# Patient Record
Sex: FEMALE | Race: WHITE | NOT HISPANIC OR LATINO | Employment: OTHER | URBAN - METROPOLITAN AREA
[De-identification: names, ages, dates, MRNs, and addresses within clinical notes are randomized per-mention and may not be internally consistent; named-entity substitution may affect disease eponyms.]

---

## 2020-03-13 RX ORDER — SIMVASTATIN 20 MG
TABLET ORAL
COMMUNITY
End: 2020-03-16

## 2020-03-16 ENCOUNTER — OFFICE VISIT (OUTPATIENT)
Dept: SURGERY | Facility: HOSPITAL | Age: 75
End: 2020-03-16
Payer: COMMERCIAL

## 2020-03-16 VITALS
SYSTOLIC BLOOD PRESSURE: 191 MMHG | WEIGHT: 147.8 LBS | DIASTOLIC BLOOD PRESSURE: 82 MMHG | TEMPERATURE: 98.9 F | HEIGHT: 60 IN | BODY MASS INDEX: 29.02 KG/M2 | RESPIRATION RATE: 16 BRPM | HEART RATE: 51 BPM

## 2020-03-16 DIAGNOSIS — D18.01 HEMANGIOMA OF SKIN: ICD-10-CM

## 2020-03-16 DIAGNOSIS — K40.90 RIGHT INGUINAL HERNIA: Primary | ICD-10-CM

## 2020-03-16 DIAGNOSIS — L98.9 SKIN LESION: ICD-10-CM

## 2020-03-16 DIAGNOSIS — L21.9 SEBORRHEA: ICD-10-CM

## 2020-03-16 DIAGNOSIS — D22.9 MULTIPLE PIGMENTED NEVI: ICD-10-CM

## 2020-03-16 PROCEDURE — 99204 OFFICE O/P NEW MOD 45 MIN: CPT | Performed by: SURGERY

## 2020-03-16 RX ORDER — ZOLPIDEM TARTRATE 10 MG/1
TABLET ORAL AS NEEDED
COMMUNITY
Start: 2020-03-02

## 2020-03-16 RX ORDER — SIMVASTATIN 20 MG
20 TABLET ORAL
COMMUNITY
Start: 2020-03-16

## 2020-03-16 RX ORDER — AMLODIPINE BESYLATE 5 MG/1
5 TABLET ORAL DAILY
COMMUNITY
Start: 2020-03-16

## 2020-03-16 RX ORDER — ATENOLOL AND CHLORTHALIDONE TABLET 100; 25 MG/1; MG/1
1 TABLET ORAL DAILY
COMMUNITY
Start: 2020-03-16

## 2020-03-16 NOTE — PROGRESS NOTES
Assessment/Plan:  Junior Joann Washington is a 69year old female who presents today per referral by ANASTASIA Brooks regarding possible right inguinal hernia  Physical exam revealed right inguinal hernia  Explained the etiology of these hernias  Discussed the risks,benefits and alternatives to laparoscopic repair of right inguinal hernia versus open repair of right inguinal hernia  Explained the surgery as well as pre- and post-procedural protocols and restrictions  Lifting restrictions of no greater than 15 pounds and no strenuous activity for 2 weeks after surgery  No heavy lifting of greater than 25 pounds for weeks 3 and 4  Patient understands the procedure  She would like to proceed with laparoscopic repair  She would not like to schedule the procedure at this time  She is going to call the office when she is ready to schedule the procedure  Patient knows to contact the office if she has any questions or concerns  Skin- There is a small slightly ulcerated erythemas lesion of the left fifth finger  Patient was instructed to cover the area with gauze and dressing  Multiple pigmented nevi, seborrheas, hemangiomas of the back and face  Explained to the patient that these skin lesions are benign at this time  Her PCP should monitor them annually  No problem-specific Assessment & Plan notes found for this encounter  There are no diagnoses linked to this encounter  Subjective:      Patient ID: Robert Ocampo is a 69 y o  female  Junior Joann Washington is a 69year old female who presents today per referral by ANASTASIA Brooks regarding possible right inguinal hernia  Patient states that she first noticed lump of her right groin when she was moving heavy objects back in February  She can feel the lump more when she hs standing up versus laying down  Patient reports that the area feels achy  Her bowel movements are normal  She says that she is up to date with her colonoscopy     Patient will talk to her daughter who lives in [place] and discuss with her if she should have the surgery now or wait for a few months  Patient has surgical history of cholecystectomy  Patient also stated that she has a wound on her left pinkie  The following portions of the patient's history were reviewed and updated as appropriate: allergies, current medications, past family history, past medical history, past social history, past surgical history and problem list     Review of Systems   Constitutional: Negative  HENT: Negative  Eyes: Negative  Respiratory: Negative  Cardiovascular: Negative  Gastrointestinal:        Lump of the right groin    Endocrine: Negative  Genitourinary: Negative  Musculoskeletal: Negative  Skin: Positive for wound (Left fifit finger )  Allergic/Immunologic: Negative  Neurological: Negative  Hematological: Negative  Psychiatric/Behavioral: Negative  Objective: There were no vitals taken for this visit  Physical Exam   Constitutional: She is oriented to person, place, and time  She appears well-developed and well-nourished  No distress  HENT:   Head: Normocephalic and atraumatic  Right Ear: External ear normal    Left Ear: External ear normal    Nose: Nose normal    Mouth/Throat: Oropharynx is clear and moist    Eyes: Pupils are equal, round, and reactive to light  Conjunctivae and EOM are normal    Neck: Normal range of motion  Neck supple  No JVD present  No tracheal deviation present  No thyromegaly present  Cardiovascular: Normal rate, regular rhythm, normal heart sounds and intact distal pulses  Exam reveals no gallop and no friction rub  No murmur heard  Pulmonary/Chest: Effort normal and breath sounds normal  No stridor  No respiratory distress  She has no wheezes  She has no rales  She exhibits no tenderness  Abdominal: Soft  Bowel sounds are normal  She exhibits no distension and no mass  There is no tenderness   There is no rebound and no guarding  A hernia (Right inguinal ) is present  Musculoskeletal: Normal range of motion  She exhibits no edema, tenderness or deformity  Lymphadenopathy:     She has no cervical adenopathy  Neurological: She is alert and oriented to person, place, and time  No cranial nerve deficit  Coordination normal    Skin: Skin is warm and dry  No rash noted  She is not diaphoretic  No erythema  No pallor  Small slightly ulcerated erythemas lesion of the left fifth finger  Psychiatric: She has a normal mood and affect  Her behavior is normal  Judgment and thought content normal    Nursing note and vitals reviewed  By signing my name below, Roberth Santoyo, attest that this documentation has been prepared under the direction and in the presence of Nathalie Menjivar MD  Electronically Signed: Kristen Hawkins  3/16/20  Margie Sharma, personally performed the services described in this documentation  All medical record entries made by the scribe were at my direction and in my presence  I have reviewed the chart and discharge instructions and agree that the record reflects my personal performance and is accurate and complete   Nathalie Menjivar MD  3/16/20

## 2020-03-18 ENCOUNTER — NURSE TRIAGE (OUTPATIENT)
Dept: OTHER | Facility: OTHER | Age: 75
End: 2020-03-18

## 2020-03-18 ENCOUNTER — HOSPITAL ENCOUNTER (EMERGENCY)
Facility: HOSPITAL | Age: 75
Discharge: HOME | End: 2020-03-18
Attending: EMERGENCY MEDICINE
Payer: COMMERCIAL

## 2020-03-18 VITALS
OXYGEN SATURATION: 97 % | RESPIRATION RATE: 16 BRPM | TEMPERATURE: 97.8 F | DIASTOLIC BLOOD PRESSURE: 61 MMHG | HEIGHT: 60 IN | BODY MASS INDEX: 29.06 KG/M2 | HEART RATE: 55 BPM | SYSTOLIC BLOOD PRESSURE: 149 MMHG | WEIGHT: 148 LBS

## 2020-03-18 DIAGNOSIS — N39.0 UTI (URINARY TRACT INFECTION): Primary | ICD-10-CM

## 2020-03-18 LAB
BACTERIA UR QL AUTO: ABNORMAL /HPF
BILIRUB UR QL STRIP: NEGATIVE
CLARITY UR: CLEAR
COLOR UR: YELLOW
GLUCOSE UR STRIP-MCNC: NEGATIVE MG/DL
HGB UR QL STRIP.AUTO: NEGATIVE
KETONES UR STRIP-MCNC: NEGATIVE MG/DL
LEUKOCYTE ESTERASE UR QL STRIP: ABNORMAL
NITRITE UR QL STRIP: NEGATIVE
NON-SQ EPI CELLS URNS QL MICRO: ABNORMAL /HPF
PH UR STRIP.AUTO: 6 [PH]
PROT UR STRIP-MCNC: NEGATIVE MG/DL
RBC #/AREA URNS AUTO: ABNORMAL /HPF
SP GR UR STRIP.AUTO: <=1.005 (ref 1–1.03)
UROBILINOGEN UR QL STRIP.AUTO: 0.2 E.U./DL
WBC #/AREA URNS AUTO: ABNORMAL /HPF

## 2020-03-18 PROCEDURE — 81001 URINALYSIS AUTO W/SCOPE: CPT | Performed by: EMERGENCY MEDICINE

## 2020-03-18 PROCEDURE — 99282 EMERGENCY DEPT VISIT SF MDM: CPT | Performed by: EMERGENCY MEDICINE

## 2020-03-18 PROCEDURE — 99283 EMERGENCY DEPT VISIT LOW MDM: CPT

## 2020-03-18 RX ORDER — NITROFURANTOIN 25; 75 MG/1; MG/1
100 CAPSULE ORAL 2 TIMES DAILY
COMMUNITY
End: 2020-06-15

## 2020-03-18 NOTE — ED PROVIDER NOTES
History  Chief Complaint   Patient presents with   Mario Augustin     pt states she felt shaky at home, started antibiotics for UTI      70 yo F with PMH of HLD, HTN presents with feeling "shaky " was started on abx by PCP for likely UTI (took a home test)  PCP initially ordered bactrim, when she took it she felt like her cheeks and ears were red, so PCP changed to macrobid  She hasn't taken it yet because she lives alone and she was afraid to have another "reaction" so came in for eval  No fevers/chills  No abd pain, just feels like a full bladder  Has urgency and frequency  Nausea, mild, fleeting  Still tolerating PO  No URI sx or travel  No trouble breathing or throat swelling  History provided by:  Patient and medical records   used: No    Female  Problem   Severity:  Mild  Onset quality:  Gradual  Duration:  1 day  Timing:  Constant  Progression:  Unchanged  Chronicity:  New  Associated symptoms: no abdominal pain, no chest pain, no congestion, no cough, no diarrhea, no ear pain, no fatigue, no fever, no headaches, no loss of consciousness, no myalgias, no nausea, no rash, no rhinorrhea, no shortness of breath, no sore throat, no vomiting and no wheezing        Prior to Admission Medications   Prescriptions Last Dose Informant Patient Reported? Taking?    amLODIPine (NORVASC) 5 mg tablet   Yes No   atenolol-chlorthalidone (TENORETIC) 100-25 mg per tablet   Yes No   nitrofurantoin (MACROBID) 100 mg capsule  Self Yes Yes   Sig: Take 100 mg by mouth 2 (two) times a day   simvastatin (ZOCOR) 20 mg tablet   Yes No   zolpidem (AMBIEN) 10 mg tablet   Yes No   Sig: Take 1/2 tablet BY MOUTH ONCE A DAY AT BEDTIME      Facility-Administered Medications: None       Past Medical History:   Diagnosis Date    Allergic rhinitis     DDD (degenerative disc disease), lumbar     Diverticulitis large intestine w/o perforation or abscess w/o bleeding     Hemorrhoids     Hernia of abdominal cavity 2020    History of gallstones     Hyperlipidemia     Hypertension     Lyme disease        Past Surgical History:   Procedure Laterality Date    CHOLECYSTECTOMY      FINGER SURGERY      RIGHT SECOND FINGER-"TRIGGER FINGER"    PILONIDAL CYST EXCISION      SHOULDER SURGERY Right        Family History   Problem Relation Age of Onset    Coronary artery disease Mother     Hypertension Mother     Hypotension Father     Emphysema Father     Prostate cancer Father      I have reviewed and agree with the history as documented  E-Cigarette/Vaping    E-Cigarette Use Never User      E-Cigarette/Vaping Substances     Social History     Tobacco Use    Smoking status: Former Smoker     Last attempt to quit: 1970     Years since quittin 2    Smokeless tobacco: Never Used   Substance Use Topics    Alcohol use: Not Currently    Drug use: Not Currently       Review of Systems   Constitutional: Negative for appetite change, chills, fatigue and fever  HENT: Negative for congestion, ear pain, rhinorrhea, sore throat, trouble swallowing and voice change  Eyes: Negative for pain and visual disturbance  Respiratory: Negative for cough, chest tightness, shortness of breath and wheezing  Cardiovascular: Negative for chest pain, palpitations and leg swelling  Gastrointestinal: Negative for abdominal pain, blood in stool, constipation, diarrhea, nausea and vomiting  Genitourinary: Positive for frequency and urgency  Negative for decreased urine volume, difficulty urinating, dysuria, flank pain, hematuria, vaginal bleeding, vaginal discharge and vaginal pain  Musculoskeletal: Negative for back pain, myalgias, neck pain and neck stiffness  Skin: Negative for rash  Neurological: Negative for dizziness, loss of consciousness, syncope, speech difficulty, light-headedness and headaches  Psychiatric/Behavioral: Negative for confusion and suicidal ideas         Physical Exam  Physical Exam Constitutional: She is oriented to person, place, and time  She appears well-developed and well-nourished  No distress  HENT:   Head: Normocephalic and atraumatic  Right Ear: External ear normal    Left Ear: External ear normal    Nose: Nose normal    Mouth/Throat: Oropharynx is clear and moist    Eyes: Pupils are equal, round, and reactive to light  Conjunctivae and EOM are normal  Right eye exhibits no discharge  Left eye exhibits no discharge  No scleral icterus  Neck: Normal range of motion  Neck supple  No tracheal deviation present  Cardiovascular: Normal rate, regular rhythm, normal heart sounds and intact distal pulses  Exam reveals no gallop and no friction rub  No murmur heard  Pulmonary/Chest: Effort normal and breath sounds normal  No stridor  No respiratory distress  She exhibits no tenderness  Abdominal: Soft  Bowel sounds are normal  There is no tenderness  There is no rebound and no guarding  Musculoskeletal: Normal range of motion  She exhibits no edema or deformity  Lymphadenopathy:     She has no cervical adenopathy  Neurological: She is alert and oriented to person, place, and time  No cranial nerve deficit or sensory deficit  Coordination normal    Skin: Skin is warm and dry  No rash noted  She is not diaphoretic  Psychiatric: She has a normal mood and affect  Her behavior is normal    Nursing note and vitals reviewed        Vital Signs  ED Triage Vitals [03/18/20 0040]   Temperature Pulse Respirations Blood Pressure SpO2   97 8 °F (36 6 °C) 60 18 (!) 176/77 98 %      Temp src Heart Rate Source Patient Position - Orthostatic VS BP Location FiO2 (%)   -- -- -- -- --      Pain Score       4           Vitals:    03/18/20 0040 03/18/20 0130   BP: (!) 176/77 149/61   Pulse: 60 55         Visual Acuity      ED Medications  Medications - No data to display    Diagnostic Studies  Results Reviewed     Procedure Component Value Units Date/Time    Urine Microscopic [[id number]] (Abnormal) Collected:  03/18/20 0142    Lab Status:  Final result Specimen:  Urine, Clean Catch Updated:  03/18/20 0156     RBC, UA None Seen /hpf      WBC, UA 0-1 /hpf      Epithelial Cells Occasional /hpf      Bacteria, UA None Seen /hpf     UA w Reflex to Microscopic w Reflex to Culture [[id number]]  (Abnormal) Collected:  03/18/20 0142    Lab Status:  Final result Specimen:  Urine, Clean Catch Updated:  03/18/20 0149     Color, UA Yellow     Clarity, UA Clear     Specific Gravity, UA <=1 005     pH, UA 6 0     Leukocytes, UA Trace     Nitrite, UA Negative     Protein, UA Negative mg/dl      Glucose, UA Negative mg/dl      Ketones, UA Negative mg/dl      Urobilinogen, UA 0 2 E U /dl      Bilirubin, UA Negative     Blood, UA Negative                 No orders to display              Procedures  Procedures         ED Course  ED Course as of Mar 18 0207   Wed Mar 18, 2020   0152 Pt very well appearing  No sign of allergic rxn  Nontoxic  No CVAT or abd tenderness  Will check urine here  She took 1st dose macrobid here  Will monitor and d/c home  RTED instructions given                                      MDM  Number of Diagnoses or Management Options  UTI (urinary tract infection): new and requires workup     Amount and/or Complexity of Data Reviewed  Clinical lab tests: ordered and reviewed  Review and summarize past medical records: yes    Risk of Complications, Morbidity, and/or Mortality  Presenting problems: low  Diagnostic procedures: minimal  Management options: low    Patient Progress  Patient progress: improved        Disposition  Final diagnoses:   UTI (urinary tract infection)     Time reflects when diagnosis was documented in both MDM as applicable and the Disposition within this note     Time User Action Codes Description Comment    3/18/2020  2:06 AM Marlene Newell Add [N39 0] UTI (urinary tract infection)       ED Disposition     ED Disposition Condition Date/Time Comment    Discharge Stable Wed Mar 18, 2020  1:56 AM Lauren Desai discharge to home/self care  Follow-up Information     Follow up With Specialties Details Why Contact Info Additional Information    JOCELYNE Morris Nurse Practitioner Schedule an appointment as soon as possible for a visit   [address]  ([address])  Bony JEAN [address] [facility] Emergency Medicine  If symptoms worsen [address] [place], [address]  [phone]  ED, [address], [address]          Patient's Medications   Discharge Prescriptions    No medications on file     No discharge procedures on file      PDMP Review     None          ED Provider  Electronically Signed by           Taylor Leung MD  03/18/20 8958

## 2020-03-18 NOTE — TELEPHONE ENCOUNTER
Regarding: UTI Medication Reaction  ----- Message from John Persaud sent at 3/17/2020 11:49 PM EDT -----  " I have a low grade fever of 97 7" I was told I have a UTI  I believe I was having a reaction to medicine because I had red cheeks and red ears (sulsamethoxazole)  I called my PCP and they ordered me a new medicine (nitrofurantoin 100 mg twice a day) but afraid to take it  I want my general surgeon called in reference to taking the new medicine prescribed by my PCP

## 2020-06-15 ENCOUNTER — OFFICE VISIT (OUTPATIENT)
Dept: SURGERY | Facility: HOSPITAL | Age: 75
End: 2020-06-15
Payer: COMMERCIAL

## 2020-06-15 VITALS
SYSTOLIC BLOOD PRESSURE: 171 MMHG | TEMPERATURE: 97.9 F | BODY MASS INDEX: 30.12 KG/M2 | DIASTOLIC BLOOD PRESSURE: 78 MMHG | WEIGHT: 153.4 LBS | HEART RATE: 49 BPM | HEIGHT: 60 IN

## 2020-06-15 DIAGNOSIS — K40.90 NON-RECURRENT UNILATERAL INGUINAL HERNIA WITHOUT OBSTRUCTION OR GANGRENE: Primary | ICD-10-CM

## 2020-06-15 PROCEDURE — 99213 OFFICE O/P EST LOW 20 MIN: CPT | Performed by: SURGERY

## 2020-06-15 RX ORDER — PREDNISONE 10 MG/1
TABLET ORAL EVERY 24 HOURS
COMMUNITY
Start: 2020-06-11 | End: 2020-07-09 | Stop reason: ALTCHOICE

## 2020-06-15 RX ORDER — ZOLPIDEM TARTRATE 10 MG/1
TABLET ORAL
COMMUNITY
End: 2020-07-09 | Stop reason: SDUPTHER

## 2020-06-15 RX ORDER — GABAPENTIN 300 MG/1
CAPSULE ORAL 2 TIMES DAILY
COMMUNITY

## 2020-06-15 RX ORDER — AMOXICILLIN AND CLAVULANATE POTASSIUM 875; 125 MG/1; MG/1
TABLET, FILM COATED ORAL EVERY 12 HOURS
COMMUNITY
End: 2020-07-09 | Stop reason: ALTCHOICE

## 2020-06-16 ENCOUNTER — APPOINTMENT (OUTPATIENT)
Dept: LAB | Facility: HOSPITAL | Age: 75
End: 2020-06-16
Attending: SURGERY
Payer: COMMERCIAL

## 2020-06-16 ENCOUNTER — HOSPITAL ENCOUNTER (OUTPATIENT)
Dept: RADIOLOGY | Facility: HOSPITAL | Age: 75
Discharge: HOME | End: 2020-06-16
Attending: SURGERY
Payer: COMMERCIAL

## 2020-06-16 ENCOUNTER — OFFICE VISIT (OUTPATIENT)
Dept: LAB | Facility: HOSPITAL | Age: 75
End: 2020-06-16
Attending: SURGERY
Payer: COMMERCIAL

## 2020-06-16 DIAGNOSIS — K40.90 NON-RECURRENT UNILATERAL INGUINAL HERNIA WITHOUT OBSTRUCTION OR GANGRENE: ICD-10-CM

## 2020-06-16 LAB
ANION GAP SERPL CALCULATED.3IONS-SCNC: 5 MMOL/L (ref 4–13)
ATRIAL RATE: 44 BPM
BASOPHILS # BLD AUTO: 0.03 THOUSANDS/ΜL (ref 0–0.1)
BASOPHILS NFR BLD AUTO: 0 % (ref 0–1)
BILIRUB UR QL STRIP: NEGATIVE
BUN SERPL-MCNC: 20 MG/DL (ref 5–25)
CALCIUM SERPL-MCNC: 9.6 MG/DL (ref 8.3–10.1)
CHLORIDE SERPL-SCNC: 97 MMOL/L (ref 100–108)
CLARITY UR: CLEAR
CO2 SERPL-SCNC: 32 MMOL/L (ref 21–32)
COLOR UR: YELLOW
CREAT SERPL-MCNC: 0.85 MG/DL (ref 0.6–1.3)
EOSINOPHIL # BLD AUTO: 0.13 THOUSAND/ΜL (ref 0–0.61)
EOSINOPHIL NFR BLD AUTO: 1 % (ref 0–6)
ERYTHROCYTE [DISTWIDTH] IN BLOOD BY AUTOMATED COUNT: 12.8 % (ref 11.6–15.1)
EST. AVERAGE GLUCOSE BLD GHB EST-MCNC: 120 MG/DL
GFR SERPL CREATININE-BSD FRML MDRD: 69 ML/MIN/1.73SQ M
GLUCOSE P FAST SERPL-MCNC: 97 MG/DL (ref 65–99)
GLUCOSE UR STRIP-MCNC: NEGATIVE MG/DL
HBA1C MFR BLD: 5.8 %
HCT VFR BLD AUTO: 42.2 % (ref 34.8–46.1)
HGB BLD-MCNC: 14 G/DL (ref 11.5–15.4)
HGB UR QL STRIP.AUTO: NEGATIVE
IMM GRANULOCYTES # BLD AUTO: 0.05 THOUSAND/UL (ref 0–0.2)
IMM GRANULOCYTES NFR BLD AUTO: 0 % (ref 0–2)
KETONES UR STRIP-MCNC: NEGATIVE MG/DL
LEUKOCYTE ESTERASE UR QL STRIP: NEGATIVE
LYMPHOCYTES # BLD AUTO: 3.17 THOUSANDS/ΜL (ref 0.6–4.47)
LYMPHOCYTES NFR BLD AUTO: 27 % (ref 14–44)
MCH RBC QN AUTO: 29.9 PG (ref 26.8–34.3)
MCHC RBC AUTO-ENTMCNC: 33.2 G/DL (ref 31.4–37.4)
MCV RBC AUTO: 90 FL (ref 82–98)
MONOCYTES # BLD AUTO: 0.81 THOUSAND/ΜL (ref 0.17–1.22)
MONOCYTES NFR BLD AUTO: 7 % (ref 4–12)
NEUTROPHILS # BLD AUTO: 7.41 THOUSANDS/ΜL (ref 1.85–7.62)
NEUTS SEG NFR BLD AUTO: 65 % (ref 43–75)
NITRITE UR QL STRIP: NEGATIVE
NRBC BLD AUTO-RTO: 0 /100 WBCS
P AXIS: 40 DEGREES
PH UR STRIP.AUTO: 8 [PH]
PLATELET # BLD AUTO: 278 THOUSANDS/UL (ref 149–390)
PMV BLD AUTO: 10.5 FL (ref 8.9–12.7)
POTASSIUM SERPL-SCNC: 3.6 MMOL/L (ref 3.5–5.3)
PR INTERVAL: 152 MS
PROT UR STRIP-MCNC: NEGATIVE MG/DL
QRS AXIS: 38 DEGREES
QRSD INTERVAL: 78 MS
QT INTERVAL: 508 MS
QTC INTERVAL: 434 MS
RBC # BLD AUTO: 4.69 MILLION/UL (ref 3.81–5.12)
SODIUM SERPL-SCNC: 134 MMOL/L (ref 136–145)
SP GR UR STRIP.AUTO: 1.01 (ref 1–1.03)
T WAVE AXIS: 45 DEGREES
UROBILINOGEN UR QL STRIP.AUTO: 0.2 E.U./DL
VENTRICULAR RATE: 44 BPM
WBC # BLD AUTO: 11.6 THOUSAND/UL (ref 4.31–10.16)

## 2020-06-16 PROCEDURE — 83036 HEMOGLOBIN GLYCOSYLATED A1C: CPT

## 2020-06-16 PROCEDURE — 36415 COLL VENOUS BLD VENIPUNCTURE: CPT

## 2020-06-16 PROCEDURE — 71046 X-RAY EXAM CHEST 2 VIEWS: CPT

## 2020-06-16 PROCEDURE — 85025 COMPLETE CBC W/AUTO DIFF WBC: CPT

## 2020-06-16 PROCEDURE — 93005 ELECTROCARDIOGRAM TRACING: CPT

## 2020-06-16 PROCEDURE — 80048 BASIC METABOLIC PNL TOTAL CA: CPT

## 2020-06-16 PROCEDURE — 81003 URINALYSIS AUTO W/O SCOPE: CPT | Performed by: SURGERY

## 2020-06-16 PROCEDURE — 93010 ELECTROCARDIOGRAM REPORT: CPT | Performed by: INTERNAL MEDICINE

## 2020-06-18 DIAGNOSIS — K40.90 NON-RECURRENT UNILATERAL INGUINAL HERNIA WITHOUT OBSTRUCTION OR GANGRENE: Primary | ICD-10-CM

## 2020-07-07 DIAGNOSIS — K40.90 NON-RECURRENT UNILATERAL INGUINAL HERNIA WITHOUT OBSTRUCTION OR GANGRENE: ICD-10-CM

## 2020-07-07 PROCEDURE — U0003 INFECTIOUS AGENT DETECTION BY NUCLEIC ACID (DNA OR RNA); SEVERE ACUTE RESPIRATORY SYNDROME CORONAVIRUS 2 (SARS-COV-2) (CORONAVIRUS DISEASE [COVID-19]), AMPLIFIED PROBE TECHNIQUE, MAKING USE OF HIGH THROUGHPUT TECHNOLOGIES AS DESCRIBED BY CMS-2020-01-R: HCPCS

## 2020-07-09 LAB
INPATIENT: NORMAL
SARS-COV-2 RNA SPEC QL NAA+PROBE: NOT DETECTED

## 2020-07-09 RX ORDER — OMEPRAZOLE 20 MG/1
20 TABLET, DELAYED RELEASE ORAL DAILY PRN
COMMUNITY

## 2020-07-09 RX ORDER — ACETAMINOPHEN 500 MG
500 TABLET ORAL EVERY 6 HOURS PRN
COMMUNITY

## 2020-07-09 NOTE — PRE-PROCEDURE INSTRUCTIONS
Pre-Surgery Instructions:   Medication Instructions    acetaminophen (TYLENOL) 500 mg tablet Instructed patient per Anesthesia Guidelines   amLODIPine (NORVASC) 5 mg tablet Instructed patient per Anesthesia Guidelines   atenolol-chlorthalidone (TENORETIC) 100-25 mg per tablet Instructed patient per Anesthesia Guidelines   gabapentin (NEURONTIN) 300 mg capsule Instructed patient per Anesthesia Guidelines   omeprazole (PriLOSEC OTC) 20 MG tablet Instructed patient per Anesthesia Guidelines   simvastatin (ZOCOR) 20 mg tablet Instructed patient per Anesthesia Guidelines   zolpidem (AMBIEN) 10 mg tablet Instructed patient per Anesthesia Guidelines  Before your operation, you play an important role in decreasing your risk for infection by washing with special antiseptic soap  This is an effective way to reduce bacteria on the skin which may help to prevent infections at the surgical site  Please read the following directions in advance  1  In the week before your operation purchase a 4 ounce bottle of antiseptic soap containing chlorhexidine gluconate 4%  Some brand names include: Aplicare, Endure, and Hibiclens  The cost is usually less than $5 00  · For your convenience, the [employer] carries the soap  · It may also be available at your doctor's office or pre-admission testing center, and at most retail pharmacies  · If you are allergic or sensitive to soaps containing chlorhexidine gluconate (CHG), please let your doctor know so another antiseptic soap can be suggested  · CHG antiseptic soap is for external use only  2      The day before your operation follow these directions carefully to get ready  · Place clean lines (sheets) on your bed; you should sleep on clean sheets after your evening shower  · Get clean towels and washcloths ready - you need enough for 2 showers  · Set aside clean underwear, pajamas, and clothing to wear after the shower      Reminders:  · DO NOT use any other soap or body rinse on your skin during or after the antiseptic showers  · DO NOT use lotion , powder, deodorant, or perfume/aftershave of any kind on your skin after your antiseptic shower  · DO NOT shave any body parts in the 24 hours/the day before your operation  · DO NOT get the antiseptic soap in your eyes, ears, nose, mouth, or vaginal area  3      You will need to shower the night before AND the morning of your Surgery  Shower 1:  · The evening before your operation, take the fist shower  · First, shampoo your hair with regular shampoo and rinse it completely before you use the anitseptic soap  After washing and rinsing your hair, rinse your body  · Next, use a clean wash cloth to apply the antiseptic soap and wash your body from the neck down to your toes using 1/2 bottle of the antiseptic soap  You will use the other 1/2 bottle for the second shower  · Clean the area where your incision will be; later this area well for about 2 minutes  · If you ar having head or neck surgery, wash areas with the antiseptic soap  · Rinse yourself completely with running water  · Use a clean towel to dry off  · Wear clean underwear and clothing/pajamas  Shower 2:  · The Morning of your operation, take the second shower following the same steps as Shower 1 using the second 1/2 of the bottle of antiseptic soap  · Use clean cloths and towels to was and dry yourself off  · Wear clean underwear and clothing  You will receive a phone call from hospital for arrival time  Please call surgeons office if any changes in your condition  Wear easy on/off clothing; consider type of surgery;  valuables and jewelry please keep at home  **COVID-19  education done  Please: No contacts or eye make up or artificial eyelashes    Please secure transportation     Follow pre surgery showering or cleaning instructions as  Reviewed by nurse or surgeons office      Questions answered and concerns addressed

## 2020-07-14 ENCOUNTER — HOSPITAL ENCOUNTER (OUTPATIENT)
Facility: HOSPITAL | Age: 75
Setting detail: OUTPATIENT SURGERY
Discharge: HOME | End: 2020-07-14
Attending: SURGERY | Admitting: SURGERY
Payer: COMMERCIAL

## 2020-07-14 ENCOUNTER — ANESTHESIA (OUTPATIENT)
Dept: PERIOP | Facility: HOSPITAL | Age: 75
End: 2020-07-14
Payer: COMMERCIAL

## 2020-07-14 ENCOUNTER — ANESTHESIA EVENT (OUTPATIENT)
Dept: PERIOP | Facility: HOSPITAL | Age: 75
End: 2020-07-14
Payer: COMMERCIAL

## 2020-07-14 VITALS
OXYGEN SATURATION: 98 % | HEIGHT: 60 IN | RESPIRATION RATE: 16 BRPM | SYSTOLIC BLOOD PRESSURE: 145 MMHG | WEIGHT: 148 LBS | TEMPERATURE: 97.5 F | BODY MASS INDEX: 29.06 KG/M2 | HEART RATE: 60 BPM | DIASTOLIC BLOOD PRESSURE: 62 MMHG

## 2020-07-14 DIAGNOSIS — Z87.19 S/P HERNIA REPAIR: Primary | ICD-10-CM

## 2020-07-14 DIAGNOSIS — K40.90 NON-RECURRENT UNILATERAL INGUINAL HERNIA WITHOUT OBSTRUCTION OR GANGRENE: ICD-10-CM

## 2020-07-14 DIAGNOSIS — Z98.890 S/P HERNIA REPAIR: Primary | ICD-10-CM

## 2020-07-14 PROCEDURE — 49659 UNLSTD LAPS PX HRNAP HRNRPHY: CPT | Performed by: SURGERY

## 2020-07-14 PROCEDURE — 51798 US URINE CAPACITY MEASURE: CPT | Performed by: SURGERY

## 2020-07-14 PROCEDURE — C1781 MESH (IMPLANTABLE): HCPCS | Performed by: SURGERY

## 2020-07-14 PROCEDURE — 49650 LAP ING HERNIA REPAIR INIT: CPT | Performed by: SURGERY

## 2020-07-14 DEVICE — LAPAROSCOPIC SELF-FIXATING MESH, RIGHT ANATOMICAL
Type: IMPLANTABLE DEVICE | Site: INGUINAL | Status: FUNCTIONAL
Brand: PROGRIP

## 2020-07-14 RX ORDER — ROCURONIUM BROMIDE 10 MG/ML
INJECTION, SOLUTION INTRAVENOUS AS NEEDED
Status: DISCONTINUED | OUTPATIENT
Start: 2020-07-14 | End: 2020-07-14 | Stop reason: SURG

## 2020-07-14 RX ORDER — ONDANSETRON 2 MG/ML
INJECTION INTRAMUSCULAR; INTRAVENOUS AS NEEDED
Status: DISCONTINUED | OUTPATIENT
Start: 2020-07-14 | End: 2020-07-14 | Stop reason: SURG

## 2020-07-14 RX ORDER — HYDROCODONE BITARTRATE AND ACETAMINOPHEN 5; 325 MG/1; MG/1
1-2 TABLET ORAL EVERY 6 HOURS PRN
Qty: 24 TABLET | Refills: 0 | Status: SHIPPED | OUTPATIENT
Start: 2020-07-14 | End: 2020-07-17

## 2020-07-14 RX ORDER — HYDROCODONE BITARTRATE AND ACETAMINOPHEN 5; 325 MG/1; MG/1
2 TABLET ORAL EVERY 4 HOURS PRN
Status: DISCONTINUED | OUTPATIENT
Start: 2020-07-14 | End: 2020-07-14 | Stop reason: HOSPADM

## 2020-07-14 RX ORDER — ONDANSETRON 2 MG/ML
4 INJECTION INTRAMUSCULAR; INTRAVENOUS EVERY 6 HOURS PRN
Status: DISCONTINUED | OUTPATIENT
Start: 2020-07-14 | End: 2020-07-14 | Stop reason: HOSPADM

## 2020-07-14 RX ORDER — HYDRALAZINE HYDROCHLORIDE 20 MG/ML
5 INJECTION INTRAMUSCULAR; INTRAVENOUS ONCE AS NEEDED
Status: DISCONTINUED | OUTPATIENT
Start: 2020-07-14 | End: 2020-07-14 | Stop reason: HOSPADM

## 2020-07-14 RX ORDER — KETOROLAC TROMETHAMINE 30 MG/ML
INJECTION, SOLUTION INTRAMUSCULAR; INTRAVENOUS AS NEEDED
Status: DISCONTINUED | OUTPATIENT
Start: 2020-07-14 | End: 2020-07-14 | Stop reason: SURG

## 2020-07-14 RX ORDER — CEFAZOLIN SODIUM 2 G/50ML
2000 SOLUTION INTRAVENOUS ONCE
Status: COMPLETED | OUTPATIENT
Start: 2020-07-14 | End: 2020-07-14

## 2020-07-14 RX ORDER — SODIUM CHLORIDE, SODIUM LACTATE, POTASSIUM CHLORIDE, CALCIUM CHLORIDE 600; 310; 30; 20 MG/100ML; MG/100ML; MG/100ML; MG/100ML
125 INJECTION, SOLUTION INTRAVENOUS CONTINUOUS
Status: CANCELLED | OUTPATIENT
Start: 2020-07-14

## 2020-07-14 RX ORDER — PROPOFOL 10 MG/ML
INJECTION, EMULSION INTRAVENOUS AS NEEDED
Status: DISCONTINUED | OUTPATIENT
Start: 2020-07-14 | End: 2020-07-14 | Stop reason: SURG

## 2020-07-14 RX ORDER — ATROPINE SULFATE 0.4 MG/ML
AMPUL (ML) INJECTION AS NEEDED
Status: DISCONTINUED | OUTPATIENT
Start: 2020-07-14 | End: 2020-07-14 | Stop reason: SURG

## 2020-07-14 RX ORDER — HYDROMORPHONE HCL/PF 1 MG/ML
0.25 SYRINGE (ML) INJECTION
Status: DISCONTINUED | OUTPATIENT
Start: 2020-07-14 | End: 2020-07-14 | Stop reason: HOSPADM

## 2020-07-14 RX ORDER — FENTANYL CITRATE 50 UG/ML
INJECTION, SOLUTION INTRAMUSCULAR; INTRAVENOUS AS NEEDED
Status: DISCONTINUED | OUTPATIENT
Start: 2020-07-14 | End: 2020-07-14 | Stop reason: SURG

## 2020-07-14 RX ORDER — MIDAZOLAM HYDROCHLORIDE 2 MG/2ML
INJECTION, SOLUTION INTRAMUSCULAR; INTRAVENOUS AS NEEDED
Status: DISCONTINUED | OUTPATIENT
Start: 2020-07-14 | End: 2020-07-14 | Stop reason: SURG

## 2020-07-14 RX ORDER — FENTANYL CITRATE/PF 50 MCG/ML
25 SYRINGE (ML) INJECTION
Status: DISCONTINUED | OUTPATIENT
Start: 2020-07-14 | End: 2020-07-14 | Stop reason: HOSPADM

## 2020-07-14 RX ORDER — IBUPROFEN 600 MG/1
600 TABLET ORAL EVERY 6 HOURS PRN
Qty: 15 TABLET | Refills: 0 | Status: CANCELLED | OUTPATIENT
Start: 2020-07-14

## 2020-07-14 RX ORDER — ACETAMINOPHEN 325 MG/1
650 TABLET ORAL EVERY 6 HOURS PRN
Status: DISCONTINUED | OUTPATIENT
Start: 2020-07-14 | End: 2020-07-14 | Stop reason: HOSPADM

## 2020-07-14 RX ORDER — MAGNESIUM HYDROXIDE 1200 MG/15ML
LIQUID ORAL AS NEEDED
Status: DISCONTINUED | OUTPATIENT
Start: 2020-07-14 | End: 2020-07-14 | Stop reason: HOSPADM

## 2020-07-14 RX ORDER — SODIUM CHLORIDE, SODIUM LACTATE, POTASSIUM CHLORIDE, CALCIUM CHLORIDE 600; 310; 30; 20 MG/100ML; MG/100ML; MG/100ML; MG/100ML
125 INJECTION, SOLUTION INTRAVENOUS CONTINUOUS
Status: DISCONTINUED | OUTPATIENT
Start: 2020-07-14 | End: 2020-07-14 | Stop reason: HOSPADM

## 2020-07-14 RX ORDER — GLYCOPYRROLATE 0.2 MG/ML
INJECTION INTRAMUSCULAR; INTRAVENOUS AS NEEDED
Status: DISCONTINUED | OUTPATIENT
Start: 2020-07-14 | End: 2020-07-14 | Stop reason: SURG

## 2020-07-14 RX ORDER — NEOSTIGMINE METHYLSULFATE 1 MG/ML
INJECTION INTRAVENOUS AS NEEDED
Status: DISCONTINUED | OUTPATIENT
Start: 2020-07-14 | End: 2020-07-14 | Stop reason: SURG

## 2020-07-14 RX ORDER — CEFAZOLIN SODIUM 2 G/50ML
2000 SOLUTION INTRAVENOUS ONCE
Status: CANCELLED | OUTPATIENT
Start: 2020-07-14 | End: 2020-07-14

## 2020-07-14 RX ADMIN — SODIUM CHLORIDE, SODIUM LACTATE, POTASSIUM CHLORIDE, AND CALCIUM CHLORIDE 125 ML/HR: .6; .31; .03; .02 INJECTION, SOLUTION INTRAVENOUS at 15:30

## 2020-07-14 RX ADMIN — ROCURONIUM BROMIDE 40 MG: 10 INJECTION, SOLUTION INTRAVENOUS at 09:57

## 2020-07-14 RX ADMIN — KETOROLAC TROMETHAMINE 15 MG: 30 INJECTION, SOLUTION INTRAMUSCULAR; INTRAVENOUS at 10:48

## 2020-07-14 RX ADMIN — HYDROCODONE BITARTRATE AND ACETAMINOPHEN 2 TABLET: 5; 325 TABLET ORAL at 11:54

## 2020-07-14 RX ADMIN — GLYCOPYRROLATE 0.6 MG: 0.2 INJECTION, SOLUTION INTRAMUSCULAR; INTRAVENOUS at 10:57

## 2020-07-14 RX ADMIN — SODIUM CHLORIDE, SODIUM LACTATE, POTASSIUM CHLORIDE, AND CALCIUM CHLORIDE: .6; .31; .03; .02 INJECTION, SOLUTION INTRAVENOUS at 08:40

## 2020-07-14 RX ADMIN — ONDANSETRON 4 MG: 2 INJECTION INTRAMUSCULAR; INTRAVENOUS at 10:45

## 2020-07-14 RX ADMIN — HYDRALAZINE HYDROCHLORIDE 5 MG: 20 INJECTION INTRAMUSCULAR; INTRAVENOUS at 11:38

## 2020-07-14 RX ADMIN — NEOSTIGMINE METHYLSULFATE 3 MG: 1 INJECTION, SOLUTION INTRAVENOUS at 10:57

## 2020-07-14 RX ADMIN — MIDAZOLAM 2 MG: 1 INJECTION INTRAMUSCULAR; INTRAVENOUS at 09:53

## 2020-07-14 RX ADMIN — SODIUM CHLORIDE, SODIUM LACTATE, POTASSIUM CHLORIDE, AND CALCIUM CHLORIDE 1000 ML: .6; .31; .03; .02 INJECTION, SOLUTION INTRAVENOUS at 17:24

## 2020-07-14 RX ADMIN — FENTANYL CITRATE 100 MCG: 50 INJECTION, SOLUTION INTRAMUSCULAR; INTRAVENOUS at 09:57

## 2020-07-14 RX ADMIN — Medication 0.4 MG: at 10:32

## 2020-07-14 RX ADMIN — HYDROCODONE BITARTRATE AND ACETAMINOPHEN 1 TABLET: 5; 325 TABLET ORAL at 18:25

## 2020-07-14 RX ADMIN — CEFAZOLIN SODIUM 2000 MG: 2 SOLUTION INTRAVENOUS at 10:04

## 2020-07-14 RX ADMIN — PROPOFOL 150 MG: 10 INJECTION, EMULSION INTRAVENOUS at 09:56

## 2020-07-14 NOTE — ANESTHESIA POSTPROCEDURE EVALUATION
Post-Op Assessment Note    CV Status:  Stable  Pain Score: 0    Pain management: adequate     Mental Status:  Alert and awake   Hydration Status:  Euvolemic   PONV Controlled:  Controlled   Airway Patency:  Patent   Post Op Vitals Reviewed: Yes      Staff: CRNA           BP     Temp      Pulse    Resp      SpO2

## 2020-07-14 NOTE — H&P (VIEW-ONLY)
Assessment/Plan:   Rogelio Yates is a 69 y  o female who is here for Inguinal Hernia ([facility]  Established patient who returns for re-evaluation and discuss surgery)    Plan: [facility] - discussed operative approaches, risks, and benefits and patient has decided to proceed with laparoscopic right inguinal hernia and if I find hernia on the left she would like that repair  I will schedule her at her earliest convinence     Preoperative Clearance: None    HPI:  Rogelio Yates is a 69 y  o female who was referred for evaluation of Inguinal Hernia ([facility]  Established patient who returns for re-evaluation and discuss surgery)    Currently having more symptoms of discomfort in right groin with gurgling bowel sound      ROS:  General ROS: negative  negative for - chills, fatigue, fever or night sweats, weight loss  Respiratory ROS: no cough, shortness of breath, or wheezing  Cardiovascular ROS: no chest pain or dyspnea on exertion  Genito-Urinary ROS: no dysuria, trouble voiding, or hematuria  Musculoskeletal ROS: negative for - gait disturbance, joint pain or muscle pain  Neurological ROS: no TIA or stroke symptoms  GI: right groin pain    [unfilled]  Sulfa antibiotics  No current outpatient medications on file    Past Medical History:   Diagnosis Date    Allergic rhinitis     DDD (degenerative disc disease), lumbar     Diverticulitis large intestine w/o perforation or abscess w/o bleeding     Hemorrhoids     Hernia of abdominal cavity 03/18/2020    History of gallstones     Hyperlipidemia     Hypertension     Lyme disease 1988     Past Surgical History:   Procedure Laterality Date    BAND HEMORRHOIDECTOMY  07/08/2020    CHOLECYSTECTOMY      COLONOSCOPY      FINGER SURGERY      RIGHT SECOND FINGER-"TRIGGER FINGER"    PILONIDAL CYST EXCISION      SHOULDER SURGERY Right      Family History   Problem Relation Age of Onset    Coronary artery disease Mother     Hypertension Mother     Hypotension Father    Todd Schneider Emphysema Father     Prostate cancer Father       reports that she quit smoking about 50 years ago  She has a 2 50 pack-year smoking history  She has never used smokeless tobacco  She reports that she drank alcohol  She reports that she has current or past drug history  Labs:   Lab Results   Component Value Date    WBC 11 60 (H) 06/16/2020    HGB 14 0 06/16/2020     06/16/2020     No results found for: ALT, AST  This SmartLink has not been configured with any valid records  PHYSICAL EXAM  General Appearance:    Alert, cooperative, no distress   Head:    Normocephalic without obvious abnormality   Eyes:    PERRL, conjunctiva/corneas clear, EOM's intact        Neck:   Supple, no adenopathy, no JVD   Back:     Symmetric, no spinal or CVA tenderness   Lungs:     Clear to auscultation bilaterally, no wheezing or rhonchi   Heart:    Regular rate and rhythm, S1 and S2 normal, no murmur   Abdomen:     Soft +BS ND TTP in right groin reducible right inguinal hernia   Extremities:   Extremities normal  No clubbing, cyanosis or edema   Psych:   Normal Affect, AOx3  Neurologic:  Skin:   CNII-XII intact  Strength symmetric, speech intact    Warm, dry, intact, no visible rashes or lesions         Physical Exam              Some portions of this record may have been generated with voice recognition software  There may be translation, syntax,  or grammatical errors  Occasional wrong word or "sound-a-like" substitutions may have occurred due to the inherent limitations of the voice recognition software  Read the chart carefully and recognize, using context, where substitutions may have occurred  If you have any questions, please contact the dictating provider for clarification or correction, as needed  This encounter has been coded by a non-certified coder

## 2020-07-14 NOTE — ANESTHESIA PREPROCEDURE EVALUATION
Review of Systems/Medical History  Patient summary reviewed  Chart reviewed  No history of anesthetic complications     Cardiovascular  EKG reviewed, Exercise tolerance (METS): >4,  Hyperlipidemia, Hypertension controlled,    Pulmonary  Negative pulmonary ROS        GI/Hepatic    GERD well controlled,        Negative  ROS        Endo/Other  Negative endo/other ROS      GYN  Negative gynecology ROS          Hematology  Negative hematology ROS      Musculoskeletal  Back pain , lumbar pain, Osteoarthritis,   Arthritis     Neurology  Negative neurology ROS      Psychology     Chronic pain,            Physical Exam    Airway    Mallampati score: II  TM Distance: >3 FB  Neck ROM: full     Dental   Comment: Multiple implants/caps/crowns, implants,     Cardiovascular  Rhythm: regular, Rate: normal, Cardiovascular exam normal    Pulmonary  Pulmonary exam normal Breath sounds clear to auscultation,     Other Findings        Anesthesia Plan  ASA Score- 2     Anesthesia Type- general with ASA Monitors  Additional Monitors:   Airway Plan: ETT  Plan Factors-    Induction- intravenous  Postoperative Plan- Plan for postoperative opioid use  Informed Consent- Anesthetic plan and risks discussed with patient  I personally reviewed this patient with the CRNA  Discussed and agreed on the Anesthesia Plan with the CRNA  Sierra Shoemaker

## 2020-07-14 NOTE — PERIOPERATIVE NURSING NOTE
Sierra Mario PA-C notified regarding bladder scan for 185  To follow urinary retention protocol  Encouraged ambulation

## 2020-07-14 NOTE — PROGRESS NOTES
Assessment/Plan:   Brian Velez is a 69 y  o female who is here for Inguinal Hernia ([facility]  Established patient who returns for re-evaluation and discuss surgery)    Plan: [facility] - discussed operative approaches, risks, and benefits and patient has decided to proceed with laparoscopic right inguinal hernia and if I find hernia on the left she would like that repair  I will schedule her at her earliest convinence     Preoperative Clearance: None    HPI:  Brian Velez is a 69 y  o female who was referred for evaluation of Inguinal Hernia ([facility]  Established patient who returns for re-evaluation and discuss surgery)    Currently having more symptoms of discomfort in right groin with gurgling bowel sound      ROS:  General ROS: negative  negative for - chills, fatigue, fever or night sweats, weight loss  Respiratory ROS: no cough, shortness of breath, or wheezing  Cardiovascular ROS: no chest pain or dyspnea on exertion  Genito-Urinary ROS: no dysuria, trouble voiding, or hematuria  Musculoskeletal ROS: negative for - gait disturbance, joint pain or muscle pain  Neurological ROS: no TIA or stroke symptoms  GI: right groin pain    [unfilled]  Sulfa antibiotics  No current outpatient medications on file    Past Medical History:   Diagnosis Date    Allergic rhinitis     DDD (degenerative disc disease), lumbar     Diverticulitis large intestine w/o perforation or abscess w/o bleeding     Hemorrhoids     Hernia of abdominal cavity 03/18/2020    History of gallstones     Hyperlipidemia     Hypertension     Lyme disease 1988     Past Surgical History:   Procedure Laterality Date    BAND HEMORRHOIDECTOMY  07/08/2020    CHOLECYSTECTOMY      COLONOSCOPY      FINGER SURGERY      RIGHT SECOND FINGER-"TRIGGER FINGER"    PILONIDAL CYST EXCISION      SHOULDER SURGERY Right      Family History   Problem Relation Age of Onset    Coronary artery disease Mother     Hypertension Mother     Hypotension Father    Aretha Shelby Emphysema Father     Prostate cancer Father       reports that she quit smoking about 50 years ago  She has a 2 50 pack-year smoking history  She has never used smokeless tobacco  She reports that she drank alcohol  She reports that she has current or past drug history  Labs:   Lab Results   Component Value Date    WBC 11 60 (H) 06/16/2020    HGB 14 0 06/16/2020     06/16/2020     No results found for: ALT, AST  This SmartLink has not been configured with any valid records  PHYSICAL EXAM  General Appearance:    Alert, cooperative, no distress   Head:    Normocephalic without obvious abnormality   Eyes:    PERRL, conjunctiva/corneas clear, EOM's intact        Neck:   Supple, no adenopathy, no JVD   Back:     Symmetric, no spinal or CVA tenderness   Lungs:     Clear to auscultation bilaterally, no wheezing or rhonchi   Heart:    Regular rate and rhythm, S1 and S2 normal, no murmur   Abdomen:     Soft +BS ND TTP in right groin reducible right inguinal hernia   Extremities:   Extremities normal  No clubbing, cyanosis or edema   Psych:   Normal Affect, AOx3  Neurologic:  Skin:   CNII-XII intact  Strength symmetric, speech intact    Warm, dry, intact, no visible rashes or lesions         Physical Exam              Some portions of this record may have been generated with voice recognition software  There may be translation, syntax,  or grammatical errors  Occasional wrong word or "sound-a-like" substitutions may have occurred due to the inherent limitations of the voice recognition software  Read the chart carefully and recognize, using context, where substitutions may have occurred  If you have any questions, please contact the dictating provider for clarification or correction, as needed  This encounter has been coded by a non-certified coder

## 2020-07-14 NOTE — PERIOPERATIVE NURSING NOTE
Pt continues to be unable to void  Sierra rice notified via tiger text that bladder scan result was 260ml  Pt unsymptomatic  Sierra stated that as long at the patient understood if she went home and did not void in the next 4hrs, she would have to return to the ER  This was explained to the patient and patients daughter  Both stated understanding with teach back method  Patient discharged

## 2020-07-14 NOTE — PERIOPERATIVE NURSING NOTE
Patient attempted to void x3 without success  Bladder scanned for 106 ml  Maintain hydration by drinking small amounts of clear fluids frequently, then soft diet, and then advance diet as tolerated  May use OTC Imodium if desired for any diarrhea  Call if symptoms worsen, high fever, severe weakness or fainting, increased abdominal pain, blood in stool or vomit, or failure to improve in 2-3 days   Infusing and patient is drinking water

## 2020-07-14 NOTE — DISCHARGE INSTRUCTIONS
[facility] Instructions                  Dr Helen ANDERSON     1  General: Cleopatra Quezada will feel pulling sensations around the wound or funny aches and pains around the incisions  This is normal  Even minor surgery is a change in your body and this is your bodys way of reaction to it  If you have had abdominal surgery, it may help to support the incision with a small pillow or blanket for comfort when moving or coughing  2  Wound care:       Glue - Leave glue alone, it will fall off on its own, no need for an additional dressings    3  Water: You may shower over the wounds  Do not bathe or use a pool or hot tub until cleared by the physician  You may shower right over the incisions and rinse wound with soapy water but do not scrub incisions  Pat dry when you are done  4  Activity: You may go up and down stairs, walk as much as you are comfortable, but walk at least 3 times each day  If you have had abdominal surgery, do not lift anything heavier than 15 pounds for at least 2 weeks, unless cleared by the doctor  5  Diet: You may resume a regular diet  If you had a same-day surgery or overnight stay surgery, you may wish to eat lightly for a few days: soups, crackers, and sandwiches  You may resume a regular diet when ready  6  Medications: Resume all of your previous medications, unless told otherwise by the doctor  Avoid aspirin products for 2-3 days after the date of surgery  You may, at that time, began to take them again  Tylenol and Ibuprofen are always fine, unless you are taking any narcotic pain medication containing Tylenol (such as Percocet, Darvocet, Vicodin, or anything containing acetaminophen)  Do not take Tylenol if you're taking these medications  You do not need to take the narcotic pain medications unless you are having significant pain and discomfort  7  Driving: You will need someone to drive you home on the day of surgery   Do not drive or make any important decisions while on narcotic pain medication or 24 hours and after anesthesia or sedation for surgery  Generally, you may drive when your off all narcotic pain medications  8  Upset Stomach: You may take Maalox, Tums, or similar items for an upset stomach  If your narcotic pain medication causes an upset stomach, do not take it on an empty stomach  Try taking it with at least some crackers or toast      9  Constipation: Patients often experienced constipation after surgery  You may take over-the-counter medication for this, such as Metamucil, Senokot, Dulcolax, milk of magnesia, etc  You may take a suppository unless you have had anorectal surgery such as a procedure on your hemorrhoids  If you experience significant nausea or vomiting after abdominal surgery, call the office before trying any of these medications  10  Call the office: If you are experiencing any of the following, fevers above 101 5°, significant nausea or vomiting, if the wound develops drainage and/or is excessive redness around the wound, or if you have significant diarrhea or other worsening symptoms  11  Pain: You may be given a prescription for pain  This will be given to the hospital, the day of surgery  12  Sexual Activity: You may resume sexual activity when you feel ready and comfortable and your incision is sealed and healed without apparent infection risk  13  Urination: If you haven't urinated in 6 hours, go directly to the ER for evaluation for urinary retention  14  Follow-up in 2 weeks          [facility]  Phone: [phone]

## 2020-07-14 NOTE — INTERVAL H&P NOTE
H&P reviewed  After examining the patient I find no changes in the patients condition since the H&P had been written      Vitals:    07/14/20 0844   BP: (!) 178/81   Pulse:    Resp:    Temp:    SpO2:

## 2020-07-14 NOTE — INTERIM OP NOTE
REPAIR HERNIA INGUINAL and femoral hernia, LAPAROSCOPIC  Postoperative Note  PATIENT NAME: Song Schuster  : 1945  MRN: [id number]  [facility] OR ROOM 02    Surgery Date: 2020    Preop Diagnosis:  Non-recurrent unilateral inguinal hernia without obstruction or gangrene [K40 90]    Post-Op Diagnosis Codes:     * Non-recurrent unilateral inguinal hernia without obstruction or gangrene [K40 90]  Right Femoral hernia    Procedure(s) (LRB):  REPAIR HERNIA INGUINAL and femoral hernia, LAPAROSCOPIC (Right)    Surgeon(s) and Role:     * Tawanda Stewart MD - Primary     * Palatine Joel Mario PA-C - Assisting    Specimens:  * No specimens in log *    Estimated Blood Loss:   50 mL    Anesthesia Type:   General ETA    Findings:   Right indirect inguinal and right femoral hernias  Complications:   None    Hernia Surgery Operative Note    Name: Song Schuster    Gender: female    Age: 69 y o  Race:     BMI: Body mass index is 28 9 kg/m²      DIAGNOSIS:   1  Non-recurrent unilateral inguinal hernia without obstruction or gangrene  ceFAZolin (ANCEF) IVPB (premix) 2,000 mg 50 mL    lactated ringers infusion    Reason for no Mechanical VTE Prophylaxis    Reason for no Mechanical VTE Prophylaxis       Diabetes Mellitis: No    Coronary Heart Disease: No    Cancer: No    Steroid Use: No    Tobacco use: Yes   Last used:    Type: Cigarettes   Frequency (per day): 0 5 packs/day   Duration (years): 5    Alcohol use: No    Location of Hernia: Right indirect inguinal hernia  Length: 2 0 cm  Width: 1 5 cm  Primary: Yes  Recurrent: No   Number of recurrences: 0    Access: Laparoscope    Component Separation: No    Mesh:   Yes -  Type: Synthetic   Right  Anatomical ProGrip    Location of Hernia: Right femoral hernia  Length: 1 0 cm   Width: 1 0 cm  Primary: Yes  Recurrent: No   Number of recurrences: 0    Access: Laparoscope    Component Separation: No    Mesh:   Yes -  Type: Synthetic  Right Anatomical ProGrip    Operative Time: 30 minutes           SIGNATURE: Tobey Lefort Astl-Reimer, PA-C   DATE: July 14, 2020   TIME: 10:56 AM

## 2020-07-15 ENCOUNTER — TELEPHONE (OUTPATIENT)
Dept: SURGERY | Facility: CLINIC | Age: 75
End: 2020-07-15

## 2020-07-15 NOTE — TELEPHONE ENCOUNTER
Patient called with c/o nausea  Zofran ODT 4mg , to take q6 hour   [facility] [place] per Sidney Lynn MD

## 2020-07-15 NOTE — TELEPHONE ENCOUNTER
Phone call to patient p/o- she was able to urinate around 8pm last night  Stated she went "A LOT"  Feeling much better  Had a question about a lump at umbilicus  "is it normal"  Explained this should subside  Apply Ice and keep an eye on it  If it becomes larger or painful w/fever she should call the office  Patient and daughter understood  Patient also informed she may take milk of magnesia for constipation

## 2020-07-17 NOTE — OP NOTE
Inguinal Hernia, Laparocopic, Procedure Note    Name: Harry Roque   : 1945  MRN: [id number]  Date: 2020    Indications: The patient presented with a history of a right, reducible inguinal hernia  Pre-operative Diagnosis: right reducible inguinal and femoral hernia    Post-operative Diagnosis: right reducible indirect inguinal and femoral hernia    Procedure: Laparoscopic repair of right inguinal and femoral hernia with mesh, Laparoscopic assisted right TAP block    Surgeon: Mandeep Ag MD  Assistants: Lyric Mario PA-C, no qualified resident available  PA was medically necessary for the surgical safety of the case, including suturing, retraction, hemostasis  Anesthesia: General endotracheal anesthesia    Procedure Details   The patient was seen again in the Holding Room  The risks, benefits, complications, treatment options, and expected outcomes were discussed with the patient  The possibilities of reaction to medication, pulmonary aspiration, perforation of viscus, bleeding, postoperative short or long term nerve entrapment causing pain,recurrent infection, the need for additional procedures, and development of a complication requiring transfusion or further operation were discussed with the patient and/or family  There was concurrence with the proposed plan, and informed consent was obtained  The site of surgery was properly noted/marked  The patient was taken to the Operating Room, identified as Harry Roque and the procedure verified as hernia repair  A Time Out was held and the above information confirmed  The patient was prepped and draped in a sterile fashion  A timeout was again performed  Local anesthesia was used in the incision  An umbilical incision was made  Dissection carried out to the fascia which was grasped with Kocher's and elevated  The fascia was incised and 0-Vicryl stay sutures were placed   A jacinto trocar was inserted into the abdomen and the abdomen was insufflated to appropriate pressure  Two additional 5mm trocars were placed lateral to rectus muscle approximately at the level of the umbilicus  At this point the patient was placed into Trendelenburg position and noted to have right inguinal hernia   The peritoneum was incised from the medial umbilical fold out laterally past the internal ring on the right  Using peanut the superior flap was dissected  Next the direct space was mobilized by exposing Anthony's ligament all the way along its length to the pubic tubercle  There was indirect hernia sac this was carefully mobilized and the round ligament was divided with cautery  The remainder of the inferior flap was created  A femoral hernia was noted and reduced  At this point Progrip mesh was selected and placed into the preperitoneal space and deployed in an appropriate fashion  The peritoneum was closed using a running V-lock suture  The umbilical trocor site was closed with an additional 0-vicryl and tying down the stay sutures   The wound was closed in multiple layers using 3-0 Vicryl sutures and the skin closed using a 4-0 Monocryl subcuticular stitch  The wound was dressed with histacryl  The patient was anatomically correct at the end of the procedure  The patient tolerated the procedure in good condition  Instrument, sponge, and needle counts were correct prior to closure and at the conclusion of the case  This text is generated with voice recognition software  There may be translation, syntax,  or grammatical errors  If you have any questions, please contact the dictating provider  Findings: right reducible indirect inguinal and femoral  hernia    Estimated Blood Loss: Minimal       Specimens: All specimens were sent for pathology  * No orders in the log *         Complications: None; patient tolerated the procedure well             Disposition: PACU            Condition: stable    Signature:   Carleen Benjamin MD  Date: 7/17/2020 Time: 1:42 PM

## 2020-07-27 ENCOUNTER — OFFICE VISIT (OUTPATIENT)
Dept: SURGERY | Facility: HOSPITAL | Age: 75
End: 2020-07-27

## 2020-07-27 VITALS
DIASTOLIC BLOOD PRESSURE: 66 MMHG | WEIGHT: 150 LBS | BODY MASS INDEX: 29.29 KG/M2 | SYSTOLIC BLOOD PRESSURE: 172 MMHG | TEMPERATURE: 98.6 F

## 2020-07-27 DIAGNOSIS — Z98.890 POST-OPERATIVE STATE: Primary | ICD-10-CM

## 2020-07-27 PROCEDURE — 99024 POSTOP FOLLOW-UP VISIT: CPT | Performed by: PHYSICIAN ASSISTANT

## 2020-07-27 NOTE — PATIENT INSTRUCTIONS
You are recovering as expected at this point  Continue to with light activity and refrain from lifting anything greater than 25 pounds  Walking is always good  A of 08/14/2020 you may return to activity as tolerated  He did not require further surgical follow-up  Please call with any questions, changes, concerns

## 2020-07-27 NOTE — PROGRESS NOTES
Assessment/Plan:   Ronel Oneill is a 69 y  o female who comes in today for postoperative check after laparoscopic repair of right inguinal and right femoral hernias with mesh  Patient has done well  She had some trouble urinating immediately after the procedure but this resolved by the postoperative evening  She used narcotic pain medication for only 2 days postoperatively  She is now using Tylenol as needed  She has returned to normal activities and is walking  She is eating well and moving her bowels  She has not had any fevers or chills  On exam her abdomen is benign  Her hernia repair is intact  There is no swelling or ecchymosis in her right groin area  Her incision sites are intact and healing well  At this point patient should continue to refrain from strenuous exercise and avoid lifting anything greater than 25 pounds until 4 weeks postoperatively  As of 08/14/2020 the patient may return to activity as tolerated  She does not require further surgical follow-up  She should continue to call with any changes, questions, or concerns  HPI:  Ronel Oneill is a 69 y  o female who comes in today for postoperative check after recent   right inguinal and femoral hernia repairs  Currently doing well without problems, no fever or chills,no nausea and no vomiting  Only feels a mild fullness in right groin area  Denies pain  She is eating well  She is moving her bowels  She is walking frequently  She has returned to normal activities  She is following lifting and activity restrictions          ROS:  General ROS: negative for - chills, fatigue, fever or night sweats, weight loss  Respiratory ROS: no cough, shortness of breath, or wheezing  Cardiovascular ROS: no chest pain or dyspnea on exertion  Abdomen: as per HPI  Genito-Urinary ROS: no dysuria, trouble voiding, or hematuria  Musculoskeletal ROS: negative for - gait disturbance, joint pain or muscle pain  Neurological ROS: no TIA or stroke symptoms  Skin ROS: surgical incisions    ALLERGIES  Sulfa antibiotics    Current Outpatient Medications:     acetaminophen (TYLENOL) 500 mg tablet, Take 500 mg by mouth every 6 (six) hours as needed for mild pain, Disp: , Rfl:     amLODIPine (NORVASC) 5 mg tablet, Take 5 mg by mouth daily , Disp: , Rfl:     atenolol-chlorthalidone (TENORETIC) 100-25 mg per tablet, Take 1 tablet by mouth daily , Disp: , Rfl:     gabapentin (NEURONTIN) 300 mg capsule, 2 (two) times a day, Disp: , Rfl:     omeprazole (PriLOSEC OTC) 20 MG tablet, Take 20 mg by mouth daily as needed, Disp: , Rfl:     simvastatin (ZOCOR) 20 mg tablet, Take 20 mg by mouth daily at bedtime , Disp: , Rfl:     zolpidem (AMBIEN) 10 mg tablet, Take 1/2 tablet BY MOUTH ONCE A DAY AT BEDTIME, Disp: , Rfl:   Past Medical History:   Diagnosis Date    Allergic rhinitis     DDD (degenerative disc disease), lumbar     Diverticulitis large intestine w/o perforation or abscess w/o bleeding     Hemorrhoids     Hernia of abdominal cavity 03/18/2020    History of gallstones     Hyperlipidemia     Hypertension     Lyme disease 1988     Past Surgical History:   Procedure Laterality Date    BAND HEMORRHOIDECTOMY  07/08/2020    CHOLECYSTECTOMY      COLONOSCOPY      FINGER SURGERY      RIGHT SECOND FINGER-"TRIGGER FINGER"    PILONIDAL CYST EXCISION      [place] LAP,INGUINAL HERNIA REPR,INITIAL Right 7/14/2020    Procedure: REPAIR HERNIA INGUINAL and femoral hernia, LAPAROSCOPIC;  Surgeon: Margarito Worrell MD;  Location:  MAIN OR;  Service: General    SHOULDER SURGERY Right      Family History   Problem Relation Age of Onset    Coronary artery disease Mother     Hypertension Mother     Hypotension Father     Emphysema Father     Prostate cancer Father       reports that she quit smoking about 50 years ago  She has a 2 50 pack-year smoking history  She has never used smokeless tobacco  She reports that she drank alcohol   She reports that she has current or past drug history      PHYSICAL EXAM    Vitals:    07/27/20 1311   BP: (!) 172/66   Temp: 98 6 °F (37 °C)       General: normal, cooperative, no distress  Abdominal: soft, nondistended, nontender or normal bowel sounds  Right groin without swelling or ecchymosis  Incision: clean, dry, and intact and healing well    Physical Exam

## 2020-07-31 ENCOUNTER — OFFICE VISIT (OUTPATIENT)
Dept: SURGERY | Facility: HOSPITAL | Age: 75
End: 2020-07-31

## 2020-07-31 VITALS — TEMPERATURE: 98.8 F | WEIGHT: 150 LBS | HEIGHT: 60 IN | BODY MASS INDEX: 29.45 KG/M2

## 2020-07-31 DIAGNOSIS — Z98.890 POST-OPERATIVE STATE: Primary | ICD-10-CM

## 2020-07-31 PROCEDURE — 99024 POSTOP FOLLOW-UP VISIT: CPT | Performed by: PHYSICIAN ASSISTANT

## 2020-07-31 NOTE — PROGRESS NOTES
Assessment/Plan:   Rogelio Yates is a 69 y  o female who comes in today for postoperative check  Patient underwent laparoscopic repair of right inguinal and femoral hernias with mesh  She was seen in the office earlier this week and was doing well  Her hernia repairs are intact  She is eating well and getting around well  Her left incision site has become slightly red over the past few days  She returned for re-evaluation to make sure there is no underlying infection prior to leaving for vacation  On exam patient's incision sites are well healed  Left incision site with mild erythema  Skin edges remain intact  Appears to be irritated by clothing rubbing over the area  There is no associated swelling, induration, fluctuance, increased skin warmth, drainage, or other signs of underlying infection  Assured patient that incision site is not currently infected  Patient should continue to monitor the wound  She should try to wear lower riding pants that do not sit on top of the incision site  She may cover with dry gauze for comfort and to avoid irritation related to clothing  She should monitor for increasing erythema, swelling, tenderness, or drainage from this site  She should call the office with any questions, changes, or concerns  All questions answered      HPI:  Rogelio Yates is a 69 y  o female who comes in today for postoperative check as above  She was seen 4 days ago and was doing well  Since that time she developed some erythema at her left incision site  She is leaving for vacation and is concerned that she may have an underlying infection  Otherwise she continues to do well  She is getting around well  She is eating well moving her bowels  She is following lifting activity instructions     No fevers or chills      ROS:  General ROS: negative for - chills, fatigue, fever or night sweats, weight loss  Respiratory ROS: no cough, shortness of breath, or wheezing  Cardiovascular ROS: no chest pain or dyspnea on exertion  Abdomen ROS: no N/V, change in bowel habits  Genito-Urinary ROS: no dysuria, trouble voiding, or hematuria  Musculoskeletal ROS: negative for - gait disturbance, joint pain or muscle pain  Neurological ROS: no TIA or stroke symptoms  Skin:   Erythema at incision site    ALLERGIES  Sulfa antibiotics    Current Outpatient Medications:     acetaminophen (TYLENOL) 500 mg tablet, Take 500 mg by mouth every 6 (six) hours as needed for mild pain, Disp: , Rfl:     amLODIPine (NORVASC) 5 mg tablet, Take 5 mg by mouth daily , Disp: , Rfl:     atenolol-chlorthalidone (TENORETIC) 100-25 mg per tablet, Take 1 tablet by mouth daily , Disp: , Rfl:     gabapentin (NEURONTIN) 300 mg capsule, 2 (two) times a day, Disp: , Rfl:     omeprazole (PriLOSEC OTC) 20 MG tablet, Take 20 mg by mouth daily as needed, Disp: , Rfl:     simvastatin (ZOCOR) 20 mg tablet, Take 20 mg by mouth daily at bedtime , Disp: , Rfl:     zolpidem (AMBIEN) 10 mg tablet, Take 1/2 tablet BY MOUTH ONCE A DAY AT BEDTIME, Disp: , Rfl:   Past Medical History:   Diagnosis Date    Allergic rhinitis     DDD (degenerative disc disease), lumbar     Diverticulitis large intestine w/o perforation or abscess w/o bleeding     Hemorrhoids     Hernia of abdominal cavity 03/18/2020    History of gallstones     Hyperlipidemia     Hypertension     Lyme disease 1988     Past Surgical History:   Procedure Laterality Date    BAND HEMORRHOIDECTOMY  07/08/2020    CHOLECYSTECTOMY      COLONOSCOPY      FINGER SURGERY      RIGHT SECOND FINGER-"TRIGGER FINGER"    PILONIDAL CYST EXCISION      HI LAP,INGUINAL HERNIA REPR,INITIAL Right 7/14/2020    Procedure: REPAIR HERNIA INGUINAL and femoral hernia, LAPAROSCOPIC;  Surgeon: Sidney Lynn MD;  Location:  MAIN OR;  Service: General    SHOULDER SURGERY Right      Family History   Problem Relation Age of Onset    Coronary artery disease Mother    Joon Goodson Hypertension Mother     Hypotension Father     Emphysema Father     Prostate cancer Father       reports that she quit smoking about 50 years ago  She has a 2 50 pack-year smoking history  She has never used smokeless tobacco  She reports that she drank alcohol  She reports that she has current or past drug history  PHYSICAL EXAM  Vitals:    07/31/20 1110   Temp: 98 8 °F (37 1 °C)       General: normal, cooperative, no distress, talkative  Incision: clean, dry, and intact and healing well  Left incision site with mild surrounding erythema  Skin edges remain intact  No swelling, tenderness, induration, underlying fluctuance, increased skin warmth, drainage, or other signs of infection        Ema Ernst PA-C

## 2020-07-31 NOTE — PATIENT INSTRUCTIONS
Incision site is not currently infected  Appears to be irritated by clothing rubbing over the area  May cover with dry gauze to prevent irritation  Try to wear lower sitting pants to avoid further irritation at this site  Monitor for increasing redness, swelling, pain, or drainage  Call with further questions or concerns

## 2020-08-24 ENCOUNTER — OFFICE VISIT (OUTPATIENT)
Dept: SURGERY | Facility: HOSPITAL | Age: 75
End: 2020-08-24

## 2020-08-24 VITALS
DIASTOLIC BLOOD PRESSURE: 72 MMHG | HEIGHT: 60 IN | BODY MASS INDEX: 29.45 KG/M2 | SYSTOLIC BLOOD PRESSURE: 130 MMHG | RESPIRATION RATE: 16 BRPM | HEART RATE: 76 BPM | WEIGHT: 150 LBS | TEMPERATURE: 99.2 F

## 2020-08-24 DIAGNOSIS — Z98.890 POST-OPERATIVE STATE: Primary | ICD-10-CM

## 2020-08-24 PROCEDURE — 99024 POSTOP FOLLOW-UP VISIT: CPT | Performed by: PHYSICIAN ASSISTANT

## 2020-08-24 NOTE — PROGRESS NOTES
Assessment/Plan:   Lajoyce Blizzard is a 69 y  o female who comes in today for postoperative check 6 weeks after laparoscopic right inguinal and femoral hernia repair with mesh  Patient has done well postoperatively  She did have some in irritation at her umbilical incision site which has improved  Today she complains of a small lump in her right groin that she did not notice initially after surgery but thinks it may have been there as she was not palpating the area because it was tender from surgery  States she noticed it approximately 1 week ago  The area is nontender  It has not gotten smaller or larger  She denies any associated pain, change in bowel habits, nausea or vomiting, fevers or chills  On exam the patient's incisions are well healed  Her right groin hernia repair appears to be intact  She does have a small approximate 2 cm lump in her right groin area most likely associated with underlying hematoma  The area is nontender  It is not reducible  There are no overlying skin changes  Have instructed the patient to monitor this area  Is not likely consistent with a recurrent hernia as it is nontender and not reducible  Patient should use heat to this area  She should call us with any changes, enlargement of this area, associated fevers or chills  If this area does not resorb or decrease in size in the next 4-8 weeks patient should call for re-evaluation and will likely require imaging to rule out any possibility of recurrent hernia  HPI:  Lajoyce Blizzard is a 69 y  o female who comes in today for postoperative check after right inguinal hernia and right femoral hernia repair as above  Patient presents for re-evaluation today as she has noticed a small lump in her right groin  She is unsure if this area was present postoperatively  She says she noticed it about 1 week ago  The area is not tender  It is not reducible    She denies any associated fevers, chills, nausea, vomiting, change in bowel habits  She still complains of mild right-sided tenderness but otherwise is feeling well  She has returned to her normal daily activities      ROS:  General ROS: negative for - chills, fatigue, fever or night sweats, weight loss  Respiratory ROS: no cough, shortness of breath, or wheezing  Cardiovascular ROS: no chest pain or dyspnea on exertion  Abdomen: as per hPI  Genito-Urinary ROS: no dysuria, trouble voiding, or hematuria  Musculoskeletal ROS: negative for - gait disturbance, joint pain or muscle pain  Neurological ROS: no TIA or stroke symptoms  Skin:   No rashes, lesions, open wounds    ALLERGIES  Sulfa antibiotics    Current Outpatient Medications:     amLODIPine (NORVASC) 5 mg tablet, Take 5 mg by mouth daily , Disp: , Rfl:     atenolol-chlorthalidone (TENORETIC) 100-25 mg per tablet, Take 1 tablet by mouth daily , Disp: , Rfl:     gabapentin (NEURONTIN) 300 mg capsule, 2 (two) times a day, Disp: , Rfl:     omeprazole (PriLOSEC OTC) 20 MG tablet, Take 20 mg by mouth daily as needed, Disp: , Rfl:     simvastatin (ZOCOR) 20 mg tablet, Take 20 mg by mouth daily at bedtime , Disp: , Rfl:     zolpidem (AMBIEN) 10 mg tablet, Take 1/2 tablet BY MOUTH ONCE A DAY AT BEDTIME, Disp: , Rfl:     acetaminophen (TYLENOL) 500 mg tablet, Take 500 mg by mouth every 6 (six) hours as needed for mild pain, Disp: , Rfl:   Past Medical History:   Diagnosis Date    Allergic rhinitis     DDD (degenerative disc disease), lumbar     Diverticulitis large intestine w/o perforation or abscess w/o bleeding     Hemorrhoids     Hernia of abdominal cavity 03/18/2020    History of gallstones     Hyperlipidemia     Hypertension     Lyme disease 1988     Past Surgical History:   Procedure Laterality Date    BAND HEMORRHOIDECTOMY  07/08/2020    CHOLECYSTECTOMY      COLONOSCOPY      FINGER SURGERY      RIGHT SECOND FINGER-"TRIGGER FINGER"    PILONIDAL CYST EXCISION      MA LAP,INGUINAL HERNIA REPR,INITIAL Right 7/14/2020    Procedure: REPAIR HERNIA INGUINAL and femoral hernia, LAPAROSCOPIC;  Surgeon: Queenie Govea MD;  Location: [facility];  Service: General    SHOULDER SURGERY Right      Family History   Problem Relation Age of Onset    Coronary artery disease Mother     Hypertension Mother     Hypotension Father     Emphysema Father     Prostate cancer Father       reports that she quit smoking about 50 years ago  She has a 2 50 pack-year smoking history  She has never used smokeless tobacco  She reports previous alcohol use  She reports previous drug use      PHYSICAL EXAM    Vitals:    08/24/20 1338   BP: 130/72   Pulse: 76   Resp: 16   Temp: 99 2 °F (37 3 °C)       General: normal, cooperative, no distress  Abdominal: soft, nondistended, nontender, normal bowel sounds  Right groin without swelling, ecchymosis, overlying skin changes  There is a small palpable approximate 2 cm lump in the right groin that is nontender, non reducible, non mobile most likely consistent with underlying hematoma  Incision: clean, dry, and intact and healing well    Fleet Anu Mario PA-C

## 2020-08-24 NOTE — PATIENT INSTRUCTIONS
Small palpable lump in your right groin is likely an underlying hematoma  You should use heat to this area  Use Tylenol for any discomfort  Call the office for any increase in size, associated pain, fevers or chills  If this area does not get smaller or resorb in the next 4-8 weeks please call for re-evaluation

## 2021-02-18 ENCOUNTER — APPOINTMENT (EMERGENCY)
Dept: CT IMAGING | Facility: HOSPITAL | Age: 76
DRG: 329 | End: 2021-02-18
Payer: COMMERCIAL

## 2021-02-18 ENCOUNTER — ANESTHESIA EVENT (INPATIENT)
Dept: PERIOP | Facility: HOSPITAL | Age: 76
DRG: 329 | End: 2021-02-18
Payer: COMMERCIAL

## 2021-02-18 ENCOUNTER — HOSPITAL ENCOUNTER (INPATIENT)
Facility: HOSPITAL | Age: 76
LOS: 5 days | Discharge: HOME HEALTH | DRG: 329 | End: 2021-02-23
Attending: EMERGENCY MEDICINE | Admitting: SURGERY
Payer: COMMERCIAL

## 2021-02-18 ENCOUNTER — ANESTHESIA (INPATIENT)
Dept: PERIOP | Facility: HOSPITAL | Age: 76
DRG: 329 | End: 2021-02-18
Payer: COMMERCIAL

## 2021-02-18 VITALS — HEART RATE: 78 BPM

## 2021-02-18 DIAGNOSIS — Z93.3 COLOSTOMY STATUS (HCC): ICD-10-CM

## 2021-02-18 DIAGNOSIS — A41.9 SEPSIS (HCC): ICD-10-CM

## 2021-02-18 DIAGNOSIS — K63.1 PERFORATED BOWEL (HCC): Primary | ICD-10-CM

## 2021-02-18 LAB
ALBUMIN SERPL BCP-MCNC: 3.8 G/DL (ref 3.5–5)
ALP SERPL-CCNC: 77 U/L (ref 46–116)
ALT SERPL W P-5'-P-CCNC: 25 U/L (ref 12–78)
ANION GAP SERPL CALCULATED.3IONS-SCNC: 8 MMOL/L (ref 4–13)
APTT PPP: 29 SECONDS (ref 23–37)
AST SERPL W P-5'-P-CCNC: 21 U/L (ref 5–45)
ATRIAL RATE: 75 BPM
BACTERIA UR QL AUTO: NORMAL /HPF
BASOPHILS # BLD MANUAL: 0 THOUSAND/UL (ref 0–0.1)
BASOPHILS NFR MAR MANUAL: 0 % (ref 0–1)
BILIRUB SERPL-MCNC: 1.3 MG/DL (ref 0.2–1)
BILIRUB UR QL STRIP: NEGATIVE
BUN SERPL-MCNC: 20 MG/DL (ref 5–25)
CALCIUM SERPL-MCNC: 9.1 MG/DL (ref 8.3–10.1)
CHLORIDE SERPL-SCNC: 99 MMOL/L (ref 100–108)
CLARITY UR: CLEAR
CO2 SERPL-SCNC: 29 MMOL/L (ref 21–32)
COLOR UR: ABNORMAL
CREAT SERPL-MCNC: 0.9 MG/DL (ref 0.6–1.3)
EOSINOPHIL # BLD MANUAL: 0 THOUSAND/UL (ref 0–0.4)
EOSINOPHIL NFR BLD MANUAL: 0 % (ref 0–6)
ERYTHROCYTE [DISTWIDTH] IN BLOOD BY AUTOMATED COUNT: 12.5 % (ref 11.6–15.1)
GFR SERPL CREATININE-BSD FRML MDRD: 64 ML/MIN/1.73SQ M
GLUCOSE SERPL-MCNC: 103 MG/DL (ref 65–140)
GLUCOSE UR STRIP-MCNC: NEGATIVE MG/DL
HCT VFR BLD AUTO: 39.8 % (ref 34.8–46.1)
HGB BLD-MCNC: 13.5 G/DL (ref 11.5–15.4)
HGB UR QL STRIP.AUTO: NEGATIVE
INR PPP: 1.12 (ref 0.84–1.19)
KETONES UR STRIP-MCNC: NEGATIVE MG/DL
LACTATE SERPL-SCNC: 1.8 MMOL/L (ref 0.5–2)
LEUKOCYTE ESTERASE UR QL STRIP: ABNORMAL
LIPASE SERPL-CCNC: 104 U/L (ref 73–393)
LYMPHOCYTES # BLD AUTO: 0.34 THOUSAND/UL (ref 0.6–4.47)
LYMPHOCYTES # BLD AUTO: 2 % (ref 14–44)
MAGNESIUM SERPL-MCNC: 1.8 MG/DL (ref 1.6–2.6)
MCH RBC QN AUTO: 30.4 PG (ref 26.8–34.3)
MCHC RBC AUTO-ENTMCNC: 33.9 G/DL (ref 31.4–37.4)
MCV RBC AUTO: 90 FL (ref 82–98)
MONOCYTES # BLD AUTO: 0.34 THOUSAND/UL (ref 0–1.22)
MONOCYTES NFR BLD: 2 % (ref 4–12)
NEUTROPHILS # BLD MANUAL: 15.94 THOUSAND/UL (ref 1.85–7.62)
NEUTS BAND NFR BLD MANUAL: 14 % (ref 0–8)
NEUTS SEG NFR BLD AUTO: 81 % (ref 43–75)
NITRITE UR QL STRIP: NEGATIVE
NON-SQ EPI CELLS URNS QL MICRO: NORMAL /HPF
NRBC BLD AUTO-RTO: 0 /100 WBCS
P AXIS: 65 DEGREES
PH UR STRIP.AUTO: 6 [PH]
PLATELET # BLD AUTO: 229 THOUSANDS/UL (ref 149–390)
PLATELET BLD QL SMEAR: ADEQUATE
PMV BLD AUTO: 9.9 FL (ref 8.9–12.7)
POTASSIUM SERPL-SCNC: 3 MMOL/L (ref 3.5–5.3)
PR INTERVAL: 142 MS
PROT SERPL-MCNC: 7.3 G/DL (ref 6.4–8.2)
PROT UR STRIP-MCNC: NEGATIVE MG/DL
PROTHROMBIN TIME: 14.4 SECONDS (ref 11.6–14.5)
QRS AXIS: 48 DEGREES
QRSD INTERVAL: 82 MS
QT INTERVAL: 414 MS
QTC INTERVAL: 462 MS
RBC # BLD AUTO: 4.44 MILLION/UL (ref 3.81–5.12)
RBC #/AREA URNS AUTO: NORMAL /HPF
RBC MORPH BLD: NORMAL
SODIUM SERPL-SCNC: 136 MMOL/L (ref 136–145)
SP GR UR STRIP.AUTO: 1.01 (ref 1–1.03)
T WAVE AXIS: 61 DEGREES
TOTAL CELLS COUNTED SPEC: 100
TROPONIN I SERPL-MCNC: <0.02 NG/ML
UROBILINOGEN UR QL STRIP.AUTO: 0.2 E.U./DL
VARIANT LYMPHS # BLD AUTO: 1 %
VENTRICULAR RATE: 75 BPM
WBC # BLD AUTO: 16.78 THOUSAND/UL (ref 4.31–10.16)
WBC #/AREA URNS AUTO: NORMAL /HPF

## 2021-02-18 PROCEDURE — 93010 ELECTROCARDIOGRAM REPORT: CPT | Performed by: INTERNAL MEDICINE

## 2021-02-18 PROCEDURE — 36415 COLL VENOUS BLD VENIPUNCTURE: CPT | Performed by: EMERGENCY MEDICINE

## 2021-02-18 PROCEDURE — 85007 BL SMEAR W/DIFF WBC COUNT: CPT | Performed by: EMERGENCY MEDICINE

## 2021-02-18 PROCEDURE — 99222 1ST HOSP IP/OBS MODERATE 55: CPT | Performed by: SURGERY

## 2021-02-18 PROCEDURE — 85730 THROMBOPLASTIN TIME PARTIAL: CPT | Performed by: EMERGENCY MEDICINE

## 2021-02-18 PROCEDURE — G1004 CDSM NDSC: HCPCS

## 2021-02-18 PROCEDURE — 74177 CT ABD & PELVIS W/CONTRAST: CPT

## 2021-02-18 PROCEDURE — 44143 PARTIAL REMOVAL OF COLON: CPT | Performed by: PHYSICIAN ASSISTANT

## 2021-02-18 PROCEDURE — 88307 TISSUE EXAM BY PATHOLOGIST: CPT | Performed by: PATHOLOGY

## 2021-02-18 PROCEDURE — 96368 THER/DIAG CONCURRENT INF: CPT

## 2021-02-18 PROCEDURE — NC001 PR NO CHARGE: Performed by: PHYSICIAN ASSISTANT

## 2021-02-18 PROCEDURE — 84484 ASSAY OF TROPONIN QUANT: CPT | Performed by: EMERGENCY MEDICINE

## 2021-02-18 PROCEDURE — 85027 COMPLETE CBC AUTOMATED: CPT | Performed by: EMERGENCY MEDICINE

## 2021-02-18 PROCEDURE — 96365 THER/PROPH/DIAG IV INF INIT: CPT

## 2021-02-18 PROCEDURE — 96361 HYDRATE IV INFUSION ADD-ON: CPT

## 2021-02-18 PROCEDURE — 80053 COMPREHEN METABOLIC PANEL: CPT | Performed by: EMERGENCY MEDICINE

## 2021-02-18 PROCEDURE — 87040 BLOOD CULTURE FOR BACTERIA: CPT | Performed by: EMERGENCY MEDICINE

## 2021-02-18 PROCEDURE — 96375 TX/PRO/DX INJ NEW DRUG ADDON: CPT

## 2021-02-18 PROCEDURE — 99285 EMERGENCY DEPT VISIT HI MDM: CPT | Performed by: EMERGENCY MEDICINE

## 2021-02-18 PROCEDURE — 83605 ASSAY OF LACTIC ACID: CPT | Performed by: EMERGENCY MEDICINE

## 2021-02-18 PROCEDURE — 96366 THER/PROPH/DIAG IV INF ADDON: CPT

## 2021-02-18 PROCEDURE — 99285 EMERGENCY DEPT VISIT HI MDM: CPT

## 2021-02-18 PROCEDURE — 81001 URINALYSIS AUTO W/SCOPE: CPT | Performed by: EMERGENCY MEDICINE

## 2021-02-18 PROCEDURE — 83735 ASSAY OF MAGNESIUM: CPT | Performed by: EMERGENCY MEDICINE

## 2021-02-18 PROCEDURE — 93005 ELECTROCARDIOGRAM TRACING: CPT

## 2021-02-18 PROCEDURE — 85610 PROTHROMBIN TIME: CPT | Performed by: EMERGENCY MEDICINE

## 2021-02-18 PROCEDURE — 83690 ASSAY OF LIPASE: CPT | Performed by: EMERGENCY MEDICINE

## 2021-02-18 RX ORDER — HYDROMORPHONE HCL/PF 1 MG/ML
0.2 SYRINGE (ML) INJECTION
Status: DISCONTINUED | OUTPATIENT
Start: 2021-02-18 | End: 2021-02-19

## 2021-02-18 RX ORDER — POTASSIUM CHLORIDE 14.9 MG/ML
20 INJECTION INTRAVENOUS ONCE
Status: COMPLETED | OUTPATIENT
Start: 2021-02-18 | End: 2021-02-18

## 2021-02-18 RX ORDER — ATENOLOL 50 MG/1
100 TABLET ORAL DAILY
Status: DISCONTINUED | OUTPATIENT
Start: 2021-02-19 | End: 2021-02-23 | Stop reason: HOSPADM

## 2021-02-18 RX ORDER — CEFTRIAXONE 1 G/50ML
1000 INJECTION, SOLUTION INTRAVENOUS EVERY 24 HOURS
Status: DISCONTINUED | OUTPATIENT
Start: 2021-02-19 | End: 2021-02-23 | Stop reason: HOSPADM

## 2021-02-18 RX ORDER — SODIUM CHLORIDE, SODIUM LACTATE, POTASSIUM CHLORIDE, CALCIUM CHLORIDE 600; 310; 30; 20 MG/100ML; MG/100ML; MG/100ML; MG/100ML
INJECTION, SOLUTION INTRAVENOUS CONTINUOUS PRN
Status: DISCONTINUED | OUTPATIENT
Start: 2021-02-18 | End: 2021-02-18

## 2021-02-18 RX ORDER — HEPARIN SODIUM 5000 [USP'U]/ML
5000 INJECTION, SOLUTION INTRAVENOUS; SUBCUTANEOUS EVERY 8 HOURS SCHEDULED
Status: DISCONTINUED | OUTPATIENT
Start: 2021-02-18 | End: 2021-02-23 | Stop reason: HOSPADM

## 2021-02-18 RX ORDER — HYDROMORPHONE HCL/PF 1 MG/ML
0.5 SYRINGE (ML) INJECTION
Status: DISCONTINUED | OUTPATIENT
Start: 2021-02-18 | End: 2021-02-19

## 2021-02-18 RX ORDER — MAGNESIUM HYDROXIDE 1200 MG/15ML
LIQUID ORAL AS NEEDED
Status: DISCONTINUED | OUTPATIENT
Start: 2021-02-18 | End: 2021-02-18 | Stop reason: HOSPADM

## 2021-02-18 RX ORDER — CEFAZOLIN SODIUM 1 G/50ML
1000 SOLUTION INTRAVENOUS ONCE
Status: COMPLETED | OUTPATIENT
Start: 2021-02-18 | End: 2021-02-18

## 2021-02-18 RX ORDER — HYDROMORPHONE HCL/PF 1 MG/ML
0.6 SYRINGE (ML) INJECTION ONCE
Status: DISCONTINUED | OUTPATIENT
Start: 2021-02-18 | End: 2021-02-23 | Stop reason: HOSPADM

## 2021-02-18 RX ORDER — MIDAZOLAM HYDROCHLORIDE 2 MG/2ML
INJECTION, SOLUTION INTRAMUSCULAR; INTRAVENOUS AS NEEDED
Status: DISCONTINUED | OUTPATIENT
Start: 2021-02-18 | End: 2021-02-18

## 2021-02-18 RX ORDER — CHLORTHALIDONE 25 MG/1
25 TABLET ORAL DAILY
Status: DISCONTINUED | OUTPATIENT
Start: 2021-02-19 | End: 2021-02-23 | Stop reason: HOSPADM

## 2021-02-18 RX ORDER — ONDANSETRON 2 MG/ML
4 INJECTION INTRAMUSCULAR; INTRAVENOUS EVERY 4 HOURS PRN
Status: DISCONTINUED | OUTPATIENT
Start: 2021-02-18 | End: 2021-02-22

## 2021-02-18 RX ORDER — BUPIVACAINE HYDROCHLORIDE 5 MG/ML
INJECTION, SOLUTION EPIDURAL; INTRACAUDAL AS NEEDED
Status: DISCONTINUED | OUTPATIENT
Start: 2021-02-18 | End: 2021-02-18 | Stop reason: HOSPADM

## 2021-02-18 RX ORDER — GLYCOPYRROLATE 0.2 MG/ML
INJECTION INTRAMUSCULAR; INTRAVENOUS AS NEEDED
Status: DISCONTINUED | OUTPATIENT
Start: 2021-02-18 | End: 2021-02-18

## 2021-02-18 RX ORDER — LIDOCAINE HYDROCHLORIDE 10 MG/ML
INJECTION, SOLUTION EPIDURAL; INFILTRATION; INTRACAUDAL; PERINEURAL AS NEEDED
Status: DISCONTINUED | OUTPATIENT
Start: 2021-02-18 | End: 2021-02-18

## 2021-02-18 RX ORDER — EPHEDRINE SULFATE 50 MG/ML
INJECTION INTRAVENOUS AS NEEDED
Status: DISCONTINUED | OUTPATIENT
Start: 2021-02-18 | End: 2021-02-18

## 2021-02-18 RX ORDER — SUCCINYLCHOLINE/SOD CL,ISO/PF 100 MG/5ML
SYRINGE (ML) INTRAVENOUS AS NEEDED
Status: DISCONTINUED | OUTPATIENT
Start: 2021-02-18 | End: 2021-02-18

## 2021-02-18 RX ORDER — ROCURONIUM BROMIDE 10 MG/ML
INJECTION, SOLUTION INTRAVENOUS AS NEEDED
Status: DISCONTINUED | OUTPATIENT
Start: 2021-02-18 | End: 2021-02-18

## 2021-02-18 RX ORDER — FENTANYL CITRATE 50 UG/ML
INJECTION, SOLUTION INTRAMUSCULAR; INTRAVENOUS AS NEEDED
Status: DISCONTINUED | OUTPATIENT
Start: 2021-02-18 | End: 2021-02-18

## 2021-02-18 RX ORDER — NEOSTIGMINE METHYLSULFATE 1 MG/ML
INJECTION INTRAVENOUS AS NEEDED
Status: DISCONTINUED | OUTPATIENT
Start: 2021-02-18 | End: 2021-02-18

## 2021-02-18 RX ORDER — HYDROMORPHONE HCL/PF 1 MG/ML
0.5 SYRINGE (ML) INJECTION
Status: DISCONTINUED | OUTPATIENT
Start: 2021-02-18 | End: 2021-02-18 | Stop reason: HOSPADM

## 2021-02-18 RX ORDER — MORPHINE SULFATE 4 MG/ML
4 INJECTION, SOLUTION INTRAMUSCULAR; INTRAVENOUS ONCE
Status: COMPLETED | OUTPATIENT
Start: 2021-02-18 | End: 2021-02-18

## 2021-02-18 RX ORDER — FENTANYL CITRATE/PF 50 MCG/ML
25 SYRINGE (ML) INJECTION
Status: DISCONTINUED | OUTPATIENT
Start: 2021-02-18 | End: 2021-02-18 | Stop reason: HOSPADM

## 2021-02-18 RX ORDER — ONDANSETRON 2 MG/ML
4 INJECTION INTRAMUSCULAR; INTRAVENOUS ONCE
Status: COMPLETED | OUTPATIENT
Start: 2021-02-18 | End: 2021-02-18

## 2021-02-18 RX ORDER — PROPOFOL 10 MG/ML
INJECTION, EMULSION INTRAVENOUS AS NEEDED
Status: DISCONTINUED | OUTPATIENT
Start: 2021-02-18 | End: 2021-02-18

## 2021-02-18 RX ORDER — ONDANSETRON 2 MG/ML
INJECTION INTRAMUSCULAR; INTRAVENOUS AS NEEDED
Status: DISCONTINUED | OUTPATIENT
Start: 2021-02-18 | End: 2021-02-18

## 2021-02-18 RX ORDER — SODIUM CHLORIDE, SODIUM LACTATE, POTASSIUM CHLORIDE, CALCIUM CHLORIDE 600; 310; 30; 20 MG/100ML; MG/100ML; MG/100ML; MG/100ML
125 INJECTION, SOLUTION INTRAVENOUS CONTINUOUS
Status: DISCONTINUED | OUTPATIENT
Start: 2021-02-19 | End: 2021-02-20

## 2021-02-18 RX ORDER — ACETAMINOPHEN 325 MG/1
650 TABLET ORAL EVERY 6 HOURS PRN
Status: DISCONTINUED | OUTPATIENT
Start: 2021-02-18 | End: 2021-02-19

## 2021-02-18 RX ORDER — SODIUM CHLORIDE, SODIUM LACTATE, POTASSIUM CHLORIDE, CALCIUM CHLORIDE 600; 310; 30; 20 MG/100ML; MG/100ML; MG/100ML; MG/100ML
100 INJECTION, SOLUTION INTRAVENOUS CONTINUOUS
Status: DISCONTINUED | OUTPATIENT
Start: 2021-02-18 | End: 2021-02-18

## 2021-02-18 RX ADMIN — PIPERACILLIN AND TAZOBACTAM 3.38 G: 3; .375 INJECTION, POWDER, LYOPHILIZED, FOR SOLUTION INTRAVENOUS at 16:58

## 2021-02-18 RX ADMIN — MIDAZOLAM 1 MG: 1 INJECTION INTRAMUSCULAR; INTRAVENOUS at 18:48

## 2021-02-18 RX ADMIN — EPHEDRINE SULFATE 5 MG: 50 INJECTION, SOLUTION INTRAVENOUS at 19:10

## 2021-02-18 RX ADMIN — METRONIDAZOLE 500 MG: 500 INJECTION, SOLUTION INTRAVENOUS at 19:00

## 2021-02-18 RX ADMIN — FENTANYL CITRATE 25 MCG: 50 INJECTION, SOLUTION INTRAMUSCULAR; INTRAVENOUS at 20:23

## 2021-02-18 RX ADMIN — LIDOCAINE HYDROCHLORIDE 50 MG: 10 INJECTION, SOLUTION EPIDURAL; INFILTRATION; INTRACAUDAL; PERINEURAL at 18:55

## 2021-02-18 RX ADMIN — MIDAZOLAM 1 MG: 1 INJECTION INTRAMUSCULAR; INTRAVENOUS at 18:42

## 2021-02-18 RX ADMIN — FENTANYL CITRATE 25 MCG: 50 INJECTION, SOLUTION INTRAMUSCULAR; INTRAVENOUS at 18:42

## 2021-02-18 RX ADMIN — SODIUM CHLORIDE 500 ML: 0.9 INJECTION, SOLUTION INTRAVENOUS at 16:31

## 2021-02-18 RX ADMIN — PHENYLEPHRINE HYDROCHLORIDE 100 MCG: 10 INJECTION INTRAVENOUS at 18:59

## 2021-02-18 RX ADMIN — ROCURONIUM BROMIDE 10 MG: 10 INJECTION, SOLUTION INTRAVENOUS at 20:16

## 2021-02-18 RX ADMIN — HYDROMORPHONE HYDROCHLORIDE 0.5 MG: 1 INJECTION, SOLUTION INTRAMUSCULAR; INTRAVENOUS; SUBCUTANEOUS at 23:26

## 2021-02-18 RX ADMIN — SODIUM CHLORIDE, SODIUM LACTATE, POTASSIUM CHLORIDE, AND CALCIUM CHLORIDE: .6; .31; .03; .02 INJECTION, SOLUTION INTRAVENOUS at 23:08

## 2021-02-18 RX ADMIN — MORPHINE SULFATE 4 MG: 4 INJECTION INTRAVENOUS at 15:52

## 2021-02-18 RX ADMIN — FENTANYL CITRATE 25 MCG: 50 INJECTION, SOLUTION INTRAMUSCULAR; INTRAVENOUS at 22:30

## 2021-02-18 RX ADMIN — ONDANSETRON 4 MG: 2 INJECTION INTRAMUSCULAR; INTRAVENOUS at 22:35

## 2021-02-18 RX ADMIN — POTASSIUM CHLORIDE 20 MEQ: 14.9 INJECTION, SOLUTION INTRAVENOUS at 16:31

## 2021-02-18 RX ADMIN — ROCURONIUM BROMIDE 25 MG: 10 INJECTION, SOLUTION INTRAVENOUS at 19:06

## 2021-02-18 RX ADMIN — CEFAZOLIN SODIUM 1000 MG: 1 SOLUTION INTRAVENOUS at 18:51

## 2021-02-18 RX ADMIN — ROCURONIUM BROMIDE 5 MG: 10 INJECTION, SOLUTION INTRAVENOUS at 18:53

## 2021-02-18 RX ADMIN — SODIUM CHLORIDE, SODIUM LACTATE, POTASSIUM CHLORIDE, AND CALCIUM CHLORIDE 125 ML/HR: .6; .31; .03; .02 INJECTION, SOLUTION INTRAVENOUS at 23:58

## 2021-02-18 RX ADMIN — NEOSTIGMINE METHYLSULFATE 2 MG: 1 INJECTION INTRAVENOUS at 22:36

## 2021-02-18 RX ADMIN — IOHEXOL 100 ML: 350 INJECTION, SOLUTION INTRAVENOUS at 16:24

## 2021-02-18 RX ADMIN — FENTANYL CITRATE 25 MCG: 50 INJECTION, SOLUTION INTRAMUSCULAR; INTRAVENOUS at 23:10

## 2021-02-18 RX ADMIN — PROPOFOL 150 MG: 10 INJECTION, EMULSION INTRAVENOUS at 18:55

## 2021-02-18 RX ADMIN — SODIUM CHLORIDE 500 ML: 0.9 INJECTION, SOLUTION INTRAVENOUS at 14:50

## 2021-02-18 RX ADMIN — FENTANYL CITRATE 25 MCG: 50 INJECTION, SOLUTION INTRAMUSCULAR; INTRAVENOUS at 18:52

## 2021-02-18 RX ADMIN — ROCURONIUM BROMIDE 10 MG: 10 INJECTION, SOLUTION INTRAVENOUS at 21:32

## 2021-02-18 RX ADMIN — FENTANYL CITRATE 25 MCG: 50 INJECTION, SOLUTION INTRAMUSCULAR; INTRAVENOUS at 22:54

## 2021-02-18 RX ADMIN — SODIUM CHLORIDE, SODIUM LACTATE, POTASSIUM CHLORIDE, AND CALCIUM CHLORIDE: .6; .31; .03; .02 INJECTION, SOLUTION INTRAVENOUS at 18:42

## 2021-02-18 RX ADMIN — ONDANSETRON 4 MG: 2 INJECTION INTRAMUSCULAR; INTRAVENOUS at 15:54

## 2021-02-18 RX ADMIN — Medication 80 MG: at 18:55

## 2021-02-18 RX ADMIN — GLYCOPYRROLATE 0.2 MG: 0.2 INJECTION, SOLUTION INTRAMUSCULAR; INTRAVENOUS at 22:36

## 2021-02-18 RX ADMIN — FENTANYL CITRATE 25 MCG: 50 INJECTION, SOLUTION INTRAMUSCULAR; INTRAVENOUS at 23:19

## 2021-02-18 RX ADMIN — FENTANYL CITRATE 25 MCG: 50 INJECTION, SOLUTION INTRAMUSCULAR; INTRAVENOUS at 21:24

## 2021-02-18 NOTE — ED CARE HANDOFF
Emergency Department Sign Out Note        Sign out and transfer of care from Dr Marc Colunga  See Separate Emergency Department note  The patient, Theodore eL, was evaluated by the previous provider for abdominal pain  Workup Completed:  Examined, labs and imaging reviewed  CT shows pneumoperitoneum with some stranding around the spleen  Surgeon contacted  Surgeon will come here to see patient and decide on disposition  I discussed this with the patient and her visitor  ED Course / Workup Pending (followup):  Dr Mat Adhikari here to see patient  ED Course as of Feb 18 2311   Thu Feb 18, 2021 1817 Spoke with Dr Mat Adhikari after her examination of the patient  Dilaudid ordered as per her request   She will take patient to the OR  Dr Mat Adhikari will write admission orders          Procedures  MDM  Number of Diagnoses or Management Options  Perforated bowel [facility]): new and requires workup  Sepsis [facility]): new and requires workup     Amount and/or Complexity of Data Reviewed  Clinical lab tests: reviewed  Tests in the radiology section of CPT®: reviewed  Discuss the patient with other providers: yes        Disposition  Final diagnoses:   Perforated bowel ([facility] [address] )   Sepsis ([facility] [address] )     Time reflects when diagnosis was documented in both MDM as applicable and the Disposition within this note     Time User Action Codes Description Comment    2/18/2021  4:58 PM Valencia Mc Add [K63 1] Perforated bowel ([facility] [address] )     2/18/2021  5:25 PM Valencia Mc Add [A41 9] Sepsis ([facility] [address] )     2/18/2021  9:05 PM Gail Lucas Modify [K63 1] Perforated bowel [facility])       ED Disposition     ED Disposition Condition Date/Time Comment    Send to OR  Thu Feb 18, 2021  6:17 PM Rose is taking patient to OR for diagnostic lap possible exploratory lap      Follow-up Information    None       Current Discharge Medication List      CONTINUE these medications which have NOT CHANGED    Details   acetaminophen (TYLENOL) 500 mg tablet Take 500 mg by mouth every 6 (six) hours as needed for mild pain      amLODIPine (NORVASC) 5 mg tablet Take 5 mg by mouth daily       atenolol-chlorthalidone (TENORETIC) 100-25 mg per tablet Take 1 tablet by mouth daily       gabapentin (NEURONTIN) 300 mg capsule 2 (two) times a day      omeprazole (PriLOSEC OTC) 20 MG tablet Take 20 mg by mouth daily as needed      simvastatin (ZOCOR) 20 mg tablet Take 20 mg by mouth daily at bedtime       zolpidem (AMBIEN) 10 mg tablet Take 1/2 tablet BY MOUTH ONCE A DAY AT BEDTIME           No discharge procedures on file         ED Provider  Electronically Signed by     Jorge Tate DO  02/18/21 5191

## 2021-02-18 NOTE — SEPSIS NOTE
Sepsis Note   Kyler Parham 70 y o  female MRN: [id number]  Unit/Bed#: ED 04 Encounter: [id number]      qSOFA     [address] Name 02/18/21 1436 02/18/21 1417             Altered mental status GCS < 15  0  --       Respiratory Rate > / =22  --  0       Systolic BP < / =016  --  0       Q Sofa Score  0  0           Initial Sepsis Screening     Row Name 02/18/21 1724 02/18/21 1625             Is the patient's history suggestive of a new or worsening infection?  --  (!) Yes (Proceed)  -VERNA       Suspected source of infection  --  acute abdominal infection  -VERNA       Are two or more of the following signs & symptoms of infection both present and new to the patient?  --  --       Indicate SIRS criteria  --  Leukocytosis (WBC > 436[address] IJL);WBC > 10% bands  -VERNA       If the answer is yes to both questions, suspicion of sepsis is present  --  --       If severe sepsis is present AND tissue hypoperfusion perists in the hour after fluid resuscitation or lactate > 4, the patient meets criteria for SEPTIC SHOCK  --  --       Are any of the following organ dysfunction criteria present within 6 hours of suspected infection and SIRS criteria that are NOT considered to be chronic conditions?   No  -VERNA  --       Organ dysfunction  --  --       Date of presentation of severe sepsis  --  --       Time of presentation of severe sepsis  --  --       Tissue hypoperfusion persists in the hour after crystalloid fluid administration, evidenced, by either:  --  --       Was hypotension present within one hour of the conclusion of crystalloid fluid administration?  --  --       Date of presentation of septic shock  --  --       Time of presentation of septic shock  --  --         User Key  (r) = Recorded By, (t) = Taken By, (c) = Cosigned By    Initials Name Provider Type    [address], DO Physician

## 2021-02-18 NOTE — ANESTHESIA PREPROCEDURE EVALUATION
Procedure:  LAPAROSCOPY DIAGNOSTIC (N/A Abdomen)  LAPAROTOMY EXPLORATORY (N/A Abdomen)    Relevant Problems   ANESTHESIA (within normal limits)      CARDIO   (+) Hyperlipidemia   (+) Hypertension      MUSCULOSKELETAL   (+) DDD (degenerative disc disease), lumbar      Other   (+) Diverticulitis large intestine w/o perforation or abscess w/o bleeding        Physical Exam    Airway    Mallampati score: I  TM Distance: >3 FB  Neck ROM: full     Dental   No notable dental hx     Cardiovascular  Rhythm: regular, Rate: normal,     Pulmonary  Breath sounds clear to auscultation, Decreased breath sounds,     Other Findings        Anesthesia Plan  ASA Score- 2 Emergent    Anesthesia Type- general with ASA Monitors  Additional Monitors:   Airway Plan: ETT  Comment: Pt with free air on CT  Last ate <10 am, liquids at 1300  S/p lumbar surgery 1/21  Plan RSI/ETT  R/B/A d/w pt including post op mech vent and additional monitoring as needed          Plan Factors-Exercise tolerance (METS): >4 METS  Chart reviewed  EKG reviewed  Imaging results reviewed  Existing labs reviewed  Patient is not a current smoker  Induction- intravenous and rapid sequence induction  Postoperative Plan- Plan for postoperative opioid use  Planned trial extubation    Informed Consent- Anesthetic plan and risks discussed with patient

## 2021-02-18 NOTE — ED PROVIDER NOTES
History  Chief Complaint   Patient presents with    Abdominal Pain     lower abd pain started this morning  Patient states that she has had no urinary complaints , has had BM's  Noted chills no fever  Patient with PMH HTN, right inguinal hernia repair , diverticulosis on colonoscopy less than 10 years ago, cholecystectomy , lumbar discectomy , presents with lower abd pain pressure sensation since last night  She had a nice dinner with crab cakes  She had a large loose BM after dinner then started with the pressure  She woke again this morning with continued pressure in lower abd  Pain radiates to upper abd and worse with deep breaths  She took tylenol without relief  She felt chills  Prior to Admission Medications   Prescriptions Last Dose Informant Patient Reported?  Taking?   acetaminophen (TYLENOL) 500 mg tablet   Yes No   Sig: Take 500 mg by mouth every 6 (six) hours as needed for mild pain   amLODIPine (NORVASC) 5 mg tablet   Yes No   Sig: Take 5 mg by mouth daily    atenolol-chlorthalidone (TENORETIC) 100-25 mg per tablet   Yes No   Sig: Take 1 tablet by mouth daily    gabapentin (NEURONTIN) 300 mg capsule   Yes No   Si (two) times a day   omeprazole (PriLOSEC OTC) 20 MG tablet   Yes No   Sig: Take 20 mg by mouth daily as needed   simvastatin (ZOCOR) 20 mg tablet   Yes No   Sig: Take 20 mg by mouth daily at bedtime    zolpidem (AMBIEN) 10 mg tablet   Yes No   Sig: Take 1/2 tablet BY MOUTH ONCE A DAY AT BEDTIME      Facility-Administered Medications: None       Past Medical History:   Diagnosis Date    Allergic rhinitis     DDD (degenerative disc disease), lumbar     Diverticulitis large intestine w/o perforation or abscess w/o bleeding     Hemorrhoids     Hernia of abdominal cavity 2020    History of gallstones     Hyperlipidemia     Hypertension     Lyme disease        Past Surgical History:   Procedure Laterality Date    BAND HEMORRHOIDECTOMY 2020    CHOLECYSTECTOMY      COLONOSCOPY      COLOSTOMY Left 2021    Procedure: COLOSTOMY END creation;  Surgeon: Jaelyn Mullen MD;  Location: [facility];  Service: General    FINGER SURGERY      RIGHT SECOND FINGER-"TRIGGER FINGER"    LAPAROTOMY N/A 2021    Procedure: LAPAROTOMY EXPLORATORY;  Surgeon: Jaelyn Mullen MD;  Location: [facility];  Service: General    PILONIDAL CYST EXCISION      [place] LAP,DIAGNOSTIC ABDOMEN N/A 2021    Procedure: LAPAROSCOPY DIAGNOSTIC;  Surgeon: Jaelyn Mullen MD;  Location: [facility];  Service: General    [place] Alisa Frey 19 Right 2020    Procedure: REPAIR HERNIA INGUINAL and femoral hernia, LAPAROSCOPIC;  Surgeon: Yeimi Garcia MD;  Location: [facility];  Service: General    [place] PART REMOVAL COLON W ANASTOMOSIS N/A 2021    Procedure: RESECTION COLON SIGMOID;  Surgeon: Jaelyn Mullen MD;  Location: [facility];  Service: General    SHOULDER SURGERY Right        Family History   Problem Relation Age of Onset    Coronary artery disease Mother     Hypertension Mother     Hypotension Father     Emphysema Father     Prostate cancer Father      I have reviewed and agree with the history as documented  E-Cigarette/Vaping    E-Cigarette Use Never User      E-Cigarette/Vaping Substances    Nicotine No     THC No     CBD No     Flavoring No     Other No     Unknown No      Social History     Tobacco Use    Smoking status: Former Smoker     Packs/day: 0 50     Years: 5 00     Pack years: 2 50     Quit date:      Years since quittin 1    Smokeless tobacco: Never Used   Substance Use Topics    Alcohol use: Not Currently    Drug use: Not Currently       Review of Systems   Constitutional: Positive for chills and fever  HENT: Negative for ear pain and sore throat  Eyes: Negative for pain and visual disturbance  Respiratory: Negative for cough and shortness of breath      Cardiovascular: Negative for chest pain and palpitations  Gastrointestinal: Positive for abdominal pain and diarrhea  Negative for vomiting  Genitourinary: Negative for dysuria and hematuria  Musculoskeletal: Negative for arthralgias and back pain  Skin: Negative for color change and rash  Neurological: Negative for seizures and syncope  All other systems reviewed and are negative  Physical Exam  Physical Exam  Vitals signs and nursing note reviewed  Constitutional:       General: She is not in acute distress  Appearance: She is well-developed  HENT:      Head: Normocephalic and atraumatic  Eyes:      Conjunctiva/sclera: Conjunctivae normal    Neck:      Musculoskeletal: Neck supple  Cardiovascular:      Rate and Rhythm: Normal rate and regular rhythm  Heart sounds: No murmur  Pulmonary:      Effort: Pulmonary effort is normal  No respiratory distress  Breath sounds: Normal breath sounds  Abdominal:      Palpations: Abdomen is soft  Tenderness: There is abdominal tenderness in the right lower quadrant, suprapubic area and left lower quadrant  There is rebound  There is no right CVA tenderness, left CVA tenderness or guarding  Genitourinary:     Comments: Small bleeding external hemorrhoid, brown stool  Skin:     General: Skin is warm and dry  Neurological:      Mental Status: She is alert and oriented to person, place, and time     Psychiatric:         Mood and Affect: Mood normal          Vital Signs  ED Triage Vitals [02/18/21 1417]   Temperature Pulse Respirations Blood Pressure SpO2   98 6 °F (37 °C) 83 18 (!) 180/78 98 %      Temp Source Heart Rate Source Patient Position - Orthostatic VS BP Location FiO2 (%)   Tympanic Monitor Sitting Right arm --      Pain Score       7           Vitals:    02/19/21 0100 02/19/21 0500 02/19/21 0713 02/19/21 1203   BP: 123/58 118/58 129/61 145/65   Pulse: 75 71 78 80   Patient Position - Orthostatic VS: Lying Lying Lying Sitting Visual Acuity      ED Medications  Medications   HYDROmorphone (DILAUDID) injection 0 6 mg ( Intravenous MAR Unhold 2/18/21 2240)   heparin (porcine) subcutaneous injection 5,000 Units (5,000 Units Subcutaneous Given 2/19/21 1436)   ondansetron (ZOFRAN) injection 4 mg (4 mg Intravenous Given 2/19/21 0750)   atenolol (TENORMIN) tablet 100 mg (100 mg Oral Given 2/19/21 1208)     And   chlorthalidone tablet 25 mg (25 mg Oral Given 2/19/21 1208)   cefTRIAXone (ROCEPHIN) IVPB (premix in dextrose) 1,000 mg 50 mL (has no administration in time range)   metroNIDAZOLE (FLAGYL) IVPB (premix) 500 mg 100 mL (500 mg Intravenous New Bag 2/19/21 1150)   lactated ringers infusion (125 mL/hr Intravenous New Bag 2/18/21 2358)   lactated ringers bolus 1,000 mL (1,000 mL Intravenous New Bag 2/19/21 1351)     And   lactated ringers bolus 1,000 mL (has no administration in time range)   sodium chloride 0 9 % bolus 1,000 mL (has no administration in time range)     And   sodium chloride 0 9 % bolus 1,000 mL (has no administration in time range)   acetaminophen (TYLENOL) tablet 975 mg (975 mg Oral Given 2/19/21 1427)   ketorolac (TORADOL) injection 15 mg (15 mg Intravenous Given 2/19/21 1426)   oxyCODONE (ROXICODONE) IR tablet 2 5 mg (has no administration in time range)   oxyCODONE (ROXICODONE) IR tablet 5 mg (has no administration in time range)   HYDROmorphone (DILAUDID) injection 0 5 mg (has no administration in time range)   calcium carbonate (TUMS) chewable tablet 500 mg (has no administration in time range)   sodium chloride 0 9 % bolus 500 mL (0 mL Intravenous Stopped 2/18/21 1555)   potassium chloride 20 mEq IVPB (premix) (0 mEq Intravenous Stopped 2/18/21 1826)   morphine (PF) 4 mg/mL injection 4 mg (4 mg Intravenous Given 2/18/21 1552)   ondansetron (ZOFRAN) injection 4 mg (4 mg Intravenous Given 2/18/21 1554)   iohexol (OMNIPAQUE) 350 MG/ML injection (MULTI-DOSE) 100 mL (100 mL Intravenous Given 2/18/21 1624)   sodium chloride 0 9 % bolus 500 mL (0 mL Intravenous Stopped 2/18/21 1826)   piperacillin-tazobactam (ZOSYN) IVPB 3 375 g (0 g Intravenous Stopped 2/18/21 1801)   ceFAZolin (ANCEF) IVPB (premix in dextrose) 1,000 mg 50 mL (1,000 mg Intravenous Given 2/18/21 1851)   metroNIDAZOLE (FLAGYL) IVPB (premix) 500 mg 100 mL (500 mg Intravenous Given 2/18/21 1900)   potassium chloride 30 mEq in sodium chloride 0 9 % 100 mL IVPB (30 mEq Intravenous New Bag 2/19/21 1216)       Diagnostic Studies  Results Reviewed     Procedure Component Value Units Date/Time    Blood culture [[id number]] Collected: 02/18/21 1642    Lab Status: Preliminary result Specimen: Blood from Arm, Left Updated: 02/18/21 2202     Blood Culture Received in Microbiology Lab  Culture in Progress  Blood culture [[id number]] Collected: 02/18/21 1642    Lab Status: Preliminary result Specimen: Blood from Hand, Left Updated: 02/18/21 2202     Blood Culture Received in Microbiology Lab  Culture in Progress  Platelet count [[id number]]     Lab Status: No result Specimen: Blood     Urine Microscopic [[id number]]  (Normal) Collected: 02/18/21 1548    Lab Status: Final result Specimen: Urine, Clean Catch Updated: 02/18/21 1722     RBC, UA None Seen /hpf      WBC, UA 1-2 /hpf      Epithelial Cells Occasional /hpf      Bacteria, UA None Seen /hpf     Lactic acid, plasma [[id number]]  (Normal) Collected: 02/18/21 1642    Lab Status: Final result Specimen: Blood from Arm, Left Updated: 02/18/21 1712     LACTIC ACID 1 8 mmol/L     Narrative:      Result may be elevated if tourniquet was used during collection      Protime-INR [[id number]]  (Normal) Collected: 02/18/21 1642    Lab Status: Final result Specimen: Blood from Arm, Left Updated: 02/18/21 1707     Protime 14 4 seconds      INR 1 12    APTT [[id number]]  (Normal) Collected: 02/18/21 1642    Lab Status: Final result Specimen: Blood from Arm, Left Updated: 02/18/21 1707     PTT 29 seconds     UA w Reflex to Microscopic w Reflex to Culture [[id number]]  (Abnormal) Collected: 02/18/21 1548    Lab Status: Final result Specimen: Urine, Clean Catch Updated: 02/18/21 1636     Color, UA Straw     Clarity, UA Clear     Specific Gravity, UA 1 010     pH, UA 6 0     Leukocytes, UA Small     Nitrite, UA Negative     Protein, UA Negative mg/dl      Glucose, UA Negative mg/dl      Ketones, UA Negative mg/dl      Urobilinogen, UA 0 2 E U /dl      Bilirubin, UA Negative     Blood, UA Negative    CBC and differential [[id number]]  (Abnormal) Collected: 02/18/21 1444    Lab Status: Final result Specimen: Blood from Arm, Right Updated: 02/18/21 1609     WBC 16 78 Thousand/uL      RBC 4 44 Million/uL      Hemoglobin 13 5 g/dL      Hematocrit 39 8 %      MCV 90 fL      MCH 30 4 pg      MCHC 33 9 g/dL      RDW 12 5 %      MPV 9 9 fL      Platelets 940 Thousands/uL      nRBC 0 /100 WBCs     Narrative: This is an appended report  These results have been appended to a previously verified report      Manual Differential(PHLEBS Do Not Order) [[id number]]  (Abnormal) Collected: 02/18/21 1444    Lab Status: Final result Specimen: Blood from Arm, Right Updated: 02/18/21 1609     Segmented % 81 %      Bands % 14 %      Lymphocytes % 2 %      Monocytes % 2 %      Eosinophils, % 0 %      Basophils % 0 %      Atypical Lymphocytes % 1 %      Absolute Neutrophils 15 94 Thousand/uL      Lymphocytes Absolute 0 34 Thousand/uL      Monocytes Absolute 0 34 Thousand/uL      Eosinophils Absolute 0 00 Thousand/uL      Basophils Absolute 0 00 Thousand/uL      Total Counted 100     RBC Morphology Normal     Platelet Estimate Adequate    Troponin I [[id number]]  (Normal) Collected: 02/18/21 1444    Lab Status: Final result Specimen: Blood from Arm, Right Updated: 02/18/21 1507     Troponin I <0 02 ng/mL     Lipase [[id number]]  (Normal) Collected: 02/18/21 1444    Lab Status: Final result Specimen: Blood from Arm, Right Updated: 02/18/21 1504     Lipase 104 u/L     CMP [[id number]]  (Abnormal) Collected: 02/18/21 1444    Lab Status: Final result Specimen: Blood from Arm, Right Updated: 02/18/21 1504     Sodium 136 mmol/L      Potassium 3 0 mmol/L      Chloride 99 mmol/L      CO2 29 mmol/L      ANION GAP 8 mmol/L      BUN 20 mg/dL      Creatinine 0 90 mg/dL      Glucose 103 mg/dL      Calcium 9 1 mg/dL      AST 21 U/L      ALT 25 U/L      Alkaline Phosphatase 77 U/L      Total Protein 7 3 g/dL      Albumin 3 8 g/dL      Total Bilirubin 1 30 mg/dL      eGFR 64 ml/min/1 73sq m     Narrative:      [place] guidelines for Chronic Kidney Disease (CKD):     Stage 1 with normal or high GFR (GFR > 90 mL/min/1 73 square meters)    Stage 2 Mild CKD (GFR = 60-89 mL/min/1 73 square meters)    Stage 3A Moderate CKD (GFR = 45-59 mL/min/1 73 square meters)    Stage 3B Moderate CKD (GFR = 30-44 mL/min/1 73 square meters)    Stage 4 Severe CKD (GFR = 15-29 mL/min/1 73 square meters)    Stage 5 End Stage CKD (GFR <15 mL/min/1 73 square meters)  Note: GFR calculation is accurate only with a steady state creatinine    Magnesium [[id number]]  (Normal) Collected: 02/18/21 1444    Lab Status: Final result Specimen: Blood from Arm, Right Updated: 02/18/21 1504     Magnesium 1 8 mg/dL                  CT Abdomen pelvis with contrast   Final Result by Alonso Rinne, MD (02/18 1734)      1  Free air consistent with perforated viscus though a definitive site of perforation is not identified  Most likely source is the lower GI tract, likely secondary to diverticulitis  While there is slight stranding at the splenic flexure, there is no    evidence of perforation at this site and perforation may be at the sigmoid colon with more severe diverticulosis and adjacent locules of gas  2   Probable reactive small bowel ileus related to peritonitis  3   Foci of high density in the distal ileum of uncertain etiology   This likely represents ingested material though foci of active bleeding cannot be entirely excluded  Correlation with signs and symptoms of GI bleed recommended  I personally discussed this study with Mark Fam on 2/18/2021 at 4:54 PM                Workstation performed: [id number]                    Procedures  ECG 12 Lead Documentation Only    Date/Time: 2/18/2021 3:16 PM  Performed by: Aron Ernst DO  Authorized by: Aron Ernst DO     Indications / Diagnosis:  Abd pain  ECG reviewed by me, the ED Provider: yes    Patient location:  ED  Previous ECG:     Previous ECG:  Compared to current    Comparison ECG info:  2020    Similarity:  Changes noted  Interpretation:     Interpretation: non-specific    Rate:     ECG rate:  75    ECG rate assessment: normal    Rhythm:     Rhythm: sinus rhythm    Ectopy:     Ectopy: none    QRS:     QRS axis:  Normal    QRS intervals:  Normal  Conduction:     Conduction: normal    ST segments:     ST segments:  Non-specific    Depression:  II  T waves:     T waves: normal               ED Course  ED Course as of Feb 19 1746   Thu Feb 18, 2021   1658 Texted surgery to review      [address] [facility] Dr Stephenson texted me with concern for densities in distal ileum - pills or bleeding  Patient denies any blood in stool but she has small bleeding external hemorrhoid  She did admit to eating a lot of pistachios and cashews  18 Dr Miguelina Burger coming to see patient  1725 Sign out to [facility] - perforated bowel surgery coming to see, received IV abx                              Initial Sepsis Screening     Row Name 02/18/21 1724 02/18/21 1625             Is the patient's history suggestive of a new or worsening infection?  --  (!) Yes (Proceed)  -VERNA       Suspected source of infection  --  acute abdominal infection  -VERNA       Are two or more of the following signs & symptoms of infection both present and new to the patient?  --  --       Indicate SIRS criteria  --  Leukocytosis (WBC > 16009 IJL);WBC > 10% bands  -VERNA       If the answer is yes to both questions, suspicion of sepsis is present  --  --       If severe sepsis is present AND tissue hypoperfusion perists in the hour after fluid resuscitation or lactate > 4, the patient meets criteria for SEPTIC SHOCK  --  --       Are any of the following organ dysfunction criteria present within 6 hours of suspected infection and SIRS criteria that are NOT considered to be chronic conditions? No  -VERNA  --       Organ dysfunction  --  --       Date of presentation of severe sepsis  --  --       Time of presentation of severe sepsis  --  --       Tissue hypoperfusion persists in the hour after crystalloid fluid administration, evidenced, by either:  --  --       Was hypotension present within one hour of the conclusion of crystalloid fluid administration?  --  --       Date of presentation of septic shock  --  --       Time of presentation of septic shock  --  --         User Key  (r) = Recorded By, (t) = Taken By, (c) = Cosigned By    [address] Name Provider Type    [address], DO Physician          SBIRT 20yo+      Most Recent Value   SBIRT (23 yo +)   In order to provide better care to our patients, we are screening all of our patients for alcohol and drug use  Would it be okay to ask you these screening questions? Yes Filed at: 02/18/2021 1436   Initial Alcohol Screen: US AUDIT-C    1  How often do you have a drink containing alcohol?  0 Filed at: 02/18/2021 1436   2  How many drinks containing alcohol do you have on a typical day you are drinking? 0 Filed at: 02/18/2021 1436   3a  Male UNDER 65: How often do you have five or more drinks on one occasion? 0 Filed at: 02/18/2021 1436   3b  FEMALE Any Age, or MALE 65+: How often do you have 4 or more drinks on one occassion? 0 Filed at: 02/18/2021 1436   Audit-C Score  0 Filed at: 02/18/2021 1436   BROWN: How many times in the past year have you    Used an illegal drug or used a prescription medication for non-medical reasons?   Never Filed at: 02/18/2021 1436                    MDM  Number of Diagnoses or Management Options  [facility] new and requires workup  Sepsis [facility]): new and requires workup     Amount and/or Complexity of Data Reviewed  Clinical lab tests: ordered and reviewed  Tests in the radiology section of CPT®: ordered and reviewed  Discuss the patient with other providers: yes    Patient Progress  Patient progress: improved      Disposition  Final diagnoses:   Perforated bowel ([facility] )   Sepsis ([facility] )     Time reflects when diagnosis was documented in both MDM as applicable and the Disposition within this note     Time User Action Codes Description Comment    2/18/2021  4:58 PM Rolly Valladares Add [K63 1] Perforated bowel ([facility] )     2/18/2021  5:25 PM Tamsen Valladares Add [A41 9] Sepsis ([facility] )     2/18/2021  9:05 PM Leonor Lucas Modify [K63 1] Perforated bowel ([facility] )     2/19/2021 12:16 PM Jocelyn Mario Modify [K63 1] Perforated bowel [facility])       ED Disposition     ED Disposition Condition Date/Time Comment    Send to OR  Thu Feb 18, 2021  6:17 PM Rose is taking patient to OR for diagnostic lap possible exploratory lap      Follow-up Information     Follow up With Specialties Details Why Contact Info    Flash Tilley MD General Surgery Follow up in 2 week(s)  [place]  [phone]            Current Discharge Medication List      CONTINUE these medications which have NOT CHANGED    Details   acetaminophen (TYLENOL) 500 mg tablet Take 500 mg by mouth every 6 (six) hours as needed for mild pain      amLODIPine (NORVASC) 5 mg tablet Take 5 mg by mouth daily       atenolol-chlorthalidone (TENORETIC) 100-25 mg per tablet Take 1 tablet by mouth daily       gabapentin (NEURONTIN) 300 mg capsule 2 (two) times a day      omeprazole (PriLOSEC OTC) 20 MG tablet Take 20 mg by mouth daily as needed      simvastatin (ZOCOR) 20 mg tablet Take 20 mg by mouth daily at bedtime       zolpidem (AMBIEN) 10 mg tablet Take 1/2 tablet BY MOUTH ONCE A DAY AT BEDTIME           No discharge procedures on file      PDMP Review       Value Time User    PDMP Reviewed  Yes 7/14/2020 11:00 AM David Mario PA-C          ED Provider  Electronically Signed by           Merary Thomas DO  02/19/21 4057

## 2021-02-18 NOTE — ED NOTES
Pt aware that a urine sample is needed, instructed to ring call bell when she is able to provide sample        Dinora Nation RN  02/18/21 7057

## 2021-02-19 LAB
ANION GAP SERPL CALCULATED.3IONS-SCNC: 6 MMOL/L (ref 4–13)
BUN SERPL-MCNC: 15 MG/DL (ref 5–25)
CALCIUM SERPL-MCNC: 8.1 MG/DL (ref 8.3–10.1)
CHLORIDE SERPL-SCNC: 102 MMOL/L (ref 100–108)
CO2 SERPL-SCNC: 29 MMOL/L (ref 21–32)
CREAT SERPL-MCNC: 0.74 MG/DL (ref 0.6–1.3)
ERYTHROCYTE [DISTWIDTH] IN BLOOD BY AUTOMATED COUNT: 12.9 % (ref 11.6–15.1)
GFR SERPL CREATININE-BSD FRML MDRD: 81 ML/MIN/1.73SQ M
GLUCOSE SERPL-MCNC: 129 MG/DL (ref 65–140)
HCT VFR BLD AUTO: 35.8 % (ref 34.8–46.1)
HGB BLD-MCNC: 12 G/DL (ref 11.5–15.4)
MCH RBC QN AUTO: 30.2 PG (ref 26.8–34.3)
MCHC RBC AUTO-ENTMCNC: 33.5 G/DL (ref 31.4–37.4)
MCV RBC AUTO: 90 FL (ref 82–98)
PLATELET # BLD AUTO: 210 THOUSANDS/UL (ref 149–390)
PMV BLD AUTO: 10.2 FL (ref 8.9–12.7)
POTASSIUM SERPL-SCNC: 3.3 MMOL/L (ref 3.5–5.3)
RBC # BLD AUTO: 3.98 MILLION/UL (ref 3.81–5.12)
SODIUM SERPL-SCNC: 137 MMOL/L (ref 136–145)
WBC # BLD AUTO: 14.85 THOUSAND/UL (ref 4.31–10.16)

## 2021-02-19 PROCEDURE — 0DJD4ZZ INSPECTION OF LOWER INTESTINAL TRACT, PERCUTANEOUS ENDOSCOPIC APPROACH: ICD-10-PCS | Performed by: SURGERY

## 2021-02-19 PROCEDURE — 85027 COMPLETE CBC AUTOMATED: CPT | Performed by: PHYSICIAN ASSISTANT

## 2021-02-19 PROCEDURE — 0DTN0ZZ RESECTION OF SIGMOID COLON, OPEN APPROACH: ICD-10-PCS | Performed by: SURGERY

## 2021-02-19 PROCEDURE — 80048 BASIC METABOLIC PNL TOTAL CA: CPT | Performed by: PHYSICIAN ASSISTANT

## 2021-02-19 PROCEDURE — 44143 PARTIAL REMOVAL OF COLON: CPT | Performed by: SURGERY

## 2021-02-19 PROCEDURE — 0D1M0Z4 BYPASS DESCENDING COLON TO CUTANEOUS, OPEN APPROACH: ICD-10-PCS | Performed by: SURGERY

## 2021-02-19 PROCEDURE — 99024 POSTOP FOLLOW-UP VISIT: CPT | Performed by: PHYSICIAN ASSISTANT

## 2021-02-19 RX ORDER — LABETALOL 20 MG/4 ML (5 MG/ML) INTRAVENOUS SYRINGE
5
Status: DISCONTINUED | OUTPATIENT
Start: 2021-02-19 | End: 2021-02-19

## 2021-02-19 RX ORDER — CALCIUM CARBONATE 200(500)MG
500 TABLET,CHEWABLE ORAL 3 TIMES DAILY PRN
Status: DISCONTINUED | OUTPATIENT
Start: 2021-02-19 | End: 2021-02-23 | Stop reason: HOSPADM

## 2021-02-19 RX ORDER — OXYCODONE HYDROCHLORIDE 5 MG/1
2.5 TABLET ORAL EVERY 4 HOURS PRN
Status: DISCONTINUED | OUTPATIENT
Start: 2021-02-19 | End: 2021-02-23 | Stop reason: HOSPADM

## 2021-02-19 RX ORDER — ACETAMINOPHEN 325 MG/1
975 TABLET ORAL EVERY 6 HOURS SCHEDULED
Status: DISCONTINUED | OUTPATIENT
Start: 2021-02-19 | End: 2021-02-23 | Stop reason: HOSPADM

## 2021-02-19 RX ORDER — HYDROMORPHONE HCL/PF 1 MG/ML
0.5 SYRINGE (ML) INJECTION EVERY 2 HOUR PRN
Status: DISCONTINUED | OUTPATIENT
Start: 2021-02-19 | End: 2021-02-23 | Stop reason: HOSPADM

## 2021-02-19 RX ORDER — OXYCODONE HYDROCHLORIDE 5 MG/1
5 TABLET ORAL EVERY 4 HOURS PRN
Status: DISCONTINUED | OUTPATIENT
Start: 2021-02-19 | End: 2021-02-23 | Stop reason: HOSPADM

## 2021-02-19 RX ORDER — KETOROLAC TROMETHAMINE 30 MG/ML
15 INJECTION, SOLUTION INTRAMUSCULAR; INTRAVENOUS EVERY 6 HOURS SCHEDULED
Status: DISCONTINUED | OUTPATIENT
Start: 2021-02-19 | End: 2021-02-20

## 2021-02-19 RX ADMIN — ACETAMINOPHEN 975 MG: 325 TABLET, FILM COATED ORAL at 23:28

## 2021-02-19 RX ADMIN — HYDROMORPHONE HYDROCHLORIDE 0.5 MG: 1 INJECTION, SOLUTION INTRAMUSCULAR; INTRAVENOUS; SUBCUTANEOUS at 07:46

## 2021-02-19 RX ADMIN — HEPARIN SODIUM 5000 UNITS: 5000 INJECTION INTRAVENOUS; SUBCUTANEOUS at 14:36

## 2021-02-19 RX ADMIN — METRONIDAZOLE 500 MG: 500 INJECTION, SOLUTION INTRAVENOUS at 23:05

## 2021-02-19 RX ADMIN — CALCIUM CARBONATE (ANTACID) CHEW TAB 500 MG 500 MG: 500 CHEW TAB at 23:04

## 2021-02-19 RX ADMIN — ACETAMINOPHEN 975 MG: 325 TABLET, FILM COATED ORAL at 19:40

## 2021-02-19 RX ADMIN — SODIUM CHLORIDE, SODIUM LACTATE, POTASSIUM CHLORIDE, AND CALCIUM CHLORIDE 1000 ML: .6; .31; .03; .02 INJECTION, SOLUTION INTRAVENOUS at 13:51

## 2021-02-19 RX ADMIN — HEPARIN SODIUM 5000 UNITS: 5000 INJECTION INTRAVENOUS; SUBCUTANEOUS at 22:05

## 2021-02-19 RX ADMIN — METRONIDAZOLE 500 MG: 500 INJECTION, SOLUTION INTRAVENOUS at 02:07

## 2021-02-19 RX ADMIN — ONDANSETRON 4 MG: 2 INJECTION INTRAMUSCULAR; INTRAVENOUS at 07:50

## 2021-02-19 RX ADMIN — HYDROMORPHONE HYDROCHLORIDE 0.5 MG: 1 INJECTION, SOLUTION INTRAMUSCULAR; INTRAVENOUS; SUBCUTANEOUS at 11:49

## 2021-02-19 RX ADMIN — ATENOLOL 100 MG: 50 TABLET ORAL at 12:08

## 2021-02-19 RX ADMIN — CHLORTHALIDONE 25 MG: 25 TABLET ORAL at 12:08

## 2021-02-19 RX ADMIN — POTASSIUM CHLORIDE 30 MEQ: 2 INJECTION, SOLUTION, CONCENTRATE INTRAVENOUS at 12:16

## 2021-02-19 RX ADMIN — KETOROLAC TROMETHAMINE 15 MG: 30 INJECTION, SOLUTION INTRAMUSCULAR at 23:10

## 2021-02-19 RX ADMIN — HEPARIN SODIUM 5000 UNITS: 5000 INJECTION INTRAVENOUS; SUBCUTANEOUS at 05:18

## 2021-02-19 RX ADMIN — OXYCODONE HYDROCHLORIDE 5 MG: 5 TABLET ORAL at 22:05

## 2021-02-19 RX ADMIN — ACETAMINOPHEN 975 MG: 325 TABLET, FILM COATED ORAL at 14:27

## 2021-02-19 RX ADMIN — KETOROLAC TROMETHAMINE 15 MG: 30 INJECTION, SOLUTION INTRAMUSCULAR at 14:26

## 2021-02-19 RX ADMIN — CALCIUM CARBONATE (ANTACID) CHEW TAB 500 MG 500 MG: 500 CHEW TAB at 17:55

## 2021-02-19 RX ADMIN — HYDROMORPHONE HYDROCHLORIDE 0.5 MG: 1 INJECTION, SOLUTION INTRAMUSCULAR; INTRAVENOUS; SUBCUTANEOUS at 01:43

## 2021-02-19 RX ADMIN — METRONIDAZOLE 500 MG: 500 INJECTION, SOLUTION INTRAVENOUS at 11:50

## 2021-02-19 NOTE — PLAN OF CARE
Problem: Potential for Falls  Goal: Patient will remain free of falls  Description: INTERVENTIONS:  - Assess patient frequently for physical needs  -  Identify cognitive and physical deficits and behaviors that affect risk of falls    -  [place] fall precautions as indicated by assessment   - Educate patient/family on patient safety including physical limitations  - Instruct patient to call for assistance with activity based on assessment  - Modify environment to reduce risk of injury  - Consider OT/PT consult to assist with strengthening/mobility  Outcome: Progressing     Problem: GASTROINTESTINAL - ADULT  Goal: Minimal or absence of nausea and/or vomiting  Description: INTERVENTIONS:  - Administer IV fluids if ordered to ensure adequate hydration  - Maintain NPO status until nausea and vomiting are resolved  - Nasogastric tube if ordered  - Administer ordered antiemetic medications as needed  - Provide nonpharmacologic comfort measures as appropriate  - Advance diet as tolerated, if ordered  - Consider nutrition services referral to assist patient with adequate nutrition and appropriate food choices  Outcome: Progressing  Goal: Maintains or returns to baseline bowel function  Description: INTERVENTIONS:  - Assess bowel function  - Encourage oral fluids to ensure adequate hydration  - Administer IV fluids if ordered to ensure adequate hydration  - Administer ordered medications as needed  - Encourage mobilization and activity  - Consider nutritional services referral to assist patient with adequate nutrition and appropriate food choices  Outcome: Progressing  Goal: Maintains adequate nutritional intake  Description: INTERVENTIONS:  - Monitor percentage of each meal consumed  - Identify factors contributing to decreased intake, treat as appropriate  - Assist with meals as needed  - Monitor I&O, weight, and lab values if indicated  - Obtain nutrition services referral as needed  Outcome: Progressing  Goal: Establish and maintain optimal ostomy function  Description: INTERVENTIONS:  - Assess bowel function  - Encourage oral fluids to ensure adequate hydration  - Administer IV fluids if ordered to ensure adequate hydration   - Administer ordered medications as needed  - Encourage mobilization and activity  - Nutrition services referral to assist patient with appropriate food choices  - Assess stoma site  - Consider wound care consult   Outcome: Progressing     Problem: PAIN - ADULT  Goal: Verbalizes/displays adequate comfort level or baseline comfort level  Description: Interventions:  - Encourage patient to monitor pain and request assistance  - Assess pain using appropriate pain scale  - Administer analgesics based on type and severity of pain and evaluate response  - Implement non-pharmacological measures as appropriate and evaluate response  - Consider cultural and social influences on pain and pain management  - Notify physician/advanced practitioner if interventions unsuccessful or patient reports new pain  Outcome: Progressing     Problem: INFECTION - ADULT  Goal: Absence or prevention of progression during hospitalization  Description: INTERVENTIONS:  - Assess and monitor for signs and symptoms of infection  - Monitor lab/diagnostic results  - Monitor all insertion sites, i e  indwelling lines, tubes, and drains  - Monitor endotracheal if appropriate and nasal secretions for changes in amount and color  - [place] appropriate cooling/warming therapies per order  - Administer medications as ordered  - Instruct and encourage patient and family to use good hand hygiene technique  - Identify and instruct in appropriate isolation precautions for identified infection/condition  Outcome: Progressing  Goal: Absence of fever/infection during neutropenic period  Description: INTERVENTIONS:  - Monitor WBC    Outcome: Progressing     Problem: SAFETY ADULT  Goal: Patient will remain free of falls  Description: INTERVENTIONS:  - Assess patient frequently for physical needs  -  Identify cognitive and physical deficits and behaviors that affect risk of falls    -  [place] fall precautions as indicated by assessment   - Educate patient/family on patient safety including physical limitations  - Instruct patient to call for assistance with activity based on assessment  - Modify environment to reduce risk of injury  - Consider OT/PT consult to assist with strengthening/mobility  Outcome: Progressing  Goal: Maintain or return to baseline ADL function  Description: INTERVENTIONS:  -  Assess patient's ability to carry out ADLs; assess patient's baseline for ADL function and identify physical deficits which impact ability to perform ADLs (bathing, care of mouth/teeth, toileting, grooming, dressing, etc )  - Assess/evaluate cause of self-care deficits   - Assess range of motion  - Assess patient's mobility; develop plan if impaired  - Assess patient's need for assistive devices and provide as appropriate  - Encourage maximum independence but intervene and supervise when necessary  - Involve family in performance of ADLs  - Assess for home care needs following discharge   - Consider OT consult to assist with ADL evaluation and planning for discharge  - Provide patient education as appropriate  Outcome: Progressing  Goal: Maintain or return mobility status to optimal level  Description: INTERVENTIONS:  - Assess patient's baseline mobility status (ambulation, transfers, stairs, etc )    - Identify cognitive and physical deficits and behaviors that affect mobility  - Identify mobility aids required to assist with transfers and/or ambulation (gait belt, sit-to-stand, lift, walker, cane, etc )  - [place] fall precautions as indicated by assessment  - Record patient progress and toleration of activity level on Mobility SBAR; progress patient to next Phase/Stage  - Instruct patient to call for assistance with activity based on assessment  - Consider rehabilitation consult to assist with strengthening/weightbearing, etc   Outcome: Progressing

## 2021-02-19 NOTE — PROGRESS NOTES
Progress Note - General Surgery   Jeimy Paget 70 y o  female MRN: [id number]  Unit/Bed#: -01 Encounter: [id number]    Assessment/Plan:  Perforated sigmoid colon diverticulitis  -POD#1 s/p  Mery's procedure with end colostomy  -patient with incisional pain and mild nausea overnight, leukocytosis slightly improved  -continue IVFs, IV Abx  -okay for clear liquids as tolerated as nausea has resolved  -d/c champion, good UO  - continue pain control, will adjust medications  - encourage out of bed, ambulation, incentive spirometer  - DVT and GI prophylaxis  -will consult wound care for ostomy teaching  -monitor bowel function, ostomy output  -discussed follow-up plan for colonoscopy and  Colostomy reversal in approximately 12 weeks with Dr Chaim Barbosa    Hypokalemia  -replace and monitor    HTN, HLD  -restart BP meds  -monitor BP        Subjective/Objective   Chief Complaint: abdominal pain    Subjective:  Complains of incisional pain  Mild nausea overnight  Improved this morning  Not OOB  Champion in place with good UO  No fevers or chills    Objective:     Blood pressure 129/61, pulse 78, temperature 98 3 °F (36 8 °C), temperature source Oral, resp  rate 16, height 5' (1 524 m), weight 68 kg (150 lb), SpO2 99 %, not currently breastfeeding  ,Body mass index is 29 29 kg/m²        Intake/Output Summary (Last 24 hours) at 2/19/2021 1143  Last data filed at 2/19/2021 0801  Gross per 24 hour   Intake 3045 83 ml   Output 1040 ml   Net 2005 83 ml       Invasive Devices     Peripheral Intravenous Line            Peripheral IV 02/18/21 Antecubital less than 1 day    Peripheral IV 02/18/21 Right Antecubital less than 1 day          Drain            Closed/Suction Drain Right RLQ Bulb 15 Fr  less than 1 day    Colostomy LLQ less than 1 day    NG/OG/Enteral Tube Orogastric 18 Fr Center mouth less than 1 day    Urethral Catheter Latex 16 Fr  less than 1 day                Physical Exam:   General appearance: alert and oriented, in no acute distress  Head: Normocephalic, without obvious abnormality, atraumatic, sclerae anicteric, mucous membranes moist  Neck: no JVD and supple, symmetrical, trachea midline  Lungs: clear to auscultation, no wheezes or rales  Heart:   Regular rate and rhythm, S1-S2 normal, no murmur  Abdomen:   Soft,  Non distended, tender over incision sites, hypoactive bowel sounds, ostomy viable without output  Extremities:   No edema, redness or tenderness in the calves or thighs  Skin: Warm, dry; incision sites clean, dry intact  Nursing notes and vital signs reviewed      Lab, Imaging and other studies:  I have personally reviewed pertinent lab results    , CBC:   Lab Results   Component Value Date    WBC 14 85 (H) 02/19/2021    HGB 12 0 02/19/2021    HCT 35 8 02/19/2021    MCV 90 02/19/2021     02/19/2021    MCH 30 2 02/19/2021    MCHC 33 5 02/19/2021    RDW 12 9 02/19/2021    MPV 10 2 02/19/2021    NRBC 0 02/18/2021   , CMP:   Lab Results   Component Value Date    SODIUM 137 02/19/2021    K 3 3 (L) 02/19/2021     02/19/2021    CO2 29 02/19/2021    BUN 15 02/19/2021    CREATININE 0 74 02/19/2021    CALCIUM 8 1 (L) 02/19/2021    AST 21 02/18/2021    ALT 25 02/18/2021    ALKPHOS 77 02/18/2021    EGFR 81 02/19/2021     VTE Pharmacologic Prophylaxis: Heparin  VTE Mechanical Prophylaxis: sequential compression device     Sven Mario PA-C

## 2021-02-19 NOTE — OP NOTE
OPERATIVE REPORT  PATIENT NAME: Delia Silva    :  1945  MRN: [id number]  Pt Location: [facility] OR ROOM 03    SURGERY DATE: 2021    Surgeon(s) and Role:     * Emil Butcher MD - Primary     * Jacki Rene PA-C - Assisting    Preop Diagnosis:  Perforated bowel ([address] ) [K63 1]    Post-Op Diagnosis Codes:     * Perforated bowel ([address] ) [K63 1]    Procedure(s) (LRB):  LAPAROSCOPY DIAGNOSTIC (N/A)  LAPAROTOMY EXPLORATORY (N/A)  COLOSTOMY END creation (Left)  RESECTION COLON SIGMOID (N/A)    Specimen(s):  ID Type Source Tests Collected by Time Destination   1 :  Tissue Large Intestine, Sigmoid Colon TISSUE EXAM Emil Butcher MD 2021 2105        Estimated Blood Loss:   100 mL    Drains:  Closed/Suction Drain Right RLQ Bulb 15 Fr  (Active)   Site Description Unable to view 21 0501   Dressing Status Clean;Dry; Intact 21 0501   Drainage Appearance Bloody 21 0501   Status To bulb suction 21 0501   Output (mL) 30 mL 21 0501   Number of days: 1       NG/OG/Enteral Tube Orogastric 18 Fr Center mouth (Active)   Number of days: 1       Colostomy LLQ (Active)   Stomal Appliance 2 piece;Clean;Dry; Intact 21 0501   Stoma Assessment Red; Other (Comment) 21 0501   Stoma size (in) 1 5 in 21 2258   Stoma Shape Oval 21 0501   Peristomal Assessment Intact; Clean 21 0501   Number of days: 1       Urethral Catheter Latex 16 Fr   (Active)   Site Assessment Clean;Skin intact 21 0801   Collection Container Standard drainage bag 21 0801   Securement Method Securing device (Describe) 21 0801   Output (mL) 400 mL 21 0801   Number of days: 1       Anesthesia Type:   General    Operative Indications:  Perforated bowel ([address] ) [K63 1]      Operative Findings:  Purulence pelvic fluid, inflamed sigmoid colon with sigmoid diverticulitis    Complications:   None    Procedure and Technique:  Patient was identified in the preoperative holding area and taken back to the operating room she was positioned supine on the operating table  General anesthesia was induced  [place] catheter was sterilely placed  She was then prepped and draped in standard sterile surgical fashion  A preoperative time-out was conducted confirming the correct patient, procedure and that all necessary equipment and personnel were functioning present within the operating room  An incision was made at the umbilicus with a scalpel and a Veress needle was inserted into the abdomen  The abdomen was insufflated to 15 mmHg  Veress needle was then removed and a 5 mm port placed the umbilicus  Laparoscopic was inserted into the port intra-abdominal contents were inspected  The son has some purulent fluid in the pelvis and some reactive hyperemic bowel in the right and left lower quadrant  Two 5 mm ports then placed in the right hemiabdomen under direct visualization  Atraumatic graspers were used to bluntly manipulate the bowel in the left lower quadrant and an inflamed sigmoid colon was identified  This was significantly thickened and there were areas of fibrinous exudate over the bowel  There was no stool or feculent fluid in the pelvis  The sigmoid colon was adhesed to the pelvic sidewall and had very limited mobility  Harmonic scalpel was used to take down adhesions to the pelvic sidewall on along the lateral peritoneal reflection to mobilize the sigmoid colon  We attempted to elevate the colon out of the pelvis with mesentery was very thickened and shortened secondary to inflammation  At This point the laparoscoped was removed and ports were removed  A lower vertical midline incision was created with a scalpel  Electrocautery was used dissect down to the subcutaneous tissue to the fascia was identified  Fascia was divided in the midline with electrocautery  Peritoneum was grasped and entered bluntly    The peritoneal incision was then extended to the length of the skin incision was electrocautery  Care was taken to protect the underlying viscera  Small bowel was then packed away using a damp towel in the abdomen to allow for better visualization  Sigmoid colon was significantly thickened and immobile within the pelvis  The lateral peritoneal reflection was opened inferiorly and superiorly and sigmoid colon mobilized medially  An area of the descending colon was identified that was soft without significant disease  A window was made mesentery and a contour stapler with a blue load was used to tighten dissect the proximal aspect of the specimen  Harmonic scalpel was then used to dissect to the mesentery of the sigmoid colon  Mesenteric vessels were clamped and tied as appropriate for hemostasis  No area and the proximal rectum was identified which was soft below the area of the perforation  We continued our mesenteric dissection close to the bowel just this area was freed and a contour stapler with a blue load we used to transect the specimens distal margin  Specimen was then passed off the field for pathology  Staple lines were both intact without bleeding  The rectal stump margin was marked with a 2-0 Prolene suture for future reanastomosis  We then turned our attention to the descending colon and began to mobilize the bowel for creation of a descending end colostomy bilateral peritoneal attachments were taken down and mesentery was mobilized until the bowel would reach the anterior abdominal wall without significant tension  Warm irrigation was used for abdominal washout and the staple line and mesenteric resection area was re-examined was good hemostasis  A OLIVA drain was then inserted through 1 of the 5 mm port sites and placed within the pelvis  Then selected an area on the left hemiabdomen through the rectus and excised an ellipse of skin  We dissected down through subcutaneous tissue to the fascia and incised the fascia with electrocautery    Then divided the peritoneum and dilated the skin defect to comfortably fit 3 fingers  The descending colon was then passed through the abdominal wall defect for creation of the end colostomy  I then re-examined the abdomen to ensure that the small bowel was in good anatomic position and there was no twisting of the descending colon  The fascia of the midline incision was then closed with a running 1  PDS suture x2, secured in the midline  The subcutaneous tissue was then irrigated thoroughly and the skin was closed with staples  The descending colon staple line was then excised and the end colostomy matured with 3-0 chromic suture  The stoma was edematous but patent and the stoma was digitized to ensure that there were fascial defect was not too tight around the stoma  The stoma appliance was then applied and Mepilex dressing applied to the midline incision  Patient was then woken from general anesthesia and taken to the postoperative care unit in good condition  All counts were correct at the end of the case     I was present for the entire procedure, A qualified resident physician was not available and A physician assistant was required during the procedure for retraction tissue handling,dissection and suturing    Patient Disposition:  PACU  and hemodynamically stable    SIGNATURE: Amy Serra MD  DATE: February 19, 2021  TIME: 12:57 PM

## 2021-02-19 NOTE — UTILIZATION REVIEW
Initial Clinical Review    Admission: Date/Time/Statement:   Admission Orders (From admission, onward)     Ordered        02/18/21 1831  INPATIENT ADMISSION  Once                   Orders Placed This Encounter   Procedures    INPATIENT ADMISSION     Standing Status:   Standing     Number of Occurrences:   1     Order Specific Question:   Level of Care     Answer:   Med Surg [16]     Order Specific Question:   Estimated length of stay     Answer:   More than 2 Midnights     Order Specific Question:   Certification     Answer:   I certify that inpatient services are medically necessary for this patient for a duration of greater than two midnights  See H&P and MD Progress Notes for additional information about the patient's course of treatment  ED Arrival Information     Expected Arrival Acuity Means of Arrival Escorted By Service Admission Type    - 2/18/2021 14:03 Urgent [facility] Urgent    Arrival Complaint    abd pain        Chief Complaint   Patient presents with    Abdominal Pain     lower abd pain started this morning  Patient states that she has had no urinary complaints , has had BM's  Noted chills no fever  Assessment/Plan: 65 yo fem w/hx diverticulitis, hemorrhoids, abd hernia, htn, lyme dz to ED from home admitted as inpatient on the surgical service due to pneumoperitoneum  Presented with 1 day severe abd pain  Initially had large bm after dinner the night pta, causing soreenss LLQ, went to sleep and awoke with severe worsened lower abd pain  Exam reveals abd tenderness/guarding/rebound  CT shows free air w/o specific source  Taking to OR for diagnostic laparoscopy, possible laparotomy, possible bowel resection/ostomy  IV antbx, IVF, IV analgesics/antiemetics in progress  2/18 to OR @1842  Official OP report is not yet available  Diagnostic laparoscopy was converted to Mery's procedure/ exploratory laparotomy with sigmoid colon resection and L colostomy creation  2/19: POD#1 s/p ryan's procedure; continue IVF, analgesics, IV antbx, dc champion, has good urine output, hypoactive bowel sounds, no ostomy output yet  Mild nausea overnight, which resolved  C/o incisional pain  Has R OLIVA drain (95ml out so far) and NG in place  Wbc improved slightly  Consulting wound care for ostomy teaching  Hypokalemic-replacing  ok to start clear liquids  ED Triage Vitals [02/18/21 1417]   Temperature Pulse Respirations Blood Pressure SpO2   98 6 °F (37 °C) 83 18 (!) 180/78 98 %      Temp Source Heart Rate Source Patient Position - Orthostatic VS BP Location FiO2 (%)   Tympanic Monitor Sitting Right arm --      Pain Score       7          Wt Readings from Last 1 Encounters:   02/19/21 68 kg (150 lb)     Additional Vital Signs:   Date/Time  Temp  Pulse  Resp  BP  MAP (mmHg)  SpO2   Nasal Cannula O2 Flow Rate (L/min)  O2 Device    02/19/21 0713  98 3 °F (36 8 °C)  78  16  129/61  88  99 %   1 L/min  Nasal cannula    02/19/21 0500  98 1 °F (36 7 °C)  71  12  118/58  83  --   --  --    02/19/21 0100  97 6 °F (36 4 °C)  75  12  123/58  84  --   --  --    02/19/21 0005  97 9 °F (36 6 °C)  66  16  118/57  79  --   2 L/min  Nasal cannula    02/18/21 2338  99 °F (37 2 °C)  66  16  137/62  --  93 %   2 L/min  Nasal cannula    02/18/21 2328  --  70  16  149/67  --  98 %   2 L/min  Nasal cannula    02/18/21 2313  --  72  15  146/67  --  99 %   2 L/min  Nasal cannula    02/18/21 2258  91 4 °F (33 °C)Abnormal   77  18  168/72  --  100 %   2 L/min  Nasal cannula    02/18/21 1831  99 9 °F (37 7 °C)  83  18  168/72  --  99 %   --  None (Room air)        Pertinent Labs/Diagnostic Test Results:     2/18 CT a&p: 1  Free air consistent with perforated viscus though a definitive site of perforation is not identified  Most likely source is the lower GI tract, likely secondary to diverticulitis   While there is slight stranding at the splenic flexure, there is no   evidence of perforation at this site and perforation may be at the sigmoid colon with more severe diverticulosis and adjacent locules of gas    2   Probable reactive small bowel ileus related to peritonitis    3   Foci of high density in the distal ileum of uncertain etiology  This likely represents ingested material though foci of active bleeding cannot be entirely excluded       2/18 EKG nsr    Results from last 7 days   Lab Units 02/19/21  0532 02/18/21  1444   WBC Thousand/uL 14 85* 16 78*   HEMOGLOBIN g/dL 12 0 13 5   HEMATOCRIT % 35 8 39 8   PLATELETS Thousands/uL 210 229   BANDS PCT %  --  14*         Results from last 7 days   Lab Units 02/19/21  0532 02/18/21  1444   SODIUM mmol/L 137 136   POTASSIUM mmol/L 3 3* 3 0*   CHLORIDE mmol/L 102 99*   CO2 mmol/L 29 29   ANION GAP mmol/L 6 8   BUN mg/dL 15 20   CREATININE mg/dL 0 74 0 90   EGFR ml/min/1 73sq m 81 64   CALCIUM mg/dL 8 1* 9 1   MAGNESIUM mg/dL  --  1 8     Results from last 7 days   Lab Units 02/18/21  1444   AST U/L 21   ALT U/L 25   ALK PHOS U/L 77   TOTAL PROTEIN g/dL 7 3   ALBUMIN g/dL 3 8   TOTAL BILIRUBIN mg/dL 1 30*         Results from last 7 days   Lab Units 02/19/21  0532 02/18/21  1444   GLUCOSE RANDOM mg/dL 129 103         Results from last 7 days   Lab Units 02/18/21  1444   TROPONIN I ng/mL <0 02         Results from last 7 days   Lab Units 02/18/21  1642   PROTIME seconds 14 4   INR  1 12   PTT seconds 29     Results from last 7 days   Lab Units 02/18/21  1642   LACTIC ACID mmol/L 1 8     Results from last 7 days   Lab Units 02/18/21  1444   LIPASE u/L 104     Results from last 7 days   Lab Units 02/18/21  1548   CLARITY UA  Clear   COLOR UA  Straw   SPEC GRAV UA  1 010   PH UA  6 0   GLUCOSE UA mg/dl Negative   KETONES UA mg/dl Negative   BLOOD UA  Negative   PROTEIN UA mg/dl Negative   NITRITE UA  Negative   BILIRUBIN UA  Negative   UROBILINOGEN UA E U /dl 0 2   LEUKOCYTES UA  Small*   WBC UA /hpf 1-2   RBC UA /hpf None Seen   BACTERIA UA /hpf None Seen   EPITHELIAL CELLS WET PREP /hpf Occasional       Results from last 7 days   Lab Units 02/18/21  1642   BLOOD CULTURE  Received in Microbiology Lab  Culture in Progress  Received in Microbiology Lab  Culture in Progress       Results from last 7 days   Lab Units 02/18/21  1444   TOTAL COUNTED  100           ED Treatment:   Medication Administration from 02/18/2021 1403 to 02/18/2021 1830       Date/Time Order Dose Route Action     02/18/2021 1450 sodium chloride 0 9 % bolus 500 mL 500 mL Intravenous New Bag     02/18/2021 1631 potassium chloride 20 mEq IVPB (premix) 20 mEq Intravenous New Bag     02/18/2021 1552 morphine (PF) 4 mg/mL injection 4 mg 4 mg Intravenous Given     02/18/2021 1554 ondansetron (ZOFRAN) injection 4 mg 4 mg Intravenous Given     02/18/2021 1624 iohexol (OMNIPAQUE) 350 MG/ML injection (MULTI-DOSE) 100 mL 100 mL Intravenous Given     02/18/2021 1631 sodium chloride 0 9 % bolus 500 mL 500 mL Intravenous New Bag     02/18/2021 1658 piperacillin-tazobactam (ZOSYN) IVPB 3 375 g 3 375 g Intravenous New Bag        Past Medical History:   Diagnosis Date    Allergic rhinitis     DDD (degenerative disc disease), lumbar     Diverticulitis large intestine w/o perforation or abscess w/o bleeding     Hemorrhoids     Hernia of abdominal cavity 03/18/2020    History of gallstones     Hyperlipidemia     Hypertension     Lyme disease 1988     Present on Admission:   Diverticulitis large intestine w/o perforation or abscess w/o bleeding      Admitting Diagnosis: Abdominal pain [R10 9]  Perforated bowel ([facility] ) [K63 1]  Sepsis ([facility] ) [A41 9]  Age/Sex: 70 y o  female  Admission Orders:  Scheduled Medications:  atenolol, 100 mg, Oral, Daily    And  chlorthalidone, 25 mg, Oral, Daily  cefTRIAXone, 1,000 mg, Intravenous, Q24H  heparin (porcine), 5,000 Units, Subcutaneous, Q8H ROBE  HYDROmorphone, 0 6 mg, Intravenous, Once  metroNIDAZOLE, 500 mg, Intravenous, Q8H  potassium chloride, 30 mEq, Intravenous, Once      Continuous IV Infusions:  lactated ringers, 125 mL/hr, Intravenous, Continuous      PRN Meds:  acetaminophen, 650 mg, Oral, Q6H PRN  HYDROmorphone, 0 2 mg, Intravenous, Q3H PRN  HYDROmorphone, 0 5 mg, Intravenous, Q3H PRN x 1 2/18 @2326, x 2 2/19 @0143, 0746  ondansetron, 4 mg, Intravenous, Q4H PRN x 1 2/19 @0750      SCD's  Clear liquids      Network Utilization Review Department  ATTENTION: Please call with any questions or concerns to [phone] and carefully listen to the prompts so that you are directed to the right person  All voicemails are confidential   Drea Bunch all requests for admission clinical reviews, approved or denied determinations and any other requests to dedicated fax number below belonging to the campus where the patient is receiving treatment   List of dedicated fax numbers for the Facilities:  [address] DENIALS (Administrative/Medical Necessity) [phone]   [address] (Maternity/NICU/Pediatrics) [phone]   [address] [place] [address] Dr Rivera Fernandez 2573 (  Lashell Patel "Sasha" 103) [address] [address] [address] [place]) [address] [place] [address] [phone]

## 2021-02-19 NOTE — PROGRESS NOTES
Pastoral Care Progress Note    2021  Patient: Ivy Goncalves : 1945  Admission Date & Time: 2021 1414  MRN: [id number] [facility]: [id number]                     Chaplaincy Interventions Utilized:   Empowerment: Clarified, confirmed, or reviewed information from treatment team     Exploration: Explored spiritual needs & resources      Relationship Building: Cultivated a relationship of care and support    Ritual: Provided prayer    Patient requested prayer for comfort and healing        Chaplaincy Outcomes Achieved:  Expressed gratitude    Patient appreciative of the prayer offered to her      Spiritual Coping Strategies Utilized:   Connectedness, Spiritual comfort and Spiritual gratitude    Patient appeared to be comforted by the prayer offered at bedside       21 1100   Clinical Encounter Type   Visited With Patient   Routine Visit Introduction   Continue Visiting Yes   Surgical Visit Post-op   Adventism Encounters   Adventism Needs Prayer   Patient Spiritual Encounters   Feelings of Hopelessness none   Coping 5   Social Interaction 100% of the time

## 2021-02-19 NOTE — CASE MANAGEMENT
LOS: 1 day  PATIENT IS NOT A MEDICARE BUNDLE OR A 30 DAY READMISSION  UNPLANNED READMISSSION SCORE IS 10 - GREEN  Met with patient  Explained role of care management  Patient lives alone in a ranch style home with 5-10 MURIEL  She is independent adls' and ambulation, drives, provides her own meals  DME - RW, tub seat  Past services - denies history of VNA, SNF, MH or D&A  Her daughters Gaspar Nyhan and Axel Luna have POA  She has an AD  Her PCP is [facility]  Pharmacy of choice is [facility] in [place]  She has a prescription plan and is able to afford her medication  She plans on staying with her daughter Axel Luna in [facility] [place] at discharge  Address: [place] [address], [place] [address]  Her daughters will help at home as needed  She is agreeable to VNA  Given freedom of choice and list   Referral via [facility] to [facility]  Daughter Axel Luna will transport home  CM reviewed d/c planning process including the following: identifying help at home, patient preference for d/c planning needs, availability of treatment team to discuss questions or concerns patient and/or family may have regarding understanding medications and recognizing signs and symptoms once discharged  CM also encouraged patient to follow up with all recommended appointments after discharge  Patient advised of importance for patient and family to participate in managing patients medical well being

## 2021-02-19 NOTE — CASE MANAGEMENT
Received call from patients daughter Brice Cabrera [phone]  She states that the plan is for patient to come to her home in [place] at discharge and she is agreeable to [facility]  [facility], Tereso Hughes

## 2021-02-19 NOTE — PLAN OF CARE
Problem: Potential for Falls  Goal: Patient will remain free of falls  Description: INTERVENTIONS:  - Assess patient frequently for physical needs  -  Identify cognitive and physical deficits and behaviors that affect risk of falls    -  [place] fall precautions as indicated by assessment   - Educate patient/family on patient safety including physical limitations  - Instruct patient to call for assistance with activity based on assessment  - Modify environment to reduce risk of injury  - Consider OT/PT consult to assist with strengthening/mobility  Outcome: Progressing     Problem: GASTROINTESTINAL - ADULT  Goal: Minimal or absence of nausea and/or vomiting  Description: INTERVENTIONS:  - Administer IV fluids if ordered to ensure adequate hydration  - Maintain NPO status until nausea and vomiting are resolved  - Nasogastric tube if ordered  - Administer ordered antiemetic medications as needed  - Provide nonpharmacologic comfort measures as appropriate  - Advance diet as tolerated, if ordered  - Consider nutrition services referral to assist patient with adequate nutrition and appropriate food choices  Outcome: Progressing  Goal: Maintains or returns to baseline bowel function  Description: INTERVENTIONS:  - Assess bowel function  - Encourage oral fluids to ensure adequate hydration  - Administer IV fluids if ordered to ensure adequate hydration  - Administer ordered medications as needed  - Encourage mobilization and activity  - Consider nutritional services referral to assist patient with adequate nutrition and appropriate food choices  Outcome: Progressing  Goal: Maintains adequate nutritional intake  Description: INTERVENTIONS:  - Monitor percentage of each meal consumed  - Identify factors contributing to decreased intake, treat as appropriate  - Assist with meals as needed  - Monitor I&O, weight, and lab values if indicated  - Obtain nutrition services referral as needed  Outcome: Progressing  Goal: Establish and maintain optimal ostomy function  Description: INTERVENTIONS:  - Assess bowel function  - Encourage oral fluids to ensure adequate hydration  - Administer IV fluids if ordered to ensure adequate hydration   - Administer ordered medications as needed  - Encourage mobilization and activity  - Nutrition services referral to assist patient with appropriate food choices  - Assess stoma site  - Consider wound care consult   Outcome: Progressing     Problem: PAIN - ADULT  Goal: Verbalizes/displays adequate comfort level or baseline comfort level  Description: Interventions:  - Encourage patient to monitor pain and request assistance  - Assess pain using appropriate pain scale  - Administer analgesics based on type and severity of pain and evaluate response  - Implement non-pharmacological measures as appropriate and evaluate response  - Consider cultural and social influences on pain and pain management  - Notify physician/advanced practitioner if interventions unsuccessful or patient reports new pain  Outcome: Progressing     Problem: INFECTION - ADULT  Goal: Absence or prevention of progression during hospitalization  Description: INTERVENTIONS:  - Assess and monitor for signs and symptoms of infection  - Monitor lab/diagnostic results  - Monitor all insertion sites, i e  indwelling lines, tubes, and drains  - Monitor endotracheal if appropriate and nasal secretions for changes in amount and color  - [place] appropriate cooling/warming therapies per order  - Administer medications as ordered  - Instruct and encourage patient and family to use good hand hygiene technique  - Identify and instruct in appropriate isolation precautions for identified infection/condition  Outcome: Progressing  Goal: Absence of fever/infection during neutropenic period  Description: INTERVENTIONS:  - Monitor WBC    Outcome: Progressing     Problem: INFECTION - ADULT  Goal: Absence or prevention of progression during hospitalization  Description: INTERVENTIONS:  - Assess and monitor for signs and symptoms of infection  - Monitor lab/diagnostic results  - Monitor all insertion sites, i e  indwelling lines, tubes, and drains  - Monitor endotracheal if appropriate and nasal secretions for changes in amount and color  - [place] appropriate cooling/warming therapies per order  - Administer medications as ordered  - Instruct and encourage patient and family to use good hand hygiene technique  - Identify and instruct in appropriate isolation precautions for identified infection/condition  Outcome: Progressing  Goal: Absence of fever/infection during neutropenic period  Description: INTERVENTIONS:  - Monitor WBC    Outcome: Progressing     Problem: SAFETY ADULT  Goal: Patient will remain free of falls  Description: INTERVENTIONS:  - Assess patient frequently for physical needs  -  Identify cognitive and physical deficits and behaviors that affect risk of falls    -  [place] fall precautions as indicated by assessment   - Educate patient/family on patient safety including physical limitations  - Instruct patient to call for assistance with activity based on assessment  - Modify environment to reduce risk of injury  - Consider OT/PT consult to assist with strengthening/mobility  Outcome: Progressing  Goal: Maintain or return to baseline ADL function  Description: INTERVENTIONS:  -  Assess patient's ability to carry out ADLs; assess patient's baseline for ADL function and identify physical deficits which impact ability to perform ADLs (bathing, care of mouth/teeth, toileting, grooming, dressing, etc )  - Assess/evaluate cause of self-care deficits   - Assess range of motion  - Assess patient's mobility; develop plan if impaired  - Assess patient's need for assistive devices and provide as appropriate  - Encourage maximum independence but intervene and supervise when necessary  - Involve family in performance of ADLs  - Assess for home care needs following discharge   - Consider OT consult to assist with ADL evaluation and planning for discharge  - Provide patient education as appropriate  Outcome: Progressing  Goal: Maintain or return mobility status to optimal level  Description: INTERVENTIONS:  - Assess patient's baseline mobility status (ambulation, transfers, stairs, etc )    - Identify cognitive and physical deficits and behaviors that affect mobility  - Identify mobility aids required to assist with transfers and/or ambulation (gait belt, sit-to-stand, lift, walker, cane, etc )  - [place] fall precautions as indicated by assessment  - Record patient progress and toleration of activity level on Mobility SBAR; progress patient to next Phase/Stage  - Instruct patient to call for assistance with activity based on assessment  - Consider rehabilitation consult to assist with strengthening/weightbearing, etc   Outcome: Progressing

## 2021-02-19 NOTE — CASE MANAGEMENT
[facility] does not service the [facility]  Referral via [facility] to KAMI Chopra

## 2021-02-19 NOTE — ANESTHESIA POSTPROCEDURE EVALUATION
Post-Op Assessment Note    CV Status:  Stable  Pain Score: 4    Pain management: adequate     Mental Status:  Alert and awake   Hydration Status:  Euvolemic   PONV Controlled:  Controlled   Airway Patency:  Patent      Post Op Vitals Reviewed: Yes      Staff: Anesthesiologist         No complications documented      /72 (02/18/21 2258)    Temp (!) 91 4 °F (33 °C) (02/18/21 2258)    Pulse 77 (02/18/21 2258)   Resp 18 (02/18/21 2258)    SpO2 100 % (02/18/21 2258)

## 2021-02-20 LAB
ANION GAP SERPL CALCULATED.3IONS-SCNC: 7 MMOL/L (ref 4–13)
BASOPHILS # BLD AUTO: 0.03 THOUSANDS/ΜL (ref 0–0.1)
BASOPHILS NFR BLD AUTO: 0 % (ref 0–1)
BUN SERPL-MCNC: 19 MG/DL (ref 5–25)
CALCIUM SERPL-MCNC: 8.4 MG/DL (ref 8.3–10.1)
CHLORIDE SERPL-SCNC: 100 MMOL/L (ref 100–108)
CO2 SERPL-SCNC: 26 MMOL/L (ref 21–32)
CREAT SERPL-MCNC: 1.14 MG/DL (ref 0.6–1.3)
EOSINOPHIL # BLD AUTO: 0.21 THOUSAND/ΜL (ref 0–0.61)
EOSINOPHIL NFR BLD AUTO: 2 % (ref 0–6)
ERYTHROCYTE [DISTWIDTH] IN BLOOD BY AUTOMATED COUNT: 13 % (ref 11.6–15.1)
GFR SERPL CREATININE-BSD FRML MDRD: 48 ML/MIN/1.73SQ M
GLUCOSE SERPL-MCNC: 105 MG/DL (ref 65–140)
HCT VFR BLD AUTO: 30.7 % (ref 34.8–46.1)
HGB BLD-MCNC: 10 G/DL (ref 11.5–15.4)
IMM GRANULOCYTES # BLD AUTO: 0.08 THOUSAND/UL (ref 0–0.2)
IMM GRANULOCYTES NFR BLD AUTO: 1 % (ref 0–2)
LYMPHOCYTES # BLD AUTO: 0.75 THOUSANDS/ΜL (ref 0.6–4.47)
LYMPHOCYTES NFR BLD AUTO: 6 % (ref 14–44)
MAGNESIUM SERPL-MCNC: 1.7 MG/DL (ref 1.6–2.6)
MCH RBC QN AUTO: 29.7 PG (ref 26.8–34.3)
MCHC RBC AUTO-ENTMCNC: 32.6 G/DL (ref 31.4–37.4)
MCV RBC AUTO: 91 FL (ref 82–98)
MONOCYTES # BLD AUTO: 0.38 THOUSAND/ΜL (ref 0.17–1.22)
MONOCYTES NFR BLD AUTO: 3 % (ref 4–12)
NEUTROPHILS # BLD AUTO: 11.81 THOUSANDS/ΜL (ref 1.85–7.62)
NEUTS SEG NFR BLD AUTO: 88 % (ref 43–75)
NRBC BLD AUTO-RTO: 0 /100 WBCS
PHOSPHATE SERPL-MCNC: 3 MG/DL (ref 2.3–4.1)
PLATELET # BLD AUTO: 169 THOUSANDS/UL (ref 149–390)
PMV BLD AUTO: 9.7 FL (ref 8.9–12.7)
POTASSIUM SERPL-SCNC: 3.2 MMOL/L (ref 3.5–5.3)
RBC # BLD AUTO: 3.37 MILLION/UL (ref 3.81–5.12)
SODIUM SERPL-SCNC: 133 MMOL/L (ref 136–145)
WBC # BLD AUTO: 13.26 THOUSAND/UL (ref 4.31–10.16)

## 2021-02-20 PROCEDURE — 80048 BASIC METABOLIC PNL TOTAL CA: CPT | Performed by: PHYSICIAN ASSISTANT

## 2021-02-20 PROCEDURE — 83735 ASSAY OF MAGNESIUM: CPT | Performed by: PHYSICIAN ASSISTANT

## 2021-02-20 PROCEDURE — 85025 COMPLETE CBC W/AUTO DIFF WBC: CPT | Performed by: PHYSICIAN ASSISTANT

## 2021-02-20 PROCEDURE — 99024 POSTOP FOLLOW-UP VISIT: CPT | Performed by: PHYSICIAN ASSISTANT

## 2021-02-20 PROCEDURE — 84100 ASSAY OF PHOSPHORUS: CPT | Performed by: PHYSICIAN ASSISTANT

## 2021-02-20 RX ORDER — POTASSIUM CHLORIDE 20 MEQ/1
40 TABLET, EXTENDED RELEASE ORAL ONCE
Status: COMPLETED | OUTPATIENT
Start: 2021-02-20 | End: 2021-02-20

## 2021-02-20 RX ORDER — GUAIFENESIN 600 MG
600 TABLET, EXTENDED RELEASE 12 HR ORAL EVERY 12 HOURS SCHEDULED
Status: DISCONTINUED | OUTPATIENT
Start: 2021-02-20 | End: 2021-02-23 | Stop reason: HOSPADM

## 2021-02-20 RX ORDER — SACCHAROMYCES BOULARDII 250 MG
250 CAPSULE ORAL 2 TIMES DAILY
Status: DISCONTINUED | OUTPATIENT
Start: 2021-02-20 | End: 2021-02-23 | Stop reason: HOSPADM

## 2021-02-20 RX ADMIN — ACETAMINOPHEN 975 MG: 325 TABLET, FILM COATED ORAL at 05:58

## 2021-02-20 RX ADMIN — POTASSIUM CHLORIDE 40 MEQ: 1500 TABLET, EXTENDED RELEASE ORAL at 11:02

## 2021-02-20 RX ADMIN — OXYCODONE HYDROCHLORIDE 5 MG: 5 TABLET ORAL at 03:55

## 2021-02-20 RX ADMIN — ACETAMINOPHEN 975 MG: 325 TABLET, FILM COATED ORAL at 23:30

## 2021-02-20 RX ADMIN — ACETAMINOPHEN 975 MG: 325 TABLET, FILM COATED ORAL at 12:23

## 2021-02-20 RX ADMIN — ONDANSETRON 4 MG: 2 INJECTION INTRAMUSCULAR; INTRAVENOUS at 14:20

## 2021-02-20 RX ADMIN — ACETAMINOPHEN 975 MG: 325 TABLET, FILM COATED ORAL at 18:22

## 2021-02-20 RX ADMIN — METRONIDAZOLE 500 MG: 500 INJECTION, SOLUTION INTRAVENOUS at 14:21

## 2021-02-20 RX ADMIN — GUAIFENESIN 600 MG: 600 TABLET ORAL at 22:08

## 2021-02-20 RX ADMIN — ONDANSETRON 4 MG: 2 INJECTION INTRAMUSCULAR; INTRAVENOUS at 23:39

## 2021-02-20 RX ADMIN — OXYCODONE HYDROCHLORIDE 5 MG: 5 TABLET ORAL at 19:37

## 2021-02-20 RX ADMIN — CHLORTHALIDONE 25 MG: 25 TABLET ORAL at 08:44

## 2021-02-20 RX ADMIN — CEFTRIAXONE 1000 MG: 1 INJECTION, SOLUTION INTRAVENOUS at 23:31

## 2021-02-20 RX ADMIN — OXYCODONE HYDROCHLORIDE 5 MG: 5 TABLET ORAL at 14:19

## 2021-02-20 RX ADMIN — SODIUM CHLORIDE, SODIUM LACTATE, POTASSIUM CHLORIDE, AND CALCIUM CHLORIDE 125 ML/HR: .6; .31; .03; .02 INJECTION, SOLUTION INTRAVENOUS at 02:38

## 2021-02-20 RX ADMIN — CEFTRIAXONE 1000 MG: 1 INJECTION, SOLUTION INTRAVENOUS at 00:45

## 2021-02-20 RX ADMIN — KETOROLAC TROMETHAMINE 15 MG: 30 INJECTION, SOLUTION INTRAMUSCULAR at 05:59

## 2021-02-20 RX ADMIN — HEPARIN SODIUM 5000 UNITS: 5000 INJECTION INTRAVENOUS; SUBCUTANEOUS at 05:59

## 2021-02-20 RX ADMIN — HEPARIN SODIUM 5000 UNITS: 5000 INJECTION INTRAVENOUS; SUBCUTANEOUS at 14:21

## 2021-02-20 RX ADMIN — Medication 250 MG: at 18:22

## 2021-02-20 RX ADMIN — Medication 250 MG: at 08:44

## 2021-02-20 RX ADMIN — METRONIDAZOLE 500 MG: 500 INJECTION, SOLUTION INTRAVENOUS at 22:09

## 2021-02-20 RX ADMIN — HEPARIN SODIUM 5000 UNITS: 5000 INJECTION INTRAVENOUS; SUBCUTANEOUS at 22:09

## 2021-02-20 RX ADMIN — ONDANSETRON 4 MG: 2 INJECTION INTRAMUSCULAR; INTRAVENOUS at 19:37

## 2021-02-20 RX ADMIN — ATENOLOL 100 MG: 50 TABLET ORAL at 08:44

## 2021-02-20 RX ADMIN — GUAIFENESIN 600 MG: 600 TABLET ORAL at 08:44

## 2021-02-20 RX ADMIN — METRONIDAZOLE 500 MG: 500 INJECTION, SOLUTION INTRAVENOUS at 06:04

## 2021-02-20 NOTE — NURSING NOTE
Pt awake during most hrly rounds, says she "just can't sleep"  Assisted OOB to BR to void, colostomy bag emptied, OLIVA drain emptied and drsg around OLIVA changed for serous leakage around tube  Pt states prn po med effective

## 2021-02-20 NOTE — PROGRESS NOTES
Progress Note - General Surgery   Jodie Nelson 70 y o  female MRN: [id number]  Unit/Bed#: -01 Encounter: [id number]    Assessment/Plan:  Perforated sigmoid colon diverticulitis  -POD #2 s/p Mery's procedure with end colostomy  - incisional pain, nausea, leukocytosis improving  -ostomy bag leaking into incisional dressing, cleaned and replaced ostomy bag and dressing at bedside  - tolerating clear liquids, with viable ostomy and brown soft stool output  -will advance to surgical soft diet as ignacio  - Discontinue IVFs  - continue IV abx, pain control  - continue to encourage out of bed, ambulation, incentive spirometer  -DVT and GI prophylaxis  -wound care consulted for ostomy teaching  - for discharge planning, patient plans to go to daughter's home in [place], [place] after discharge    Hypokalemia  - continue replacement and monitoring    HTN/HLD  - continue BP meds, monitor BP    Subjective/Objective   Chief Complaint: incisional pain    Subjective: Continues with incisional pain  Still having mild nausea but states it is improving  States she is getting out of bed with assistance  Urinating well  Tolerating clear liquid diet well, asking to advance diet  Using incentive spirometry bedside, states she is coughing when using it  No fevers, chills, vomiting  Patient asking for guaifenesin seen and probiotic  Objective:    Blood pressure 128/61, pulse 62, temperature 97 6 °F (36 4 °C), temperature source Oral, resp  rate 14, height 5' (1 524 m), weight 68 kg (150 lb), SpO2 96 %, not currently breastfeeding  ,Body mass index is 29 29 kg/m²  Intake/Output Summary (Last 24 hours) at 2/20/2021 0935  Last data filed at 2/20/2021 0700  Gross per 24 hour   Intake 1485 ml   Output 675 ml   Net 810 ml       Invasive Devices     Peripheral Intravenous Line            Peripheral IV 02/19/21 Distal;Right;Ventral (anterior) Forearm less than 1 day          Drain            Closed/Suction Drain Right RLQ Bulb 15 Fr  1 day    Colostomy LLQ 1 day                Physical Exam:   General appearance: alert and oriented, in no acute distress  Head: Normocephalic, without obvious abnormality, atraumatic, sclerae anicteric, mucous membranes moist  Neck: no JVD and supple, symmetrical, trachea midline  Lungs: clear to auscultation, no wheezes or rales  Heart:   Regular rate and rhythm, S1-S2 normal, no murmur  Abdomen:   Flat, soft, active bowel sounds, Tender over incision sites  Ostomy dark but viable with brown soft stool output  Extremities:   No edema, redness or tenderness in the calves or thighs  Skin: Warm, dry; incision sites clean, dry intact  Drain with blood-tinged serous output  Nursing notes and vital signs reviewed    Lab, Imaging and other studies:  I have personally reviewed pertinent lab results    , CBC:   Lab Results   Component Value Date    WBC 13 26 (H) 02/20/2021    HGB 10 0 (L) 02/20/2021    HCT 30 7 (L) 02/20/2021    MCV 91 02/20/2021     02/20/2021    MCH 29 7 02/20/2021    MCHC 32 6 02/20/2021    RDW 13 0 02/20/2021    MPV 9 7 02/20/2021    NRBC 0 02/20/2021   , CMP:   Lab Results   Component Value Date    SODIUM 133 (L) 02/20/2021    K 3 2 (L) 02/20/2021     02/20/2021    CO2 26 02/20/2021    BUN 19 02/20/2021    CREATININE 1 14 02/20/2021    CALCIUM 8 4 02/20/2021    EGFR 48 02/20/2021     VTE Pharmacologic Prophylaxis: Heparin  VTE Mechanical Prophylaxis: sequential compression device     Rc Mario PA-C

## 2021-02-21 ENCOUNTER — APPOINTMENT (INPATIENT)
Dept: RADIOLOGY | Facility: HOSPITAL | Age: 76
DRG: 329 | End: 2021-02-21
Payer: COMMERCIAL

## 2021-02-21 LAB
ANION GAP SERPL CALCULATED.3IONS-SCNC: 8 MMOL/L (ref 4–13)
BASOPHILS # BLD AUTO: 0.03 THOUSANDS/ΜL (ref 0–0.1)
BASOPHILS NFR BLD AUTO: 0 % (ref 0–1)
BUN SERPL-MCNC: 27 MG/DL (ref 5–25)
CALCIUM SERPL-MCNC: 8.8 MG/DL (ref 8.3–10.1)
CHLORIDE SERPL-SCNC: 100 MMOL/L (ref 100–108)
CO2 SERPL-SCNC: 24 MMOL/L (ref 21–32)
CREAT SERPL-MCNC: 1.09 MG/DL (ref 0.6–1.3)
EOSINOPHIL # BLD AUTO: 0.21 THOUSAND/ΜL (ref 0–0.61)
EOSINOPHIL NFR BLD AUTO: 1 % (ref 0–6)
ERYTHROCYTE [DISTWIDTH] IN BLOOD BY AUTOMATED COUNT: 13 % (ref 11.6–15.1)
GFR SERPL CREATININE-BSD FRML MDRD: 50 ML/MIN/1.73SQ M
GLUCOSE SERPL-MCNC: 107 MG/DL (ref 65–140)
HCT VFR BLD AUTO: 33.3 % (ref 34.8–46.1)
HGB BLD-MCNC: 11 G/DL (ref 11.5–15.4)
IMM GRANULOCYTES # BLD AUTO: 0.14 THOUSAND/UL (ref 0–0.2)
IMM GRANULOCYTES NFR BLD AUTO: 1 % (ref 0–2)
LYMPHOCYTES # BLD AUTO: 1.24 THOUSANDS/ΜL (ref 0.6–4.47)
LYMPHOCYTES NFR BLD AUTO: 8 % (ref 14–44)
MCH RBC QN AUTO: 29.9 PG (ref 26.8–34.3)
MCHC RBC AUTO-ENTMCNC: 33 G/DL (ref 31.4–37.4)
MCV RBC AUTO: 91 FL (ref 82–98)
MONOCYTES # BLD AUTO: 0.49 THOUSAND/ΜL (ref 0.17–1.22)
MONOCYTES NFR BLD AUTO: 3 % (ref 4–12)
NEUTROPHILS # BLD AUTO: 13.67 THOUSANDS/ΜL (ref 1.85–7.62)
NEUTS SEG NFR BLD AUTO: 87 % (ref 43–75)
NRBC BLD AUTO-RTO: 0 /100 WBCS
PLATELET # BLD AUTO: 225 THOUSANDS/UL (ref 149–390)
PMV BLD AUTO: 9.9 FL (ref 8.9–12.7)
POTASSIUM SERPL-SCNC: 3.6 MMOL/L (ref 3.5–5.3)
RBC # BLD AUTO: 3.68 MILLION/UL (ref 3.81–5.12)
SODIUM SERPL-SCNC: 132 MMOL/L (ref 136–145)
WBC # BLD AUTO: 15.78 THOUSAND/UL (ref 4.31–10.16)

## 2021-02-21 PROCEDURE — 99024 POSTOP FOLLOW-UP VISIT: CPT | Performed by: SURGERY

## 2021-02-21 PROCEDURE — 74018 RADEX ABDOMEN 1 VIEW: CPT

## 2021-02-21 PROCEDURE — 71045 X-RAY EXAM CHEST 1 VIEW: CPT

## 2021-02-21 PROCEDURE — 80048 BASIC METABOLIC PNL TOTAL CA: CPT | Performed by: PHYSICIAN ASSISTANT

## 2021-02-21 PROCEDURE — C9113 INJ PANTOPRAZOLE SODIUM, VIA: HCPCS | Performed by: PHYSICIAN ASSISTANT

## 2021-02-21 PROCEDURE — 85025 COMPLETE CBC W/AUTO DIFF WBC: CPT | Performed by: PHYSICIAN ASSISTANT

## 2021-02-21 RX ORDER — ACETAMINOPHEN 325 MG/1
650 TABLET ORAL EVERY 6 HOURS PRN
Qty: 60 TABLET | Refills: 0
Start: 2021-02-21 | End: 2021-02-23 | Stop reason: HOSPADM

## 2021-02-21 RX ORDER — PANTOPRAZOLE SODIUM 40 MG/1
40 INJECTION, POWDER, FOR SOLUTION INTRAVENOUS DAILY
Status: DISCONTINUED | OUTPATIENT
Start: 2021-02-21 | End: 2021-02-23 | Stop reason: HOSPADM

## 2021-02-21 RX ORDER — PROMETHAZINE HYDROCHLORIDE 25 MG/ML
12.5 INJECTION, SOLUTION INTRAMUSCULAR; INTRAVENOUS EVERY 6 HOURS PRN
Status: DISCONTINUED | OUTPATIENT
Start: 2021-02-21 | End: 2021-02-23 | Stop reason: HOSPADM

## 2021-02-21 RX ORDER — SODIUM CHLORIDE 9 MG/ML
100 INJECTION, SOLUTION INTRAVENOUS CONTINUOUS
Status: DISCONTINUED | OUTPATIENT
Start: 2021-02-21 | End: 2021-02-22

## 2021-02-21 RX ADMIN — ACETAMINOPHEN 975 MG: 325 TABLET, FILM COATED ORAL at 11:47

## 2021-02-21 RX ADMIN — METRONIDAZOLE 500 MG: 500 INJECTION, SOLUTION INTRAVENOUS at 22:16

## 2021-02-21 RX ADMIN — METRONIDAZOLE 500 MG: 500 INJECTION, SOLUTION INTRAVENOUS at 14:24

## 2021-02-21 RX ADMIN — GUAIFENESIN 600 MG: 600 TABLET ORAL at 09:32

## 2021-02-21 RX ADMIN — OXYCODONE HYDROCHLORIDE 5 MG: 5 TABLET ORAL at 14:24

## 2021-02-21 RX ADMIN — CEFTRIAXONE 1000 MG: 1 INJECTION, SOLUTION INTRAVENOUS at 23:53

## 2021-02-21 RX ADMIN — HEPARIN SODIUM 5000 UNITS: 5000 INJECTION INTRAVENOUS; SUBCUTANEOUS at 21:59

## 2021-02-21 RX ADMIN — METRONIDAZOLE 500 MG: 500 INJECTION, SOLUTION INTRAVENOUS at 06:02

## 2021-02-21 RX ADMIN — CHLORTHALIDONE 25 MG: 25 TABLET ORAL at 09:32

## 2021-02-21 RX ADMIN — ACETAMINOPHEN 975 MG: 325 TABLET, FILM COATED ORAL at 06:02

## 2021-02-21 RX ADMIN — ACETAMINOPHEN 975 MG: 325 TABLET, FILM COATED ORAL at 18:37

## 2021-02-21 RX ADMIN — PANTOPRAZOLE SODIUM 40 MG: 40 INJECTION, POWDER, FOR SOLUTION INTRAVENOUS at 09:37

## 2021-02-21 RX ADMIN — HEPARIN SODIUM 5000 UNITS: 5000 INJECTION INTRAVENOUS; SUBCUTANEOUS at 14:24

## 2021-02-21 RX ADMIN — Medication 250 MG: at 09:32

## 2021-02-21 RX ADMIN — ACETAMINOPHEN 975 MG: 325 TABLET, FILM COATED ORAL at 23:52

## 2021-02-21 RX ADMIN — OXYCODONE HYDROCHLORIDE 5 MG: 5 TABLET ORAL at 18:37

## 2021-02-21 RX ADMIN — GUAIFENESIN 600 MG: 600 TABLET ORAL at 21:58

## 2021-02-21 RX ADMIN — ATENOLOL 100 MG: 50 TABLET ORAL at 09:32

## 2021-02-21 RX ADMIN — SODIUM CHLORIDE 100 ML/HR: 0.9 INJECTION, SOLUTION INTRAVENOUS at 09:36

## 2021-02-21 RX ADMIN — OXYCODONE HYDROCHLORIDE 5 MG: 5 TABLET ORAL at 22:43

## 2021-02-21 RX ADMIN — PROMETHAZINE HYDROCHLORIDE 12.5 MG: 25 INJECTION INTRAMUSCULAR; INTRAVENOUS at 01:01

## 2021-02-21 RX ADMIN — Medication 250 MG: at 18:37

## 2021-02-21 RX ADMIN — SODIUM CHLORIDE 100 ML/HR: 0.9 INJECTION, SOLUTION INTRAVENOUS at 21:59

## 2021-02-21 RX ADMIN — HEPARIN SODIUM 5000 UNITS: 5000 INJECTION INTRAVENOUS; SUBCUTANEOUS at 06:03

## 2021-02-21 NOTE — PROGRESS NOTES
Progress Note - General Surgery   Adiel Lawson 70 y o  female MRN: [id number]  Unit/Bed#: -01 Encounter: [id number]    Assessment/Plan:  Perforated sigmoid colon diverticulitis:  -POD #3 s/p Mery's procedure with end colostomy  -patient and with episode of vomiting last evening, NG tube placed with minimal output overnight, continues with mild nausea and good ostomy output  -KUB without significant bowel dilatation or signs of ileus,  will clamp NG tube and possible DC later today, okay for clears this AM  -otherwise pain improving, urinating well  -leukocytosis increased 13 26 <15 78, continue to monitor, if remains elevated will consider CT scan tomorrow  -restart IV fluids, continue IV antibiotics  -continue pain control and antiemetics  -ostomy and dressing replaced for stool leakage, ostomy nurse consult placed  -continue to encourage OOB, ambulation, incentive spirometer  -mild cough and sputum production, CXR ordered with atelectasis  - DVT and GI prophylaxis    Hypokalemia  -improved today, continue to monitor electrolytes    HTN/HLD:  -continue home medications  -monitor BP    Subjective/Objective   Chief Complaint: nausea/vomiting    Subjective: Pt with episode of vomiting overnight, continued nausea this morning  States she continues with productive cough  States she has been out of bed for urination  Admits to using incentive spirometer  Denies fever, chills, CP  Objective:    Blood pressure 162/72, pulse 81, temperature 98 1 °F (36 7 °C), temperature source Oral, resp  rate 17, height 5' (1 524 m), weight 68 kg (150 lb), SpO2 96 %, not currently breastfeeding  ,Body mass index is 29 29 kg/m²        Intake/Output Summary (Last 24 hours) at 2/21/2021 0929  Last data filed at 2/21/2021 0029  Gross per 24 hour   Intake 240 ml   Output 590 ml   Net -350 ml       Invasive Devices     Peripheral Intravenous Line            Peripheral IV 02/19/21 Distal;Right;Ventral (anterior) Forearm 1 day Drain            Closed/Suction Drain Right RLQ Bulb 15 Fr  2 days    Colostomy LLQ 2 days    NG/OG/Enteral Tube Nasogastric 12 Fr Left nares less than 1 day                Physical Exam:   General appearance: alert and oriented, in no acute distress  Head: Normocephalic, without obvious abnormality, atraumatic, sclerae anicteric, mucous membranes moist  Neck: no JVD and supple, symmetrical, trachea midline  Lungs: clear to auscultation, no wheezes or rales  Heart:   Regular rate and rhythm, S1-S2 normal, no murmur  Abdomen:   Non-distendeed, no tympany, soft, active bowel sounds, tender over incisional sites  Ostomy dark but viable with brown soft stool output  Extremities:   No edema, redness or tenderness in the calves or thighs  Skin: Warm, dry; incision sites clean, dry intact  Drain with blood-tinged serous output  Nursing notes and vital signs reviewed      Lab, Imaging and other studies:  I have personally reviewed pertinent lab results    , CBC:   Lab Results   Component Value Date    WBC 15 78 (H) 02/21/2021    HGB 11 0 (L) 02/21/2021    HCT 33 3 (L) 02/21/2021    MCV 91 02/21/2021     02/21/2021    MCH 29 9 02/21/2021    MCHC 33 0 02/21/2021    RDW 13 0 02/21/2021    MPV 9 9 02/21/2021    NRBC 0 02/21/2021   , CMP:   Lab Results   Component Value Date    SODIUM 132 (L) 02/21/2021    K 3 6 02/21/2021     02/21/2021    CO2 24 02/21/2021    BUN 27 (H) 02/21/2021    CREATININE 1 09 02/21/2021    CALCIUM 8 8 02/21/2021    EGFR 50 02/21/2021     VTE Pharmacologic Prophylaxis: Heparin  VTE Mechanical Prophylaxis: sequential compression device     Nova Mario PA-C

## 2021-02-22 LAB
ANION GAP SERPL CALCULATED.3IONS-SCNC: 6 MMOL/L (ref 4–13)
BASOPHILS # BLD AUTO: 0.04 THOUSANDS/ΜL (ref 0–0.1)
BASOPHILS NFR BLD AUTO: 1 % (ref 0–1)
BUN SERPL-MCNC: 19 MG/DL (ref 5–25)
CALCIUM SERPL-MCNC: 8.3 MG/DL (ref 8.3–10.1)
CHLORIDE SERPL-SCNC: 103 MMOL/L (ref 100–108)
CO2 SERPL-SCNC: 27 MMOL/L (ref 21–32)
CREAT SERPL-MCNC: 0.85 MG/DL (ref 0.6–1.3)
EOSINOPHIL # BLD AUTO: 0.42 THOUSAND/ΜL (ref 0–0.61)
EOSINOPHIL NFR BLD AUTO: 5 % (ref 0–6)
ERYTHROCYTE [DISTWIDTH] IN BLOOD BY AUTOMATED COUNT: 13.2 % (ref 11.6–15.1)
GFR SERPL CREATININE-BSD FRML MDRD: 68 ML/MIN/1.73SQ M
GLUCOSE SERPL-MCNC: 92 MG/DL (ref 65–140)
HCT VFR BLD AUTO: 30.8 % (ref 34.8–46.1)
HGB BLD-MCNC: 10 G/DL (ref 11.5–15.4)
IMM GRANULOCYTES # BLD AUTO: 0.04 THOUSAND/UL (ref 0–0.2)
IMM GRANULOCYTES NFR BLD AUTO: 1 % (ref 0–2)
LYMPHOCYTES # BLD AUTO: 1 THOUSANDS/ΜL (ref 0.6–4.47)
LYMPHOCYTES NFR BLD AUTO: 12 % (ref 14–44)
MAGNESIUM SERPL-MCNC: 2.1 MG/DL (ref 1.6–2.6)
MCH RBC QN AUTO: 29.8 PG (ref 26.8–34.3)
MCHC RBC AUTO-ENTMCNC: 32.5 G/DL (ref 31.4–37.4)
MCV RBC AUTO: 92 FL (ref 82–98)
MONOCYTES # BLD AUTO: 0.64 THOUSAND/ΜL (ref 0.17–1.22)
MONOCYTES NFR BLD AUTO: 8 % (ref 4–12)
NEUTROPHILS # BLD AUTO: 6.41 THOUSANDS/ΜL (ref 1.85–7.62)
NEUTS SEG NFR BLD AUTO: 73 % (ref 43–75)
NRBC BLD AUTO-RTO: 0 /100 WBCS
PHOSPHATE SERPL-MCNC: 2.5 MG/DL (ref 2.3–4.1)
PLATELET # BLD AUTO: 229 THOUSANDS/UL (ref 149–390)
PMV BLD AUTO: 9.8 FL (ref 8.9–12.7)
POTASSIUM SERPL-SCNC: 3.6 MMOL/L (ref 3.5–5.3)
RBC # BLD AUTO: 3.36 MILLION/UL (ref 3.81–5.12)
SODIUM SERPL-SCNC: 136 MMOL/L (ref 136–145)
WBC # BLD AUTO: 8.55 THOUSAND/UL (ref 4.31–10.16)

## 2021-02-22 PROCEDURE — 80048 BASIC METABOLIC PNL TOTAL CA: CPT | Performed by: PHYSICIAN ASSISTANT

## 2021-02-22 PROCEDURE — 85025 COMPLETE CBC W/AUTO DIFF WBC: CPT | Performed by: PHYSICIAN ASSISTANT

## 2021-02-22 PROCEDURE — C9113 INJ PANTOPRAZOLE SODIUM, VIA: HCPCS | Performed by: PHYSICIAN ASSISTANT

## 2021-02-22 PROCEDURE — 84100 ASSAY OF PHOSPHORUS: CPT | Performed by: PHYSICIAN ASSISTANT

## 2021-02-22 PROCEDURE — 83735 ASSAY OF MAGNESIUM: CPT | Performed by: PHYSICIAN ASSISTANT

## 2021-02-22 PROCEDURE — 99024 POSTOP FOLLOW-UP VISIT: CPT | Performed by: SURGERY

## 2021-02-22 RX ORDER — ONDANSETRON 2 MG/ML
4 INJECTION INTRAMUSCULAR; INTRAVENOUS EVERY 4 HOURS PRN
Status: DISCONTINUED | OUTPATIENT
Start: 2021-02-22 | End: 2021-02-23 | Stop reason: HOSPADM

## 2021-02-22 RX ADMIN — GUAIFENESIN 600 MG: 600 TABLET ORAL at 09:54

## 2021-02-22 RX ADMIN — OXYCODONE HYDROCHLORIDE 5 MG: 5 TABLET ORAL at 19:49

## 2021-02-22 RX ADMIN — OXYCODONE HYDROCHLORIDE 5 MG: 5 TABLET ORAL at 03:39

## 2021-02-22 RX ADMIN — HEPARIN SODIUM 5000 UNITS: 5000 INJECTION INTRAVENOUS; SUBCUTANEOUS at 22:25

## 2021-02-22 RX ADMIN — CHLORTHALIDONE 25 MG: 25 TABLET ORAL at 09:54

## 2021-02-22 RX ADMIN — HEPARIN SODIUM 5000 UNITS: 5000 INJECTION INTRAVENOUS; SUBCUTANEOUS at 06:24

## 2021-02-22 RX ADMIN — OXYCODONE HYDROCHLORIDE 2.5 MG: 5 TABLET ORAL at 12:12

## 2021-02-22 RX ADMIN — ATENOLOL 100 MG: 50 TABLET ORAL at 09:53

## 2021-02-22 RX ADMIN — GUAIFENESIN 600 MG: 600 TABLET ORAL at 22:25

## 2021-02-22 RX ADMIN — METRONIDAZOLE 500 MG: 500 INJECTION, SOLUTION INTRAVENOUS at 22:26

## 2021-02-22 RX ADMIN — METRONIDAZOLE 500 MG: 500 INJECTION, SOLUTION INTRAVENOUS at 06:51

## 2021-02-22 RX ADMIN — HEPARIN SODIUM 5000 UNITS: 5000 INJECTION INTRAVENOUS; SUBCUTANEOUS at 14:50

## 2021-02-22 RX ADMIN — ACETAMINOPHEN 975 MG: 325 TABLET, FILM COATED ORAL at 17:15

## 2021-02-22 RX ADMIN — Medication 250 MG: at 09:53

## 2021-02-22 RX ADMIN — ACETAMINOPHEN 975 MG: 325 TABLET, FILM COATED ORAL at 12:11

## 2021-02-22 RX ADMIN — ACETAMINOPHEN 975 MG: 325 TABLET, FILM COATED ORAL at 06:24

## 2021-02-22 RX ADMIN — METRONIDAZOLE 500 MG: 500 INJECTION, SOLUTION INTRAVENOUS at 14:51

## 2021-02-22 RX ADMIN — PANTOPRAZOLE SODIUM 40 MG: 40 INJECTION, POWDER, FOR SOLUTION INTRAVENOUS at 09:53

## 2021-02-22 RX ADMIN — Medication 250 MG: at 17:14

## 2021-02-22 NOTE — PLAN OF CARE
Problem: Potential for Falls  Goal: Patient will remain free of falls  Description: INTERVENTIONS:  - Assess patient frequently for physical needs  -  Identify cognitive and physical deficits and behaviors that affect risk of falls    -  [place] fall precautions as indicated by assessment   - Educate patient/family on patient safety including physical limitations  - Instruct patient to call for assistance with activity based on assessment  - Modify environment to reduce risk of injury  - Consider OT/PT consult to assist with strengthening/mobility  Outcome: Progressing     Problem: GASTROINTESTINAL - ADULT  Goal: Minimal or absence of nausea and/or vomiting  Description: INTERVENTIONS:  - Administer IV fluids if ordered to ensure adequate hydration  - Maintain NPO status until nausea and vomiting are resolved  - Nasogastric tube if ordered  - Administer ordered antiemetic medications as needed  - Provide nonpharmacologic comfort measures as appropriate  - Advance diet as tolerated, if ordered  - Consider nutrition services referral to assist patient with adequate nutrition and appropriate food choices  Outcome: Progressing  Goal: Maintains or returns to baseline bowel function  Description: INTERVENTIONS:  - Assess bowel function  - Encourage oral fluids to ensure adequate hydration  - Administer IV fluids if ordered to ensure adequate hydration  - Administer ordered medications as needed  - Encourage mobilization and activity  - Consider nutritional services referral to assist patient with adequate nutrition and appropriate food choices  Outcome: Progressing  Goal: Maintains adequate nutritional intake  Description: INTERVENTIONS:  - Monitor percentage of each meal consumed  - Identify factors contributing to decreased intake, treat as appropriate  - Assist with meals as needed  - Monitor I&O, weight, and lab values if indicated  - Obtain nutrition services referral as needed  Outcome: Progressing  Goal: Establish and maintain optimal ostomy function  Description: INTERVENTIONS:  - Assess bowel function  - Encourage oral fluids to ensure adequate hydration  - Administer IV fluids if ordered to ensure adequate hydration   - Administer ordered medications as needed  - Encourage mobilization and activity  - Nutrition services referral to assist patient with appropriate food choices  - Assess stoma site  - Consider wound care consult   Outcome: Progressing     Problem: PAIN - ADULT  Goal: Verbalizes/displays adequate comfort level or baseline comfort level  Description: Interventions:  - Encourage patient to monitor pain and request assistance  - Assess pain using appropriate pain scale  - Administer analgesics based on type and severity of pain and evaluate response  - Implement non-pharmacological measures as appropriate and evaluate response  - Consider cultural and social influences on pain and pain management  - Notify physician/advanced practitioner if interventions unsuccessful or patient reports new pain  Outcome: Progressing     Problem: INFECTION - ADULT  Goal: Absence or prevention of progression during hospitalization  Description: INTERVENTIONS:  - Assess and monitor for signs and symptoms of infection  - Monitor lab/diagnostic results  - Monitor all insertion sites, i e  indwelling lines, tubes, and drains  - Monitor endotracheal if appropriate and nasal secretions for changes in amount and color  - [place] appropriate cooling/warming therapies per order  - Administer medications as ordered  - Instruct and encourage patient and family to use good hand hygiene technique  - Identify and instruct in appropriate isolation precautions for identified infection/condition  Outcome: Progressing  Goal: Absence of fever/infection during neutropenic period  Description: INTERVENTIONS:  - Monitor WBC    Outcome: Progressing     Problem: SAFETY ADULT  Goal: Patient will remain free of falls  Description: INTERVENTIONS:  - Assess patient frequently for physical needs  -  Identify cognitive and physical deficits and behaviors that affect risk of falls    -  [place] fall precautions as indicated by assessment   - Educate patient/family on patient safety including physical limitations  - Instruct patient to call for assistance with activity based on assessment  - Modify environment to reduce risk of injury  - Consider OT/PT consult to assist with strengthening/mobility  Outcome: Progressing  Goal: Maintain or return to baseline ADL function  Description: INTERVENTIONS:  -  Assess patient's ability to carry out ADLs; assess patient's baseline for ADL function and identify physical deficits which impact ability to perform ADLs (bathing, care of mouth/teeth, toileting, grooming, dressing, etc )  - Assess/evaluate cause of self-care deficits   - Assess range of motion  - Assess patient's mobility; develop plan if impaired  - Assess patient's need for assistive devices and provide as appropriate  - Encourage maximum independence but intervene and supervise when necessary  - Involve family in performance of ADLs  - Assess for home care needs following discharge   - Consider OT consult to assist with ADL evaluation and planning for discharge  - Provide patient education as appropriate  Outcome: Progressing  Goal: Maintain or return mobility status to optimal level  Description: INTERVENTIONS:  - Assess patient's baseline mobility status (ambulation, transfers, stairs, etc )    - Identify cognitive and physical deficits and behaviors that affect mobility  - Identify mobility aids required to assist with transfers and/or ambulation (gait belt, sit-to-stand, lift, walker, cane, etc )  - [place] fall precautions as indicated by assessment  - Record patient progress and toleration of activity level on Mobility SBAR; progress patient to next Phase/Stage  - Instruct patient to call for assistance with activity based on assessment  - Consider rehabilitation consult to assist with strengthening/weightbearing, etc   Outcome: Progressing     Problem: Prexisting or High Potential for Compromised Skin Integrity  Goal: Skin integrity is maintained or improved  Description: INTERVENTIONS:  - Identify patients at risk for skin breakdown  - Assess and monitor skin integrity  - Assess and monitor nutrition and hydration status  - Monitor labs   - Assess for incontinence   - Turn and reposition patient  - Assist with mobility/ambulation  - Relieve pressure over bony prominences  - Avoid friction and shearing  - Provide appropriate hygiene as needed including keeping skin clean and dry  - Evaluate need for skin moisturizer/barrier cream  - Collaborate with interdisciplinary team   - Patient/family teaching  - Consider wound care consult   Outcome: Progressing

## 2021-02-22 NOTE — CASE MANAGEMENT
LOS: 4 days  Continuing to follow patient  Met with patient and informed her that [address] [place] [address]  and [facility] do not have availability to see her  [address]   [address] is able to see her with [facility] 2/24  She is agreeable

## 2021-02-22 NOTE — PROGRESS NOTES
Spoke with Sierra Mario on [facility] PA  She gave orders to check residual for patient via NG tube  Gave ok to pull NG tube if residual was less than 150cc  No residual noted  NG tube pulled

## 2021-02-22 NOTE — PROGRESS NOTES
Progress Note - General Surgery   Robb Soto 70 y o  female MRN: [id number]  Unit/Bed#: -01 Encounter: [id number]         Assessment/Plan:  Perforated sigmoid colon diverticulitis:  -POD #4 s/p Mery's procedure with end colostomy  -Ng tube out, tolerating clears  Colostomy 110 since yesterday    -will advance diet  -leukocytosis improved   continue IV antibiotics-- switch to po  -continue pain control and antiemetics  -Continue drain  OP 50 in last 24  -ostomy and dressing replaced for stool leakage, ostomy nurse providing care  -continue to encourage OOB, ambulation, incentive spirometer  -CXR clear  - DVT and GI prophylaxis      Hypokalemia  -improved today, continue to monitor electrolytes  K 3 6 this am    HTN/HLD:  -continue home medications  -monitor BP    Subjective/Objective       Subjective:Pt doing very well  Tolerating po  Ambulating  Will advance diet to surg soft    Objective:    Blood pressure (!) 173/85, pulse 61, temperature 97 5 °F (36 4 °C), temperature source Axillary, resp  rate 16, height 5' (1 524 m), weight 68 kg (150 lb), SpO2 98 %, not currently breastfeeding  ,Body mass index is 29 29 kg/m²        Intake/Output Summary (Last 24 hours) at 2/22/2021 1006  Last data filed at 2/22/2021 1002  Gross per 24 hour   Intake 2216 67 ml   Output 1710 ml   Net 506 67 ml       Invasive Devices     Peripheral Intravenous Line            Peripheral IV 02/19/21 Distal;Right;Ventral (anterior) Forearm 2 days          Drain            Closed/Suction Drain Right RLQ Bulb 15 Fr  3 days    Colostomy LLQ 3 days                Physical Exam:   General appearance: alert and oriented, in no acute distress  Head: Normocephalic, without obvious abnormality, atraumatic, sclerae anicteric, mucous membranes moist  Neck: no JVD and supple, symmetrical, trachea midline  Lungs: clear to auscultation, no wheezes or rales  Heart:   Regular rate and rhythm, S1-S2 normal, no murmur  Abdomen:   Non-distendeed, no tympany, soft, active bowel sounds, tender over incisional sites  Ostomy dark but viable with brown soft stool output  Extremities:   No edema, redness or tenderness in the calves or thighs  Skin: Warm, dry; incision sites clean, dry intact  Drain with blood-tinged serous output  Nursing notes and vital signs reviewed      Lab, Imaging and other studies:  I have personally reviewed pertinent lab results    , CBC:   Lab Results   Component Value Date    WBC 8 55 02/22/2021    HGB 10 0 (L) 02/22/2021    HCT 30 8 (L) 02/22/2021    MCV 92 02/22/2021     02/22/2021    MCH 29 8 02/22/2021    MCHC 32 5 02/22/2021    RDW 13 2 02/22/2021    MPV 9 8 02/22/2021    NRBC 0 02/22/2021   , CMP:   Lab Results   Component Value Date    SODIUM 136 02/22/2021    K 3 6 02/22/2021     02/22/2021    CO2 27 02/22/2021    BUN 19 02/22/2021    CREATININE 0 85 02/22/2021    CALCIUM 8 3 02/22/2021    EGFR 68 02/22/2021     VTE Pharmacologic Prophylaxis: Heparin  VTE Mechanical Prophylaxis: sequential compression device     Breanna Mireles PA-C

## 2021-02-22 NOTE — WOUND OSTOMY CARE
Progress Note- Ostomy  Rodney Billings 70 y o  female  [id number]  --01 (UB XRAY)        Assessment: Patient is seen for ostomy care and teaching   The patient is post op day 4 s/p Mery's procedure with end colostomy   The patient is alert and pleasant for the teaching and has good questions about the ostomy care and management   The following information was reviewed with the patient and hands on bag change completed   Stoma is moist red / pink oval in shape measuring 2 1/4 inch side to side  by 1 1/8 inch top to bottom   Deep crease at the 9 o'clock area and noted slight wash out on the bag at the 6 o'clock area due to the os is pointing down at 6 o'clock   Reviewed the following information   Removal of the bag , cleansing of the [place] stomal skin , measuring the stoma size , one piece versus 2 piece , 2 piece moldable , 3 m barrier cavilon to the [place] stomal skin , application of the strip paste at the 9 o'clock and 6 o'clock areas , willy rings , stoma belts  stoma paste , stoma powder ,  how to open and close the bag , when to empty , frequency of bag changes , review of dietary foods affecting the output , 3 different companies of ostomy companies  , ordering of supplies how many supplies per month , showering , lifestyle ,  Ostomy secret clothing line for underwear and bathing suits ,swimming / activities ,  support and websites of support information ,Patient may need convexity in the future or a belt due to the crease   Placed ostomy supplies of 1 piece and 2 piece appliance , stoma powder , 3 M no sting barrier wipes , stoma belt , wipes , stoma paste , strip paste , willy and gloves  Literature and ostomy kit for colostomy care at the bedside   The literature  reviews all of the information listed above for the teaching   Reviewed information with the patient 's daughter due to the patient will be going to the daughters home after discharge           Plan:    Continue to support for ostomy care and teaching                   Vitals:    02/22/21 1105   BP: 151/67   Pulse:    Resp:    Temp:    SpO2:              Colostomy LLQ (Active)   Stomal Appliance 1 piece; Changed 02/22/21 1401   Stoma Assessment Budded 02/22/21 1401   Stoma size (in) 1 5 in 02/18/21 2258   Stoma Shape Oval 02/22/21 1401   Peristomal Assessment Clean; Intact 02/22/21 1401   Treatment Bag change 02/22/21 1401   Output (mL) 50 mL 02/22/21 0501   Number of days: [id number] RN BSN Jose Meza

## 2021-02-22 NOTE — PLAN OF CARE
Problem: Potential for Falls  Goal: Patient will remain free of falls  Description: INTERVENTIONS:  - Assess patient frequently for physical needs  -  Identify cognitive and physical deficits and behaviors that affect risk of falls    -  [place] fall precautions as indicated by assessment   - Educate patient/family on patient safety including physical limitations  - Instruct patient to call for assistance with activity based on assessment  - Modify environment to reduce risk of injury  - Consider OT/PT consult to assist with strengthening/mobility  2/21/2021 2339 by Itz Ortiz RN  Outcome: Progressing  2/21/2021 2338 by Itz Ortiz RN  Outcome: Progressing

## 2021-02-22 NOTE — DISCHARGE INSTR - OTHER ORDERS
Reviewed the following information   Removal of the bag , cleansing of the [place] stomal skin , measuring the stoma size , one piece versus 2 piece , 2 piece moldable , 3 m barrier cavilon to the [place] stomal skin , application of the strip paste at the 9 o'clock and 6 o'clock areas , willy rings , stoma belts  stoma paste , stoma powder ,  how to open and close the bag , when to empty , frequency of bag changes , review of dietary foods affecting the output , 3 different companies of ostomy companies  , ordering of supplies how many supplies per month , showering , lifestyle ,  Ostomy secret clothing line for underwear and bathing suits ,swimming / activities ,  support and websites of support information ,     Website for information - [employer] , [employer]      Change ostomy bag every 3-5 days , empty when 1/3 full   Patient may need convexity in the future due to the crease are at 9 o'clock   Measure stoma size weekly and in 6 weeks this should be the size of the stoma    Then bags can be ordered pre - cut

## 2021-02-23 VITALS
RESPIRATION RATE: 12 BRPM | HEIGHT: 60 IN | SYSTOLIC BLOOD PRESSURE: 180 MMHG | HEART RATE: 62 BPM | DIASTOLIC BLOOD PRESSURE: 73 MMHG | OXYGEN SATURATION: 98 % | WEIGHT: 150 LBS | BODY MASS INDEX: 29.45 KG/M2 | TEMPERATURE: 98 F

## 2021-02-23 LAB
ANION GAP SERPL CALCULATED.3IONS-SCNC: 9 MMOL/L (ref 4–13)
BACTERIA BLD CULT: NORMAL
BACTERIA BLD CULT: NORMAL
BASOPHILS # BLD AUTO: 0.03 THOUSANDS/ΜL (ref 0–0.1)
BASOPHILS NFR BLD AUTO: 0 % (ref 0–1)
BUN SERPL-MCNC: 13 MG/DL (ref 5–25)
CALCIUM SERPL-MCNC: 8.6 MG/DL (ref 8.3–10.1)
CHLORIDE SERPL-SCNC: 100 MMOL/L (ref 100–108)
CO2 SERPL-SCNC: 26 MMOL/L (ref 21–32)
CREAT SERPL-MCNC: 0.83 MG/DL (ref 0.6–1.3)
EOSINOPHIL # BLD AUTO: 0.4 THOUSAND/ΜL (ref 0–0.61)
EOSINOPHIL NFR BLD AUTO: 4 % (ref 0–6)
ERYTHROCYTE [DISTWIDTH] IN BLOOD BY AUTOMATED COUNT: 13.6 % (ref 11.6–15.1)
GFR SERPL CREATININE-BSD FRML MDRD: 70 ML/MIN/1.73SQ M
GLUCOSE SERPL-MCNC: 109 MG/DL (ref 65–140)
HCT VFR BLD AUTO: 31.8 % (ref 34.8–46.1)
HGB BLD-MCNC: 10.1 G/DL (ref 11.5–15.4)
IMM GRANULOCYTES # BLD AUTO: 0.11 THOUSAND/UL (ref 0–0.2)
IMM GRANULOCYTES NFR BLD AUTO: 1 % (ref 0–2)
LYMPHOCYTES # BLD AUTO: 1.38 THOUSANDS/ΜL (ref 0.6–4.47)
LYMPHOCYTES NFR BLD AUTO: 14 % (ref 14–44)
MAGNESIUM SERPL-MCNC: 1.8 MG/DL (ref 1.6–2.6)
MCH RBC QN AUTO: 29.3 PG (ref 26.8–34.3)
MCHC RBC AUTO-ENTMCNC: 31.8 G/DL (ref 31.4–37.4)
MCV RBC AUTO: 92 FL (ref 82–98)
MONOCYTES # BLD AUTO: 0.91 THOUSAND/ΜL (ref 0.17–1.22)
MONOCYTES NFR BLD AUTO: 9 % (ref 4–12)
NEUTROPHILS # BLD AUTO: 7.36 THOUSANDS/ΜL (ref 1.85–7.62)
NEUTS SEG NFR BLD AUTO: 72 % (ref 43–75)
PLATELET # BLD AUTO: 268 THOUSANDS/UL (ref 149–390)
PMV BLD AUTO: 8.7 FL (ref 8.9–12.7)
POTASSIUM SERPL-SCNC: 3.2 MMOL/L (ref 3.5–5.3)
RBC # BLD AUTO: 3.45 MILLION/UL (ref 3.81–5.12)
SODIUM SERPL-SCNC: 135 MMOL/L (ref 136–145)
WBC # BLD AUTO: 10.19 THOUSAND/UL (ref 4.31–10.16)

## 2021-02-23 PROCEDURE — C9113 INJ PANTOPRAZOLE SODIUM, VIA: HCPCS | Performed by: PHYSICIAN ASSISTANT

## 2021-02-23 PROCEDURE — NC001 PR NO CHARGE: Performed by: SURGERY

## 2021-02-23 PROCEDURE — 83735 ASSAY OF MAGNESIUM: CPT | Performed by: PHYSICIAN ASSISTANT

## 2021-02-23 PROCEDURE — 85025 COMPLETE CBC W/AUTO DIFF WBC: CPT | Performed by: PHYSICIAN ASSISTANT

## 2021-02-23 PROCEDURE — 99024 POSTOP FOLLOW-UP VISIT: CPT | Performed by: SURGERY

## 2021-02-23 PROCEDURE — 80048 BASIC METABOLIC PNL TOTAL CA: CPT | Performed by: PHYSICIAN ASSISTANT

## 2021-02-23 RX ORDER — POTASSIUM CHLORIDE 20 MEQ/1
40 TABLET, EXTENDED RELEASE ORAL 2 TIMES DAILY
Status: DISCONTINUED | OUTPATIENT
Start: 2021-02-23 | End: 2021-02-23 | Stop reason: HOSPADM

## 2021-02-23 RX ORDER — POTASSIUM CHLORIDE 29.8 MG/ML
40 INJECTION INTRAVENOUS ONCE
Status: DISCONTINUED | OUTPATIENT
Start: 2021-02-23 | End: 2021-02-23

## 2021-02-23 RX ORDER — OXYCODONE HYDROCHLORIDE 5 MG/1
2.5-5 TABLET ORAL EVERY 6 HOURS PRN
Qty: 10 TABLET | Refills: 0 | Status: SHIPPED | OUTPATIENT
Start: 2021-02-23 | End: 2021-02-26

## 2021-02-23 RX ORDER — GABAPENTIN 300 MG/1
300 CAPSULE ORAL 2 TIMES DAILY
Status: DISCONTINUED | OUTPATIENT
Start: 2021-02-23 | End: 2021-02-23 | Stop reason: HOSPADM

## 2021-02-23 RX ORDER — LOSARTAN POTASSIUM 50 MG/1
100 TABLET ORAL DAILY
Status: DISCONTINUED | OUTPATIENT
Start: 2021-02-23 | End: 2021-02-23 | Stop reason: HOSPADM

## 2021-02-23 RX ORDER — LOSARTAN POTASSIUM 100 MG/1
100 TABLET ORAL DAILY
COMMUNITY

## 2021-02-23 RX ORDER — AMOXICILLIN AND CLAVULANATE POTASSIUM 875; 125 MG/1; MG/1
1 TABLET, FILM COATED ORAL EVERY 12 HOURS SCHEDULED
Qty: 14 TABLET | Refills: 0 | Status: SHIPPED | OUTPATIENT
Start: 2021-02-23 | End: 2021-03-02

## 2021-02-23 RX ADMIN — Medication 250 MG: at 08:32

## 2021-02-23 RX ADMIN — OXYCODONE HYDROCHLORIDE 5 MG: 5 TABLET ORAL at 02:14

## 2021-02-23 RX ADMIN — LOSARTAN POTASSIUM 100 MG: 50 TABLET, FILM COATED ORAL at 10:29

## 2021-02-23 RX ADMIN — ACETAMINOPHEN 975 MG: 325 TABLET, FILM COATED ORAL at 02:18

## 2021-02-23 RX ADMIN — ACETAMINOPHEN 975 MG: 325 TABLET, FILM COATED ORAL at 05:11

## 2021-02-23 RX ADMIN — GABAPENTIN 300 MG: 300 CAPSULE ORAL at 08:32

## 2021-02-23 RX ADMIN — GUAIFENESIN 600 MG: 600 TABLET ORAL at 08:32

## 2021-02-23 RX ADMIN — CEFTRIAXONE 1000 MG: 1 INJECTION, SOLUTION INTRAVENOUS at 02:18

## 2021-02-23 RX ADMIN — HEPARIN SODIUM 5000 UNITS: 5000 INJECTION INTRAVENOUS; SUBCUTANEOUS at 05:10

## 2021-02-23 RX ADMIN — POTASSIUM CHLORIDE 40 MEQ: 1500 TABLET, EXTENDED RELEASE ORAL at 08:32

## 2021-02-23 RX ADMIN — PANTOPRAZOLE SODIUM 40 MG: 40 INJECTION, POWDER, FOR SOLUTION INTRAVENOUS at 08:32

## 2021-02-23 RX ADMIN — CHLORTHALIDONE 25 MG: 25 TABLET ORAL at 08:32

## 2021-02-23 RX ADMIN — METRONIDAZOLE 500 MG: 500 INJECTION, SOLUTION INTRAVENOUS at 06:35

## 2021-02-23 RX ADMIN — ATENOLOL 100 MG: 50 TABLET ORAL at 08:33

## 2021-02-23 NOTE — PLAN OF CARE
Problem: Potential for Falls  Goal: Patient will remain free of falls  Description: INTERVENTIONS:  - Assess patient frequently for physical needs  -  Identify cognitive and physical deficits and behaviors that affect risk of falls    -  [place] fall precautions as indicated by assessment   - Educate patient/family on patient safety including physical limitations  - Instruct patient to call for assistance with activity based on assessment  - Modify environment to reduce risk of injury  - Consider OT/PT consult to assist with strengthening/mobility  2/23/2021 0949 by Robert Pradhan RN  Outcome: Adequate for Discharge  2/23/2021 0753 by Robert Pradhan RN  Outcome: Adequate for Discharge  2/23/2021 0752 by Robert Pradhan RN  Outcome: Adequate for Discharge     Problem: GASTROINTESTINAL - ADULT  Goal: Minimal or absence of nausea and/or vomiting  Description: INTERVENTIONS:  - Administer IV fluids if ordered to ensure adequate hydration  - Maintain NPO status until nausea and vomiting are resolved  - Nasogastric tube if ordered  - Administer ordered antiemetic medications as needed  - Provide nonpharmacologic comfort measures as appropriate  - Advance diet as tolerated, if ordered  - Consider nutrition services referral to assist patient with adequate nutrition and appropriate food choices  2/23/2021 0949 by Robert Pradhan RN  Outcome: Adequate for Discharge  2/23/2021 0753 by Robert Pradhan RN  Outcome: Adequate for Discharge  2/23/2021 1161 by Robert Pradhan RN  Outcome: Adequate for Discharge  Goal: Maintains or returns to baseline bowel function  Description: INTERVENTIONS:  - Assess bowel function  - Encourage oral fluids to ensure adequate hydration  - Administer IV fluids if ordered to ensure adequate hydration  - Administer ordered medications as needed  - Encourage mobilization and activity  - Consider nutritional services referral to assist patient with adequate nutrition and appropriate food choices  2/23/2021 0967 by Kathi Trevino RN  Outcome: Adequate for Discharge  2/23/2021 9776 by Kathi Trevino RN  Outcome: Adequate for Discharge  2/23/2021 1128 by Kathi Trevino RN  Outcome: Adequate for Discharge  Goal: Maintains adequate nutritional intake  Description: INTERVENTIONS:  - Monitor percentage of each meal consumed  - Identify factors contributing to decreased intake, treat as appropriate  - Assist with meals as needed  - Monitor I&O, weight, and lab values if indicated  - Obtain nutrition services referral as needed  2/23/2021 0949 by Kathi Trevino RN  Outcome: Adequate for Discharge  2/23/2021 0753 by Kathi Trevino RN  Outcome: Adequate for Discharge  2/23/2021 1996 by Kathi Trevino RN  Outcome: Adequate for Discharge  Goal: Establish and maintain optimal ostomy function  Description: INTERVENTIONS:  - Assess bowel function  - Encourage oral fluids to ensure adequate hydration  - Administer IV fluids if ordered to ensure adequate hydration   - Administer ordered medications as needed  - Encourage mobilization and activity  - Nutrition services referral to assist patient with appropriate food choices  - Assess stoma site  - Consider wound care consult   2/23/2021 0949 by Kathi Trevino RN  Outcome: Adequate for Discharge  2/23/2021 0753 by Kathi Trevino RN  Outcome: Adequate for Discharge  2/23/2021 9356 by Kathi Trevino RN  Outcome: Adequate for Discharge     Problem: PAIN - ADULT  Goal: Verbalizes/displays adequate comfort level or baseline comfort level  Description: Interventions:  - Encourage patient to monitor pain and request assistance  - Assess pain using appropriate pain scale  - Administer analgesics based on type and severity of pain and evaluate response  - Implement non-pharmacological measures as appropriate and evaluate response  - Consider cultural and social influences on pain and pain management  - Notify physician/advanced practitioner if interventions unsuccessful or patient reports new pain  2/23/2021 0949 by Dion Mccrary RN  Outcome: Adequate for Discharge  2/23/2021 6120 by Dion Mccrary RN  Outcome: Adequate for Discharge  2/23/2021 3781 by Dion Mccrary RN  Outcome: Adequate for Discharge     Problem: INFECTION - ADULT  Goal: Absence or prevention of progression during hospitalization  Description: INTERVENTIONS:  - Assess and monitor for signs and symptoms of infection  - Monitor lab/diagnostic results  - Monitor all insertion sites, i e  indwelling lines, tubes, and drains  - Monitor endotracheal if appropriate and nasal secretions for changes in amount and color  - [place] appropriate cooling/warming therapies per order  - Administer medications as ordered  - Instruct and encourage patient and family to use good hand hygiene technique  - Identify and instruct in appropriate isolation precautions for identified infection/condition  2/23/2021 0949 by Dion Mccrary RN  Outcome: Adequate for Discharge  2/23/2021 0753 by Dion Mccrary RN  Outcome: Adequate for Discharge  2/23/2021 5491 by Dion Mccrary RN  Outcome: Adequate for Discharge  Goal: Absence of fever/infection during neutropenic period  Description: INTERVENTIONS:  - Monitor WBC    2/23/2021 0949 by Dion Mccrary RN  Outcome: Adequate for Discharge  2/23/2021 0753 by Dion Mccrary RN  Outcome: Adequate for Discharge  2/23/2021 0752 by Dion Mccrary RN  Outcome: Adequate for Discharge     Problem: SAFETY ADULT  Goal: Patient will remain free of falls  Description: INTERVENTIONS:  - Assess patient frequently for physical needs  -  Identify cognitive and physical deficits and behaviors that affect risk of falls    -  [place] fall precautions as indicated by assessment   - Educate patient/family on patient safety including physical limitations  - Instruct patient to call for assistance with activity based on assessment  - Modify environment to reduce risk of injury  - Consider OT/PT consult to assist with strengthening/mobility  2/23/2021 0949 by Mary Moore RN  Outcome: Adequate for Discharge  2/23/2021 7859 by Mary Moore RN  Outcome: Adequate for Discharge  2/23/2021 5724 by Mary Moore RN  Outcome: Adequate for Discharge  Goal: Maintain or return to baseline ADL function  Description: INTERVENTIONS:  -  Assess patient's ability to carry out ADLs; assess patient's baseline for ADL function and identify physical deficits which impact ability to perform ADLs (bathing, care of mouth/teeth, toileting, grooming, dressing, etc )  - Assess/evaluate cause of self-care deficits   - Assess range of motion  - Assess patient's mobility; develop plan if impaired  - Assess patient's need for assistive devices and provide as appropriate  - Encourage maximum independence but intervene and supervise when necessary  - Involve family in performance of ADLs  - Assess for home care needs following discharge   - Consider OT consult to assist with ADL evaluation and planning for discharge  - Provide patient education as appropriate  2/23/2021 0949 by Mary Moore RN  Outcome: Adequate for Discharge  2/23/2021 0753 by Mary Moore RN  Outcome: Adequate for Discharge  2/23/2021 4709 by Mary Moore RN  Outcome: Adequate for Discharge  Goal: Maintain or return mobility status to optimal level  Description: INTERVENTIONS:  - Assess patient's baseline mobility status (ambulation, transfers, stairs, etc )    - Identify cognitive and physical deficits and behaviors that affect mobility  - Identify mobility aids required to assist with transfers and/or ambulation (gait belt, sit-to-stand, lift, walker, cane, etc )  - [place] fall precautions as indicated by assessment  - Record patient progress and toleration of activity level on Mobility SBAR; progress patient to next Phase/Stage  - Instruct patient to call for assistance with activity based on assessment  - Consider rehabilitation consult to assist with strengthening/weightbearing, etc   2/23/2021 0949 by Ginna James RN  Outcome: Adequate for Discharge  2/23/2021 1760 by Ginna James RN  Outcome: Adequate for Discharge  2/23/2021 6056 by Ginna James RN  Outcome: Adequate for Discharge     Problem: Prexisting or High Potential for Compromised Skin Integrity  Goal: Skin integrity is maintained or improved  Description: INTERVENTIONS:  - Identify patients at risk for skin breakdown  - Assess and monitor skin integrity  - Assess and monitor nutrition and hydration status  - Monitor labs   - Assess for incontinence   - Turn and reposition patient  - Assist with mobility/ambulation  - Relieve pressure over bony prominences  - Avoid friction and shearing  - Provide appropriate hygiene as needed including keeping skin clean and dry  - Evaluate need for skin moisturizer/barrier cream  - Collaborate with interdisciplinary team   - Patient/family teaching  - Consider wound care consult   2/23/2021 0949 by Ginna James RN  Outcome: Adequate for Discharge  2/23/2021 0753 by Ginna James RN  Outcome: Adequate for Discharge  2/23/2021 9957 by Ginna James RN  Outcome: Adequate for Discharge

## 2021-02-23 NOTE — PLAN OF CARE
Problem: Potential for Falls  Goal: Patient will remain free of falls  Description: INTERVENTIONS:  - Assess patient frequently for physical needs  -  Identify cognitive and physical deficits and behaviors that affect risk of falls    -  [place] fall precautions as indicated by assessment   - Educate patient/family on patient safety including physical limitations  - Instruct patient to call for assistance with activity based on assessment  - Modify environment to reduce risk of injury  - Consider OT/PT consult to assist with strengthening/mobility  2/23/2021 0753 by Ashwini Yee RN  Outcome: Adequate for Discharge  2/23/2021 0752 by Ashwini Yee RN  Outcome: Adequate for Discharge     Problem: GASTROINTESTINAL - ADULT  Goal: Minimal or absence of nausea and/or vomiting  Description: INTERVENTIONS:  - Administer IV fluids if ordered to ensure adequate hydration  - Maintain NPO status until nausea and vomiting are resolved  - Nasogastric tube if ordered  - Administer ordered antiemetic medications as needed  - Provide nonpharmacologic comfort measures as appropriate  - Advance diet as tolerated, if ordered  - Consider nutrition services referral to assist patient with adequate nutrition and appropriate food choices  2/23/2021 0753 by Ashwini Yee RN  Outcome: Adequate for Discharge  2/23/2021 0752 by Ashwini Yee RN  Outcome: Adequate for Discharge  Goal: Maintains or returns to baseline bowel function  Description: INTERVENTIONS:  - Assess bowel function  - Encourage oral fluids to ensure adequate hydration  - Administer IV fluids if ordered to ensure adequate hydration  - Administer ordered medications as needed  - Encourage mobilization and activity  - Consider nutritional services referral to assist patient with adequate nutrition and appropriate food choices  2/23/2021 0753 by Ashwini Yee RN  Outcome: Adequate for Discharge  2/23/2021 0752 by Ashwini Yee RN  Outcome: Adequate for Discharge  Goal: Maintains adequate nutritional intake  Description: INTERVENTIONS:  - Monitor percentage of each meal consumed  - Identify factors contributing to decreased intake, treat as appropriate  - Assist with meals as needed  - Monitor I&O, weight, and lab values if indicated  - Obtain nutrition services referral as needed  2/23/2021 0753 by Shirlene Gilbert RN  Outcome: Adequate for Discharge  2/23/2021 1288 by Shirlene Gilbert RN  Outcome: Adequate for Discharge  Goal: Establish and maintain optimal ostomy function  Description: INTERVENTIONS:  - Assess bowel function  - Encourage oral fluids to ensure adequate hydration  - Administer IV fluids if ordered to ensure adequate hydration   - Administer ordered medications as needed  - Encourage mobilization and activity  - Nutrition services referral to assist patient with appropriate food choices  - Assess stoma site  - Consider wound care consult   2/23/2021 0753 by Shirlene Gilbert RN  Outcome: Adequate for Discharge  2/23/2021 4304 by Shirlene Gilbert RN  Outcome: Adequate for Discharge     Problem: PAIN - ADULT  Goal: Verbalizes/displays adequate comfort level or baseline comfort level  Description: Interventions:  - Encourage patient to monitor pain and request assistance  - Assess pain using appropriate pain scale  - Administer analgesics based on type and severity of pain and evaluate response  - Implement non-pharmacological measures as appropriate and evaluate response  - Consider cultural and social influences on pain and pain management  - Notify physician/advanced practitioner if interventions unsuccessful or patient reports new pain  2/23/2021 0753 by Shirlene Gilbert RN  Outcome: Adequate for Discharge  2/23/2021 0752 by Shirlene Gilbert RN  Outcome: Adequate for Discharge     Problem: INFECTION - ADULT  Goal: Absence or prevention of progression during hospitalization  Description: INTERVENTIONS:  - Assess and monitor for signs and symptoms of infection  - Monitor lab/diagnostic results  - Monitor all insertion sites, i e  indwelling lines, tubes, and drains  - Monitor endotracheal if appropriate and nasal secretions for changes in amount and color  - [place] appropriate cooling/warming therapies per order  - Administer medications as ordered  - Instruct and encourage patient and family to use good hand hygiene technique  - Identify and instruct in appropriate isolation precautions for identified infection/condition  2/23/2021 0753 by Saurav Diaz RN  Outcome: Adequate for Discharge  2/23/2021 0752 by Saurav Diaz RN  Outcome: Adequate for Discharge  Goal: Absence of fever/infection during neutropenic period  Description: INTERVENTIONS:  - Monitor WBC    2/23/2021 0753 by Saurav Diaz RN  Outcome: Adequate for Discharge  2/23/2021 0752 by Saurav Diaz RN  Outcome: Adequate for Discharge     Problem: SAFETY ADULT  Goal: Patient will remain free of falls  Description: INTERVENTIONS:  - Assess patient frequently for physical needs  -  Identify cognitive and physical deficits and behaviors that affect risk of falls    -  [place] fall precautions as indicated by assessment   - Educate patient/family on patient safety including physical limitations  - Instruct patient to call for assistance with activity based on assessment  - Modify environment to reduce risk of injury  - Consider OT/PT consult to assist with strengthening/mobility  2/23/2021 0753 by Saurav Diaz RN  Outcome: Adequate for Discharge  2/23/2021 0752 by Saurav Diaz RN  Outcome: Adequate for Discharge  Goal: Maintain or return to baseline ADL function  Description: INTERVENTIONS:  -  Assess patient's ability to carry out ADLs; assess patient's baseline for ADL function and identify physical deficits which impact ability to perform ADLs (bathing, care of mouth/teeth, toileting, grooming, dressing, etc )  - Assess/evaluate cause of self-care deficits   - Assess range of motion  - Assess patient's mobility; develop plan if impaired  - Assess patient's need for assistive devices and provide as appropriate  - Encourage maximum independence but intervene and supervise when necessary  - Involve family in performance of ADLs  - Assess for home care needs following discharge   - Consider OT consult to assist with ADL evaluation and planning for discharge  - Provide patient education as appropriate  2/23/2021 0753 by Robert Pradhan RN  Outcome: Adequate for Discharge  2/23/2021 0752 by Robert Pradhan RN  Outcome: Adequate for Discharge  Goal: Maintain or return mobility status to optimal level  Description: INTERVENTIONS:  - Assess patient's baseline mobility status (ambulation, transfers, stairs, etc )    - Identify cognitive and physical deficits and behaviors that affect mobility  - Identify mobility aids required to assist with transfers and/or ambulation (gait belt, sit-to-stand, lift, walker, cane, etc )  - [place] fall precautions as indicated by assessment  - Record patient progress and toleration of activity level on Mobility SBAR; progress patient to next Phase/Stage  - Instruct patient to call for assistance with activity based on assessment  - Consider rehabilitation consult to assist with strengthening/weightbearing, etc   2/23/2021 0753 by Robert Pradhan RN  Outcome: Adequate for Discharge  2/23/2021 0752 by Robert Pradhan RN  Outcome: Adequate for Discharge     Problem: Prexisting or High Potential for Compromised Skin Integrity  Goal: Skin integrity is maintained or improved  Description: INTERVENTIONS:  - Identify patients at risk for skin breakdown  - Assess and monitor skin integrity  - Assess and monitor nutrition and hydration status  - Monitor labs   - Assess for incontinence   - Turn and reposition patient  - Assist with mobility/ambulation  - Relieve pressure over bony prominences  - Avoid friction and shearing  - Provide appropriate hygiene as needed including keeping skin clean and dry  - Evaluate need for skin moisturizer/barrier cream  - Collaborate with interdisciplinary team   - Patient/family teaching  - Consider wound care consult   2/23/2021 0753 by Shirlene Gilbert RN  Outcome: Adequate for Discharge  2/23/2021 0093 by Shirleen Gilbert RN  Outcome: Adequate for Discharge

## 2021-02-23 NOTE — DISCHARGE SUMMARY
Discharge Summary - Chio Dey 70 y o  female MRN: [id number]    Unit/Bed#: -01 Encounter: [id number]    Admission Date: 2/18/2021     Admitting Diagnosis: Abdominal pain [R10 9]  Perforated bowel ([address] ) [K63 1]  Sepsis [facility]) [A41 9]    Discharge Date: 2/23/2021    Consulting Physicians: There were no consulting physicians on this admission    HPI:  Patient is a 82-year-old female who presented to the emergency department on 02/18/2021 with complaints of a one-day history of severe abdominal pain mainly across her lower abdomen  Denied associated symptoms  Patient is known to the surgical service from recent laparoscopic inguinal hernia repair done in July 2020  CT scan in the emergency department showed numerous foci of free air without definitive source  Patient was admitted for emergent surgical  intervention  Procedures Performed:  Diagnostic laparoscopy converted to exploratory laparotomy with Mery's procedure and end colostomy      Hospital Course:  Patient was admitted and taken emergently to the operating room for the above-stated procedure  Procedure was completed without complications  Surgical drain was placed intraoperatively  Postoperatively patient was transferred to the medical-surgical unit where she recovered well a survey well  She remained stable throughout her hospital course  Patient continued on IV antibiotics and diet was slowly advanced  Leukocytosis improved  Electrolytes were monitored and replaced as needed  She was restarted on her home medications when she was tolerating p o  Over the weekend she did have some vomiting but showed no indication of postoperative ileus  Her diet was decreased and again advanced as tolerated  By postoperative day 5  The patient was doing well  She was tolerating a regular diet  Her ostomy was viable and functioning well  She received ostomy teaching  Her drain was putting out small amounts of clear fluid    She was able to get out of bed and ambulate  Her incision site was intact  Her vital signs were stable and she remained afebrile  She was cleared for discharge to home  She was discharged with surgical drain in place  Patient received a full set of written discharge instructions were reviewed with her and her daughter prior to discharge  She will continue Tylenol and ibuprofen as needed for pain control  She will use narcotics only if needed  She was prescribed a course of Augmentin that she will complete at home  She will monitor her drain output  She will have VNA assistance for postoperative follow-up and ostomy care  She will follow up in Dr Esteban Car is office in 1 week  She prefers to have her follow-up in [facility]  Patient does not want to travel to [facility]  She or her daughter will call with any questions or concerns  Discharge Diagnosis:   1  Perforated bowel ([facility] [address] [facility] )    2  Sepsis ([facility] [address] )    3  Col[facility] status ([facility] )        Condition at Discharge: stable     Discharge Medications: See after visit summary for reconciled discharge medications provided to patient and family  Discharge instructions/Information to patient and family:   See after visit summary for information provided to patient and family  Provisions for Follow-Up Care:  See after visit summary for information related to follow-up care and any pertinent home health orders  Disposition: Home (daughter's home) with VNA services    Planned Readmission: No    Discharge Statement   I spent 20 minutes discharging the patient  This time was spent on the day of discharge  I had direct contact with the patient on the day of discharge  Additional documentation is required if more than 30 minutes were spent on discharge  This text is generated with voice recognition software  There may be translation, syntax,  or grammatical errors  If you have any questions, please contact the dictating provider       Signature:   Ghazala Brice JAD Mario  Date: 2/23/2021 Time: 12:22 PM

## 2021-02-23 NOTE — DISCHARGE INSTRUCTIONS
Joshua Garner Instructions                  Dr Sunita ANDERSON     1  General: You will feel pulling sensations around the wound or funny aches and pains around the incisions  This is normal  Even minor surgery is a change in your body and this is your bodys way of reaction to it  If you have had abdominal surgery, it may help to support the incision with a small pillow or blanket for comfort when moving or coughing  2  Wound care:    Bandage/Dressing -change dressing at drain site daily  Cover midline incision as needed with dry gauze only for comfort  Routine ostomy care  Empty and record drain output 2 times daily and as needed  Bring a record of drain output to follow-up appoint with Dr Tatiana Howell      3  Water: You may shower over the wounds  Cover drain site with plastic wrap for showering  Do not bathe or use a pool or hot tub until cleared by the physician  You may shower right over the incisions and rinse wound with soapy water but do not scrub incisions  Pat dry when you are done  4  Activity: You may go up and down stairs, walk as much as you are comfortable, but walk at least 3 times each day  If you have had abdominal surgery, do not lift anything heavier than 15 pounds for at least 2 weeks, unless cleared by the doctor  5  Diet: You may resume a regular diet  If you had a same-day surgery or overnight stay surgery, you may wish to eat lightly for a few days: soups, crackers, and sandwiches  You may resume a regular diet when ready  6  Medications: Resume all of your previous medications, unless told otherwise by the doctor  Avoid aspirin products for 2-3 days after the date of surgery  You may, at that time, began to take them again  Tylenol and Ibuprofen are always fine, unless you are taking any narcotic pain medication containing Tylenol (such as Percocet, Darvocet, Vicodin, or anything containing acetaminophen)   Do not take Tylenol if you're taking these medications  You do not need to take the narcotic pain medications unless you are having significant pain and discomfort  7  Driving: You will need someone to drive you home on the day of surgery  Do not drive or make any important decisions while on narcotic pain medication or 24 hours and after anesthesia or sedation for surgery  Generally, you may drive when your off all narcotic pain medications  8  Upset Stomach: You may take Maalox, Tums, or similar items for an upset stomach  If your narcotic pain medication causes an upset stomach, do not take it on an empty stomach  Try taking it with at least some crackers or toast      9  Constipation: Patients often experienced constipation after surgery  You may take over-the-counter medication for this, such as Metamucil, Senokot, Dulcolax, milk of magnesia, etc  You may take a suppository unless you have had anorectal surgery such as a procedure on your hemorrhoids  If you experience significant nausea or vomiting after abdominal surgery, call the office before trying any of these medications  10  Call the office: If you are experiencing any of the following, fevers above 101 5°, significant nausea or vomiting, if the wound develops drainage and/or is excessive redness around the wound, or if you have significant diarrhea or other worsening symptoms  11  Pain: You may be given a prescription for pain  This will be given to the hospital, the day of surgery  12  Sexual Activity: You may resume sexual activity when you feel ready and comfortable and your incision is sealed and healed without apparent infection risk  13  Urination: If you haven't urinated in 6 hours, go directly to the ER for evaluation for urinary retention  14  Follow-up in 2 weeks          [facility]  Phone: [phone]

## 2021-02-23 NOTE — PROGRESS NOTES
Progress Note - General Surgery   Petra Palomo 70 y o  female MRN: [id number]  Unit/Bed#: -01 Encounter: [id number]    Assessment/Plan:  -POD #5 s/p Mery's procedure with end colostomy  -patient feeling well, pain controlled, tolerating diet, good ostomy function  -will plan for discharged home today  -discharge with drain, teaching provided  -continue regular diet  -Tylenol and ibuprofen for pain control, use narcotics only as needed  -continue out of bed and ambulation  -VNA care scheduled for postop assessment and ostomy care, drain care  -surgical follow-up in 1 week for staple removal and drain removal  -postoperative instructions reviewed with patient and daughter        Hypokalemia  -potassium 3 2 today likely to be related to IV fluid  -will replace orally     HTN/HLD:  -continue home medications  -blood pressure elevated this a m   -patient is apparently on Cozaar which was not on her medication list, will restart  -monitor BP         Subjective/Objective   Chief Complaint:  Incisional pain    Subjective:  Still with some incisional pain  Using narcotics only at bedtime  Otherwise out of bed and ambulating  Ostomy is functioning well  Seen by wound care nurse  No further nausea or vomiting  No fevers or chills  Objective:     Blood pressure (!) 175/77, pulse 62, temperature 98 °F (36 7 °C), temperature source Oral, resp  rate 12, height 5' (1 524 m), weight 68 kg (150 lb), SpO2 98 %, not currently breastfeeding  ,Body mass index is 29 29 kg/m²        Intake/Output Summary (Last 24 hours) at 2/23/2021 0901  Last data filed at 2/23/2021 0803  Gross per 24 hour   Intake 1326 67 ml   Output 730 ml   Net 596 67 ml       Invasive Devices     Peripheral Intravenous Line            Peripheral IV 02/19/21 Distal;Right;Ventral (anterior) Forearm 3 days          Drain            Closed/Suction Drain Right RLQ Bulb 15 Fr  4 days    Colostomy LLQ 4 days                Physical Exam:   General appearance: alert and oriented, in no acute distress  Head: Normocephalic, without obvious abnormality, atraumatic, sclerae anicteric, mucous membranes moist  Neck: no JVD and supple, symmetrical, trachea midline  Lungs: clear to auscultation, no wheezes or rales  Heart:   Regular rate and rhythm, S1-S2 normal, no murmur  Abdomen:   Flat, soft, mild tenderness over incision site, active bowel sounds  Ostomy viable with soft brown stool  Extremities:   1+ edema bilateral lower extremities, no redness or tenderness in the calves or thighs  Skin: Warm, dry; incision sites clean, dry intact  There is mild erythema at the umbilicus and tenderness, no drainage or induration  Nursing notes and vital signs reviewed      Lab, Imaging and other studies:  I have personally reviewed pertinent lab results    , CBC:   Lab Results   Component Value Date    WBC 10 19 (H) 02/23/2021    HGB 10 1 (L) 02/23/2021    HCT 31 8 (L) 02/23/2021    MCV 92 02/23/2021     02/23/2021    MCH 29 3 02/23/2021    MCHC 31 8 02/23/2021    RDW 13 6 02/23/2021    MPV 8 7 (L) 02/23/2021   , CMP:   Lab Results   Component Value Date    SODIUM 135 (L) 02/23/2021    K 3 2 (L) 02/23/2021     02/23/2021    CO2 26 02/23/2021    BUN 13 02/23/2021    CREATININE 0 83 02/23/2021    CALCIUM 8 6 02/23/2021    EGFR 70 02/23/2021     VTE Pharmacologic Prophylaxis: Heparin  VTE Mechanical Prophylaxis: sequential compression device     Giuseppe Mario PA-C

## 2021-03-01 ENCOUNTER — TELEPHONE (OUTPATIENT)
Dept: OTHER | Facility: OTHER | Age: 76
End: 2021-03-01

## 2021-03-01 ENCOUNTER — OFFICE VISIT (OUTPATIENT)
Dept: SURGERY | Facility: HOSPITAL | Age: 76
End: 2021-03-01

## 2021-03-01 VITALS
BODY MASS INDEX: 29.57 KG/M2 | RESPIRATION RATE: 16 BRPM | DIASTOLIC BLOOD PRESSURE: 73 MMHG | HEIGHT: 60 IN | HEART RATE: 58 BPM | WEIGHT: 150.6 LBS | SYSTOLIC BLOOD PRESSURE: 134 MMHG | TEMPERATURE: 97.2 F

## 2021-03-01 DIAGNOSIS — Z93.3 COLOSTOMY STATUS (HCC): Primary | ICD-10-CM

## 2021-03-01 DIAGNOSIS — Z98.890 POST-OPERATIVE STATE: ICD-10-CM

## 2021-03-01 DIAGNOSIS — Z71.89 ENCOUNTER FOR OSTOMY CARE EDUCATION: ICD-10-CM

## 2021-03-01 PROCEDURE — 99024 POSTOP FOLLOW-UP VISIT: CPT | Performed by: PHYSICIAN ASSISTANT

## 2021-03-01 NOTE — TELEPHONE ENCOUNTER
Needs referral to a wound specialist that can look at ostomy wound, said the place that layla the PA sent the referral to does not look at ostomy wound

## 2021-03-01 NOTE — PATIENT INSTRUCTIONS
Your recovering well following her surgery  No indication for further antibiotics  Continue diet as tolerated  Follow-up with [address] [place] [address] as scheduled for ostomy teaching and care  Continue VNA as provided  Follow-up with your gastroenterologist to schedule colonoscopy in approximately 8 weeks  Follow up in our office following your colonoscopy to schedule colostomy reversal   Please call with any further questions, concerns, or issues with colostomy

## 2021-03-01 NOTE — PROGRESS NOTES
Assessment/Plan:   Robb Soto is a 70 y  o female who comes in today for postoperative check after Mery's procedure with end colostomy done for perforated diverticulitis  Patient recovered well during her hospitalization  Her bowel function was slow to recover  She was able to be discharged home on postoperative day 5  She was discharged with surgical drain and incisional staples  She was set up for VNA care at her daughter's home in [facility]  She was discharged on oral antibiotics  Patient and daughter returned today with complaints of difficulty with colostomy site  States that colostomy bag has been leaking daily and that patient's skin is severely irritated  Otherwise patient has been eating well  She has had good stool output  She is getting stronger daily  She has not had any fevers or chills  She is not requiring pain medication  She completed her course of antibiotics  Her surgical drain is putting out approximately 10 mL daily  She has had only 1 visiting nurse visit since discharge and is very frustrated with the leakage from her colostomy bag  On exam  Patient's abdomen is benign  Her incision site is intact with staples in place  Her drain is in place with minimal drainage  Her ostomy is viable with some mucosal sloughing  Colostomy appears to have pulled away from the wall slightly on the bottom edge  Skin is erythematous and excoriated surrounding the entire colostomy site  Surrounding skin is painful to touch  No   Purulent drainage or induration  No signs of skin infection    Incisional staples and surgical drain were removed intact and without difficulty in the office today  At this point I have referred patient to a wound care center near the daughters home  for ostomy evaluation and assistance with care  The patient has been scheduled for an appointment tomorrow  She should continue to follow with the [address] [place] [address] Road as needed   She should continue the visiting nurse service as provided  The patient should follow up with her  gastroenterologist to be scheduled for a colonoscopy in approximately 8 weeks  Patient should return to the office after colonoscopy to schedule colostomy reversal    Plan discussed in detail with patient and her daughter  All questions answered  The patient or daughter will call with any further questions or concerns or further ostomy issues  Pathology:  Sigmoid colon, sigmoidectomy   mural abscess and perforation present in the setting of diverticulosis   diverticula are present at resection margins   3 benign lymph nodes (0/3)    Follow-up: 10-12 weeks to schedule colostomy reversal    HPI:  Xin Nye is a 70 y  o female who comes in today for postoperative check after recent   Hospitalization for perforated sigmoid diverticulitis status post Mery's procedure with end colostomy  As above  Patient complains of difficulty with colostomy  States bag is leaking constantly and is being changed multiple times a day  States her skin is raw and painful due to colostomy leakage  States incision site is intact and drain is only putting out minimal output  Otherwise she is eating well and having good stool output from her colostomy  She has not had any fevers or chills  She did complete her course of oral antibiotics  She is still fatigued but getting stronger    She is following lifting and activity restrictions      ROS:  General ROS: negative for - chills, fatigue, fever or night sweats, weight loss  Respiratory ROS: no cough, shortness of breath, or wheezing  Cardiovascular ROS: no chest pain or dyspnea on exertion  Abdomen ROS: as per HPI  Genito-Urinary ROS: no dysuria, trouble voiding, or hematuria  Musculoskeletal ROS: negative for - gait disturbance, joint pain or muscle pain  Neurological ROS: no TIA or stroke symptoms  Skin ROS: surgical incision, colostomny    ALLERGIES  Sulfa antibiotics    Current Outpatient Medications:     acetaminophen (TYLENOL) 500 mg tablet, Take 500 mg by mouth every 6 (six) hours as needed for mild pain, Disp: , Rfl:     amLODIPine (NORVASC) 5 mg tablet, Take 5 mg by mouth daily , Disp: , Rfl:     amoxicillin-clavulanate (AUGMENTIN) 875-125 mg per tablet, Take 1 tablet by mouth every 12 (twelve) hours for 7 days, Disp: 14 tablet, Rfl: 0    atenolol-chlorthalidone (TENORETIC) 100-25 mg per tablet, Take 1 tablet by mouth daily , Disp: , Rfl:     gabapentin (NEURONTIN) 300 mg capsule, 2 (two) times a day, Disp: , Rfl:     losartan (COZAAR) 100 MG tablet, Take 100 mg by mouth daily, Disp: , Rfl:     simvastatin (ZOCOR) 20 mg tablet, Take 20 mg by mouth daily at bedtime , Disp: , Rfl:     zolpidem (AMBIEN) 10 mg tablet, Take 1/2 tablet BY MOUTH ONCE A DAY AT BEDTIME, Disp: , Rfl:     omeprazole (PriLOSEC OTC) 20 MG tablet, Take 20 mg by mouth daily as needed, Disp: , Rfl:   Past Medical History:   Diagnosis Date    Allergic rhinitis     DDD (degenerative disc disease), lumbar     Diverticulitis large intestine w/o perforation or abscess w/o bleeding     Hemorrhoids     Hernia of abdominal cavity 03/18/2020    History of gallstones     Hyperlipidemia     Hypertension     Lyme disease 1988     Past Surgical History:   Procedure Laterality Date    BAND HEMORRHOIDECTOMY  07/08/2020    CHOLECYSTECTOMY      COLONOSCOPY      COLOSTOMY Left 2/18/2021    Procedure: COLOSTOMY END creation;  Surgeon: Mohan Henry MD;  Location: [facility];  Service: General    FINGER SURGERY      RIGHT SECOND FINGER-"TRIGGER FINGER"    LAPAROTOMY N/A 2/18/2021    Procedure: LAPAROTOMY EXPLORATORY;  Surgeon: Mohan Henry MD;  Location: [facility];  Service: General    PILONIDAL CYST EXCISION      TX LAP,DIAGNOSTIC ABDOMEN N/A 2/18/2021    Procedure: LAPAROSCOPY DIAGNOSTIC;  Surgeon: Mohan Henry MD;  Location: [facility];  Service: Adonay WOODY LAP,INGUINAL HERNIA REPR,INITIAL Right 7/14/2020    Procedure: REPAIR HERNIA INGUINAL and femoral hernia, LAPAROSCOPIC;  Surgeon: Danae Coronado MD;  Location: [facility];  Service: General    OR PART REMOVAL COLON W ANASTOMOSIS N/A 2/18/2021    Procedure: RESECTION COLON SIGMOID;  Surgeon: Amy Serra MD;  Location: [facility];  Service: General    SHOULDER SURGERY Right      Family History   Problem Relation Age of Onset    Coronary artery disease Mother     Hypertension Mother     Hypotension Father     Emphysema Father     Prostate cancer Father       reports that she quit smoking about 51 years ago  She has a 2 50 pack-year smoking history  She has never used smokeless tobacco  She reports previous alcohol use  She reports previous drug use  PHYSICAL EXAM    Vitals:    03/01/21 1110   BP: 134/73   Pulse: 58   Resp: 16   Temp: (!) 97 2 °F (36 2 °C)       General: normal, cooperative, no distress, appears well  Abdominal: soft, non distended, active BS   incision site is intact with staples in place, surgical drain in place with minimal serous output   ostomy is viable with some sloughing mucosa,  Bottom edge appears to have  slightly from skin,  There is leakage of stool from lateral colostomy site  There is significant erythema and excoriation of skin  Surrounding the colostomy site  Area is very tender to touch  No significant drainage, induration,  or purulence from wound site  Incision: clean, dry, and intact and healing well    surgical staples and abdominal drain removed intact and without difficulty    Patient tolerated procedure well    Gage Dutton PA-C

## 2021-03-02 ENCOUNTER — TELEPHONE (OUTPATIENT)
Dept: SURGERY | Facility: CLINIC | Age: 76
End: 2021-03-02

## 2021-03-02 NOTE — TELEPHONE ENCOUNTER
Patient called with concern for possible UTI  Having burning and noted some blood  Most likely not related to recent surgery  Per Sierra JAD Mario, patient to contact PCP and have a urine culture done  Patient verbalized understanding

## 2021-03-03 DIAGNOSIS — Z23 ENCOUNTER FOR IMMUNIZATION: ICD-10-CM

## 2021-03-09 ENCOUNTER — OFFICE VISIT (OUTPATIENT)
Dept: SURGERY | Facility: CLINIC | Age: 76
End: 2021-03-09

## 2021-03-09 VITALS
HEART RATE: 63 BPM | WEIGHT: 148 LBS | HEIGHT: 60 IN | BODY MASS INDEX: 29.06 KG/M2 | DIASTOLIC BLOOD PRESSURE: 68 MMHG | SYSTOLIC BLOOD PRESSURE: 122 MMHG | TEMPERATURE: 98.5 F

## 2021-03-09 DIAGNOSIS — Z98.890 POST-OPERATIVE STATE: Primary | ICD-10-CM

## 2021-03-09 DIAGNOSIS — K57.92 DIVERTICULITIS: ICD-10-CM

## 2021-03-09 PROCEDURE — 99024 POSTOP FOLLOW-UP VISIT: CPT | Performed by: SURGERY

## 2021-03-09 RX ORDER — CIPROFLOXACIN 500 MG/1
TABLET, FILM COATED ORAL
COMMUNITY
Start: 2021-03-03 | End: 2021-05-14 | Stop reason: HOSPADM

## 2021-03-09 NOTE — PROGRESS NOTES
Assessment/Plan:   status post Mery's procedure  She will have colonoscopy followed by reversal,  Initially she was planning on having colonoscopy with her GI doctor but she will call general surgery in [place] to see if this can be arranged to performed in conjunction with her reversal surgery as to minimize the amount of bowel prep given her stoma issues  She was advised to call with any questions  No problem-specific Assessment & Plan notes found for this encounter  Diagnoses and all orders for this visit:    Post-operative state    Diverticulitis    Other orders  -     ciprofloxacin (CIPRO) 500 mg tablet          Subjective:      Patient ID: Adiel Lawson is a 70 y o  female  She presents for her postop visit status post diagnostic laparoscopy with conversion to open sigmoid resection and colostomy  She has been having some issues with her stoma as the bag had leakage issues in the 1st 2 weeks and the stoma is located on area of her pannus  She has been seen by a [address] who assisted with stoma appliance issues  She would for to have her reversal done in [facility] as this is where she lives and is close to her friends  The following portions of the patient's history were reviewed and updated as appropriate:   She  has a past medical history of Allergic rhinitis, DDD (degenerative disc disease), lumbar, Diverticulitis large intestine w/o perforation or abscess w/o bleeding, Hemorrhoids, Hernia of abdominal cavity (03/18/2020), History of gallstones, Hyperlipidemia, Hypertension, and Lyme disease (1988)  She   Patient Active Problem List    Diagnosis Date Noted    Hypertension     DDD (degenerative disc disease), lumbar     Hyperlipidemia     Non-recurrent unilateral inguinal hernia without obstruction or gangrene      She  has a past surgical history that includes Shoulder surgery (Right);  Cholecystectomy; PILONIDAL CYST EXCISION; Finger surgery; Band hemorrhoidectomy (07/08/2020); Colonoscopy; pr lap,inguinal hernia repr,initial (Right, 7/14/2020); pr lap,diagnostic abdomen (N/A, 2/18/2021); pr part removal colon w anastomosis (N/A, 2/18/2021); LAPAROTOMY (N/A, 2/18/2021); and Colostomy (Left, 2/18/2021)  Her family history includes Coronary artery disease in her mother; Emphysema in her father; Hypertension in her mother; Hypotension in her father; Prostate cancer in her father  She  reports that she quit smoking about 51 years ago  She has a 2 50 pack-year smoking history  She has never used smokeless tobacco  She reports previous alcohol use  She reports previous drug use  Current Outpatient Medications   Medication Sig Dispense Refill    amLODIPine (NORVASC) 5 mg tablet Take 5 mg by mouth daily       atenolol-chlorthalidone (TENORETIC) 100-25 mg per tablet Take 1 tablet by mouth daily       ciprofloxacin (CIPRO) 500 mg tablet       gabapentin (NEURONTIN) 300 mg capsule 2 (two) times a day      losartan (COZAAR) 100 MG tablet Take 100 mg by mouth daily      omeprazole (PriLOSEC OTC) 20 MG tablet Take 20 mg by mouth daily as needed      simvastatin (ZOCOR) 20 mg tablet Take 20 mg by mouth daily at bedtime       zolpidem (AMBIEN) 10 mg tablet Take 1/2 tablet BY MOUTH ONCE A DAY AT BEDTIME      acetaminophen (TYLENOL) 500 mg tablet Take 500 mg by mouth every 6 (six) hours as needed for mild pain       No current facility-administered medications for this visit  She is allergic to pollen extract and sulfa antibiotics       Review of Systems   All other systems reviewed and are negative          Objective:      /68 (BP Location: Left arm, Patient Position: Sitting, Cuff Size: Adult)   Pulse 63   Temp 98 5 °F (36 9 °C) (Tympanic)   Ht 5' (1 524 m)   Wt 67 1 kg (148 lb)   BMI 28 90 kg/m²          Physical Exam  Abdominal:

## 2021-03-18 RX ORDER — GABAPENTIN 300 MG/1
300 CAPSULE ORAL ONCE
Status: CANCELLED | OUTPATIENT
Start: 2021-05-12 | End: 2021-05-12

## 2021-03-18 RX ORDER — CHLORHEXIDINE GLUCONATE 0.12 MG/ML
15 RINSE ORAL ONCE
Status: CANCELLED | OUTPATIENT
Start: 2021-05-12 | End: 2021-05-12

## 2021-03-18 RX ORDER — SODIUM CHLORIDE, SODIUM LACTATE, POTASSIUM CHLORIDE, CALCIUM CHLORIDE 600; 310; 30; 20 MG/100ML; MG/100ML; MG/100ML; MG/100ML
125 INJECTION, SOLUTION INTRAVENOUS CONTINUOUS
Status: CANCELLED | OUTPATIENT
Start: 2021-05-12

## 2021-03-18 RX ORDER — CEFAZOLIN SODIUM 1 G/50ML
1000 SOLUTION INTRAVENOUS ONCE
Status: CANCELLED | OUTPATIENT
Start: 2021-05-12

## 2021-03-18 RX ORDER — ACETAMINOPHEN 325 MG/1
975 TABLET ORAL ONCE
Status: CANCELLED | OUTPATIENT
Start: 2021-05-12 | End: 2021-05-12

## 2021-03-19 ENCOUNTER — OFFICE VISIT (OUTPATIENT)
Dept: SURGERY | Facility: HOSPITAL | Age: 76
End: 2021-03-19

## 2021-03-19 VITALS
WEIGHT: 147.2 LBS | DIASTOLIC BLOOD PRESSURE: 72 MMHG | SYSTOLIC BLOOD PRESSURE: 143 MMHG | BODY MASS INDEX: 28.9 KG/M2 | TEMPERATURE: 97.6 F | HEIGHT: 60 IN | HEART RATE: 58 BPM

## 2021-03-19 DIAGNOSIS — Z93.3 COLOSTOMY STATUS (HCC): Primary | ICD-10-CM

## 2021-03-19 PROCEDURE — 99024 POSTOP FOLLOW-UP VISIT: CPT | Performed by: PHYSICIAN ASSISTANT

## 2021-03-19 NOTE — PATIENT INSTRUCTIONS
Colostomy was site evaluated in office today  There is some granulation tissue at the upper medial edge that was treated with silver nitrate  This may be contributing to downward pointing colostomy  Will likely need to be re-treated within the next 1-2 weeks  Area treated with silver nitrate will slough off  Skin surrounding colostomy site is much improved  Continue skin protection and ostomy bag changes as needed  No signs of active infection  Please call with any questions or concerns

## 2021-03-19 NOTE — PROGRESS NOTES
Assessment/Plan:   Ronald Espinal is a 70 y  o female who comes in today for postoperative check after   Mery's procedure with end colostomy done on 2/18/21 for acute sigmoid colon diverticulitis with perforation and mural abscess  Patient was initially seen 2 weeks ago with complaints of difficulty with her ostomy site  She was referred to wound care and has been working with visiting nurses  Today she is doing much better but continues to complain of some thickening at upper part of her colostomy and some stool leakage at the nferior edge of colostomy bag  Otherwise she is eating well  Her weight is stable  No fevers or chills  Colostomy status  -today colostomy site is much improved  There is a significant improvement in edema  However there was an area of granulation tissue at the upper edge possibly contributing to downward turn of colostomy and difficulty with leaking at inferior edge   -granulation tissue was treated with silver nitrate in the office today  Will likely need repeat treatment in 1-2 weeks  - colostomy is viable with brown soft stool output  - surrounding skin maceration and excoriation is significantly improved  There is still mild irritation at the inferior edge-  -no signs of active infection  -continue wound care follow-up and VNA care  -re-evaluate in 2 weeks  -plan for colonoscopy and reversal in approximately 8 weeks  -call with questions or concerns        HPI:  Ronald Espinal is a 70 y  o female who comes in today for   Re-evaluation following Mery's procedure done for perforated diverticulitis  She was initially having significant difficulty with her colostomy site  She had  Difficulty with placing the ostomy bag and continued leakage  She was referred to wound care  She continues to be seen by visiting nurses  She states she is doing much better  Skin irritation is significantly improved    Complains of her colostomy pointing downward and still some mild irritation at the inferior edge of her colostomy site  Otherwise she is feeling well  She is eating well  She denies any fevers or chills  Her weight is stable  She is anxious to have her colostomy reversed        ROS:  General ROS: negative for - chills, fatigue, fever or night sweats, weight loss  Respiratory ROS: no cough, shortness of breath, or wheezing  Cardiovascular ROS: no chest pain or dyspnea on exertion  Abdomen ROS: as per HPI  Genito-Urinary ROS: no dysuria, trouble voiding, or hematuria  Musculoskeletal ROS: negative for - gait disturbance, joint pain or muscle pain  Neurological ROS: no TIA or stroke symptoms  Skin ROS:as per HPI    ALLERGIES  Pollen extract and Sulfa antibiotics    Current Outpatient Medications:     acetaminophen (TYLENOL) 500 mg tablet, Take 500 mg by mouth every 6 (six) hours as needed for mild pain, Disp: , Rfl:     amLODIPine (NORVASC) 5 mg tablet, Take 5 mg by mouth daily , Disp: , Rfl:     atenolol-chlorthalidone (TENORETIC) 100-25 mg per tablet, Take 1 tablet by mouth daily , Disp: , Rfl:     ciprofloxacin (CIPRO) 500 mg tablet, , Disp: , Rfl:     gabapentin (NEURONTIN) 300 mg capsule, 2 (two) times a day, Disp: , Rfl:     losartan (COZAAR) 100 MG tablet, Take 100 mg by mouth daily, Disp: , Rfl:     omeprazole (PriLOSEC OTC) 20 MG tablet, Take 20 mg by mouth daily as needed, Disp: , Rfl:     simvastatin (ZOCOR) 20 mg tablet, Take 20 mg by mouth daily at bedtime , Disp: , Rfl:     zolpidem (AMBIEN) 10 mg tablet, Take 1/2 tablet BY MOUTH ONCE A DAY AT BEDTIME, Disp: , Rfl:   Past Medical History:   Diagnosis Date    Allergic rhinitis     DDD (degenerative disc disease), lumbar     Diverticulitis large intestine w/o perforation or abscess w/o bleeding     Hemorrhoids     Hernia of abdominal cavity 03/18/2020    History of gallstones     Hyperlipidemia     Hypertension     Lyme disease 1988     Past Surgical History:   Procedure Laterality Date    BAND HEMORRHOIDECTOMY  07/08/2020    CHOLECYSTECTOMY      COLONOSCOPY      COLOSTOMY Left 2/18/2021    Procedure: COLOSTOMY END creation;  Surgeon: Ira Vee MD;  [facility];  Service: General    FINGER SURGERY      RIGHT SECOND FINGER-"TRIGGER FINGER"    LAPAROTOMY N/A 2/18/2021    Procedure: LAPAROTOMY EXPLORATORY;  Surgeon: Ira Vee MD;  [facility];  Service: General    PILONIDAL CYST EXCISION      [place] LAP,DIAGNOSTIC ABDOMEN N/A 2/18/2021    Procedure: LAPAROSCOPY DIAGNOSTIC;  Surgeon: Ira Vee MD;  [facility];  Service: General    MO Alisa Frey 19 Right 7/14/2020    Procedure: REPAIR HERNIA INGUINAL and femoral hernia, LAPAROSCOPIC;  Surgeon: Bri Martins MD;  [facility];  Service: General    MO PART REMOVAL COLON W ANASTOMOSIS N/A 2/18/2021    Procedure: RESECTION COLON SIGMOID;  Surgeon: Ira Vee MD;  [facility];  Service: General    SHOULDER SURGERY Right      Family History   Problem Relation Age of Onset    Coronary artery disease Mother     Hypertension Mother     Hypotension Father     Emphysema Father     Prostate cancer Father       reports that she quit smoking about 51 years ago  She has a 2 50 pack-year smoking history  She has never used smokeless tobacco  She reports previous alcohol use  She reports previous drug use  PHYSICAL EXAM    Vitals:    03/19/21 1030   BP: 143/72   Pulse: 58   Temp: 97 6 °F (36 4 °C)       General: normal, cooperative, no distress  Abdominal: soft, nondistended or non tender, active BS  Incision: clean, dry,   Intact   colostomy site with mild erythema skin irritation at the inferior edge otherwise much improved  There is some granulation tissue at the upper site  No purulence  No signs of active infection      Gurinder Rodriguez PA-C

## 2021-03-29 ENCOUNTER — OFFICE VISIT (OUTPATIENT)
Dept: SURGERY | Facility: HOSPITAL | Age: 76
End: 2021-03-29

## 2021-03-29 VITALS
WEIGHT: 148 LBS | SYSTOLIC BLOOD PRESSURE: 144 MMHG | HEIGHT: 60 IN | DIASTOLIC BLOOD PRESSURE: 65 MMHG | HEART RATE: 51 BPM | BODY MASS INDEX: 29.06 KG/M2

## 2021-03-29 DIAGNOSIS — Z93.3 COLOSTOMY STATUS (HCC): Primary | ICD-10-CM

## 2021-03-29 PROCEDURE — 99024 POSTOP FOLLOW-UP VISIT: CPT | Performed by: PHYSICIAN ASSISTANT

## 2021-03-29 NOTE — PATIENT INSTRUCTIONS
Colostomy is greatly improved  Surrounding skin is fully healed and intact  Granulation tissue is almost fully treated  Continue routine ostomy care  Continue high-protein diet and activity as tolerated  Return in few weeks for re-evaluation for colostomy reversal   Discontinue colonoscopy as scheduled  Call with any questions or concerns

## 2021-03-29 NOTE — PROGRESS NOTES
Assessment/Plan:   Sohail Galdamez is a 70 y  o female who comes in today for re-evaluation of ostomy site following Mery's procedure with end colostomy done on 02/18/2021 for acute sigmoid diverticulitis with perforation and mural abscess  Patient has had difficulty with wounds related to her ostomy site  At last visit she was noted to have significant granulation tissue at superior edge of ostomy contributing to downward turning of ostomy  Granulation tissue was treated with silver nitrate at last visit  Colostomy status  - patient continues to do well  She is eating well  Ostomy is functioning well   -  Skin surrounding ostomy site has fully healed  Granulation tissue has significantly improved  There is still a small amount remaining at the superior edge which was again  treated with silver nitrate today  -continue routine ostomy care  - continue follow-up appointment in a few weeks to schedule colostomy reversal  - colonoscopy as scheduled prior to reversal  - call with any questions or concerns      HPI:  Sohail Galdamez is a 70 y  o female who comes in today for postoperative check  Mery's procedure with end colostomy  Patient is now almost 6 weeks postoperative  She has had issues with skin breakdown related her colostomy site  States her skin is now fully healed  Granulation tissue has greatly improved  There still a small amount remaining  She is now able to keep her colostomy bag in place for days without changing it  She is otherwise eating well  Her ostomy is functioning well  Her weight is stable      ROS:  General ROS: negative for - chills, fatigue, fever or night sweats, weight loss  Respiratory ROS: no cough, shortness of breath, or wheezing  Cardiovascular ROS: no chest pain or dyspnea on exertion  Abdomen ROS: no pain, N/V  Genito-Urinary ROS: no dysuria, trouble voiding, or hematuria  Musculoskeletal ROS: negative for - gait disturbance, joint pain or muscle pain  Neurological ROS: no TIA or stroke symptoms  Skin ROS: as per HPI    ALLERGIES  Pollen extract and Sulfa antibiotics    Current Outpatient Medications:     acetaminophen (TYLENOL) 500 mg tablet, Take 500 mg by mouth every 6 (six) hours as needed for mild pain, Disp: , Rfl:     amLODIPine (NORVASC) 5 mg tablet, Take 5 mg by mouth daily , Disp: , Rfl:     atenolol-chlorthalidone (TENORETIC) 100-25 mg per tablet, Take 1 tablet by mouth daily , Disp: , Rfl:     ciprofloxacin (CIPRO) 500 mg tablet, , Disp: , Rfl:     gabapentin (NEURONTIN) 300 mg capsule, 2 (two) times a day, Disp: , Rfl:     losartan (COZAAR) 100 MG tablet, Take 100 mg by mouth daily, Disp: , Rfl:     omeprazole (PriLOSEC OTC) 20 MG tablet, Take 20 mg by mouth daily as needed, Disp: , Rfl:     simvastatin (ZOCOR) 20 mg tablet, Take 20 mg by mouth daily at bedtime , Disp: , Rfl:     zolpidem (AMBIEN) 10 mg tablet, Take 1/2 tablet BY MOUTH ONCE A DAY AT BEDTIME, Disp: , Rfl:   Past Medical History:   Diagnosis Date    Allergic rhinitis     DDD (degenerative disc disease), lumbar     Diverticulitis large intestine w/o perforation or abscess w/o bleeding     Hemorrhoids     Hernia of abdominal cavity 03/18/2020    History of gallstones     Hyperlipidemia     Hypertension     Lyme disease 1988     Past Surgical History:   Procedure Laterality Date    BAND HEMORRHOIDECTOMY  07/08/2020    CHOLECYSTECTOMY      COLONOSCOPY      COLOSTOMY Left 2/18/2021    Procedure: COLOSTOMY END creation;  Surgeon: Doretha Espinal MD;  [facility];  Service: General    FINGER SURGERY      RIGHT SECOND FINGER-"TRIGGER FINGER"    LAPAROTOMY N/A 2/18/2021    Procedure: LAPAROTOMY EXPLORATORY;  Surgeon: Doretha Espinal MD;  [facility];  Service: General    PILONIDAL CYST EXCISION      [place] LAP,DIAGNOSTIC ABDOMEN N/A 2/18/2021    Procedure: LAPAROSCOPY DIAGNOSTIC;  Surgeon: Doretha Espinal MD;  [facility];  Service: General    [place] LAP,INGUINAL HERNIA REPR,INITIAL Right 7/14/2020    Procedure: REPAIR HERNIA INGUINAL and femoral hernia, LAPAROSCOPIC;  Surgeon: Armin Cardenas MD;  [facility];  Service: General    [place] PART REMOVAL COLON W ANASTOMOSIS N/A 2/18/2021    Procedure: RESECTION COLON SIGMOID;  Surgeon: Car Cohen MD;  [facility];  Service: General    SHOULDER SURGERY Right      Family History   Problem Relation Age of Onset    Coronary artery disease Mother     Hypertension Mother     Hypotension Father     Emphysema Father     Prostate cancer Father       reports that she quit smoking about 51 years ago  She has a 2 50 pack-year smoking history  She has never used smokeless tobacco  She reports previous alcohol use  She reports previous drug use  PHYSICAL EXAM    Vitals:    03/29/21 1101   BP: 144/65   Pulse: (!) 51     Weight (last 2 days)     Date/Time   Weight    03/29/21 1101   67 1 (148)                General: normal, cooperative, no distress  Abdominal: soft, nondistended or nontender    Colostomy is pink and viable and functioning well with soft brown stool   granulation tissue greatly improved  Still a small amount remaining at upper edge with minimal bleeding     Surrounding skin is intact without breakdown  Incision: clean, dry, and intact and healing well    Sven Mario PA-C

## 2021-04-09 ENCOUNTER — ANESTHESIA EVENT (OUTPATIENT)
Dept: PERIOP | Facility: HOSPITAL | Age: 76
DRG: 336 | End: 2021-04-09
Payer: COMMERCIAL

## 2021-04-09 DIAGNOSIS — D64.9 ANEMIA, UNSPECIFIED TYPE: Primary | ICD-10-CM

## 2021-04-23 ENCOUNTER — OFFICE VISIT (OUTPATIENT)
Dept: SURGERY | Facility: HOSPITAL | Age: 76
End: 2021-04-23
Payer: COMMERCIAL

## 2021-04-23 VITALS
HEIGHT: 60 IN | HEART RATE: 57 BPM | TEMPERATURE: 96.1 F | BODY MASS INDEX: 29.88 KG/M2 | WEIGHT: 152.2 LBS | DIASTOLIC BLOOD PRESSURE: 69 MMHG | SYSTOLIC BLOOD PRESSURE: 137 MMHG | RESPIRATION RATE: 16 BRPM

## 2021-04-23 DIAGNOSIS — Z93.3 COLOSTOMY STATUS (HCC): Primary | ICD-10-CM

## 2021-04-23 PROCEDURE — 99024 POSTOP FOLLOW-UP VISIT: CPT | Performed by: PHYSICIAN ASSISTANT

## 2021-04-23 NOTE — PATIENT INSTRUCTIONS
Continue plan for colonoscopy and surgery as scheduled  Please call with any questions or concerns prior to surgery  Complete bowel prep and pre-admission testing as instructed  I read to her the result for the glucose test was within normal limits, so she passed the test.  Li Perla RN-[facility]

## 2021-04-23 NOTE — PROGRESS NOTES
Assessment/Plan:   Talia Juarez is a 70 y  o female who is here yo schedule surgery  Colostomy status  - secondary to  Mery's procedure with end colostomy performed for sigmoid colon diverticulitis on 2/18/21  -patient has recovered well  - plan to undergo preoperative colonoscopy with Gastroenterology on 05/11/2021  - plan for colostomy reversal at [address] on 05/12/2021  - bowel prep instructions provided by Gastroenterology  Patient will continue only clear liquids after colonoscopy  - preoperative and postoperative instructions reviewed with patient and daughter  All questions answered  - procedure discussed in detail as well as possible risks and complications and written consent has been obtained  - will require pre-admission testing blood work,  EKG  Completed during last hospitalization  - no medical clearance required    HTN, HLD  - well controlled per patient  - continue home meds  - no medical clearance required      HPI:  Talia Juarez is a 70 y  o female who  Returns to schedule colostomy reversal as above  Patient had Mery's procedure with end colostomy completed for perforated diverticulitis in February  She initially had some difficulty with colostomy site with skin wounds in granulation tissue  This is now resolved  Patient is tolerating a regular diet with good colostomy function  Her weight has been stable  She denies any  Abdominal pain  No fevers or chills        ROS:  General ROS: negative  negative for - chills, fatigue, fever or night sweats, weight loss  Respiratory ROS: no cough, shortness of breath, or wheezing  Cardiovascular ROS: no chest pain or dyspnea on exertion  Abdomen ROS: as per HPI  Genito-Urinary ROS: no dysuria, trouble voiding, or hematuria  Musculoskeletal ROS: negative for - gait disturbance, joint pain or muscle pain  Neurological ROS: no TIA or stroke symptoms  Skin ROS: no rashes, lesions, open wounds    ALLERGIES  Pollen extract and Sulfa antibiotics    Current Outpatient Medications:     acetaminophen (TYLENOL) 500 mg tablet, Take 500 mg by mouth every 6 (six) hours as needed for mild pain, Disp: , Rfl:     amLODIPine (NORVASC) 5 mg tablet, Take 5 mg by mouth daily , Disp: , Rfl:     atenolol-chlorthalidone (TENORETIC) 100-25 mg per tablet, Take 1 tablet by mouth daily , Disp: , Rfl:     gabapentin (NEURONTIN) 300 mg capsule, 2 (two) times a day, Disp: , Rfl:     losartan (COZAAR) 100 MG tablet, Take 100 mg by mouth daily, Disp: , Rfl:     simvastatin (ZOCOR) 20 mg tablet, Take 20 mg by mouth daily at bedtime , Disp: , Rfl:     zolpidem (AMBIEN) 10 mg tablet, Take 1/2 tablet BY MOUTH ONCE A DAY AT BEDTIME, Disp: , Rfl:     ciprofloxacin (CIPRO) 500 mg tablet, , Disp: , Rfl:     omeprazole (PriLOSEC OTC) 20 MG tablet, Take 20 mg by mouth daily as needed, Disp: , Rfl:   Past Medical History:   Diagnosis Date    Allergic rhinitis     DDD (degenerative disc disease), lumbar     Diverticulitis large intestine w/o perforation or abscess w/o bleeding     Hemorrhoids     Hernia of abdominal cavity 03/18/2020    History of gallstones     Hyperlipidemia     Hypertension     Lyme disease 1988     Past Surgical History:   Procedure Laterality Date    BAND HEMORRHOIDECTOMY  07/08/2020    CHOLECYSTECTOMY      COLONOSCOPY      COLOSTOMY Left 2/18/2021    Procedure: COLOSTOMY END creation;  Surgeon: Kit Clark MD;  [facility];  Service: General    FINGER SURGERY      RIGHT SECOND FINGER-"TRIGGER FINGER"    LAPAROTOMY N/A 2/18/2021    Procedure: LAPAROTOMY EXPLORATORY;  Surgeon: Kit Clark MD;  [facility];  Service: General    PILONIDAL CYST EXCISION      [place] LAP,DIAGNOSTIC ABDOMEN N/A 2/18/2021    Procedure: LAPAROSCOPY DIAGNOSTIC;  Surgeon: Kit Clark MD;  [facility];  Service: General    LA Alisa Frey 19 Right 7/14/2020    Procedure: REPAIR HERNIA INGUINAL and femoral hernia, LAPAROSCOPIC;  Surgeon: Mandeep Ag MD;  [facility];  Service: General    [place] PART REMOVAL COLON W ANASTOMOSIS N/A 2/18/2021    Procedure: RESECTION COLON SIGMOID;  Surgeon: Nelson Rosenthal MD;  [facility];  Service: General    SHOULDER SURGERY Right      Family History   Problem Relation Age of Onset    Coronary artery disease Mother     Hypertension Mother     Hypotension Father     Emphysema Father     Prostate cancer Father       reports that she quit smoking about 51 years ago  She has a 2 50 pack-year smoking history  She has never used smokeless tobacco  She reports previous alcohol use  She reports previous drug use      Labs:   Lab Results   Component Value Date    WBC 10 19 (H) 02/23/2021    HGB 10 1 (L) 02/23/2021    HCT 31 8 (L) 02/23/2021    MCV 92 02/23/2021     02/23/2021     Lab Results   Component Value Date    SODIUM 135 (L) 02/23/2021    K 3 2 (L) 02/23/2021     02/23/2021    CO2 26 02/23/2021    AGAP 9 02/23/2021    BUN 13 02/23/2021    CREATININE 0 83 02/23/2021    GLUC 109 02/23/2021    GLUF 97 06/16/2020    CALCIUM 8 6 02/23/2021    AST 21 02/18/2021    ALT 25 02/18/2021    ALKPHOS 77 02/18/2021    TP 7 3 02/18/2021    TBILI 1 30 (H) 02/18/2021    EGFR 70 02/23/2021         PHYSICAL EXAM    Vitals:    04/23/21 1042   BP: 137/69   Pulse: 57   Resp: 16   Temp: (!) 96 1 °F (35 6 °C)       Weight (last 2 days)     Date/Time   Weight    04/23/21 1042   69 (152 2)              General Appearance:    Alert, cooperative, no distress, pleasant   Head:    Normocephalic without obvious abnormality   Eyes:    Conjunctiva/corneas clear, EOM's intact        Neck:   Supple, no adenopathy, no JVD   Back:     Symmetric, no spinal or CVA tenderness   Lungs:     Clear to auscultation bilaterally, no wheezing or rhonchi   Heart:    Regular rate and rhythm, S1 and S2 normal, no murmur   Abdomen:      Mildly obese, soft, nontender, active bowel sounds viable colostomy with soft brown stool   Extremities:   Extremities normal  No clubbing, cyanosis or edema   Psych:   Normal Affect, AOx3  Neurologic:  Skin:   CNII-XII intact   Strength symmetric, speech intact    Warm, dry, intact, no visible rashes or lesions         Gabby Mario PA-C

## 2021-04-23 NOTE — H&P (VIEW-ONLY)
Assessment/Plan:   Milka Marin is a 70 y  o female who is here yo schedule surgery  Colostomy status  - secondary to  Mery's procedure with end colostomy performed for sigmoid colon diverticulitis on 2/18/21  -patient has recovered well  - plan to undergo preoperative colonoscopy with Gastroenterology on 05/11/2021  - plan for colostomy reversal at [address] on 05/12/2021  - bowel prep instructions provided by Gastroenterology  Patient will continue only clear liquids after colonoscopy  - preoperative and postoperative instructions reviewed with patient and daughter  All questions answered  - procedure discussed in detail as well as possible risks and complications and written consent has been obtained  - will require pre-admission testing blood work,  EKG  Completed during last hospitalization  - no medical clearance required    HTN, HLD  - well controlled per patient  - continue home meds  - no medical clearance required      HPI:  Milka Marin is a 70 y  o female who  Returns to schedule colostomy reversal as above  Patient had Mery's procedure with end colostomy completed for perforated diverticulitis in February  She initially had some difficulty with colostomy site with skin wounds in granulation tissue  This is now resolved  Patient is tolerating a regular diet with good colostomy function  Her weight has been stable  She denies any  Abdominal pain  No fevers or chills        ROS:  General ROS: negative  negative for - chills, fatigue, fever or night sweats, weight loss  Respiratory ROS: no cough, shortness of breath, or wheezing  Cardiovascular ROS: no chest pain or dyspnea on exertion  Abdomen ROS: as per HPI  Genito-Urinary ROS: no dysuria, trouble voiding, or hematuria  Musculoskeletal ROS: negative for - gait disturbance, joint pain or muscle pain  Neurological ROS: no TIA or stroke symptoms  Skin ROS: no rashes, lesions, open wounds    ALLERGIES  Pollen extract and Sulfa antibiotics    Current Outpatient Medications:     acetaminophen (TYLENOL) 500 mg tablet, Take 500 mg by mouth every 6 (six) hours as needed for mild pain, Disp: , Rfl:     amLODIPine (NORVASC) 5 mg tablet, Take 5 mg by mouth daily , Disp: , Rfl:     atenolol-chlorthalidone (TENORETIC) 100-25 mg per tablet, Take 1 tablet by mouth daily , Disp: , Rfl:     gabapentin (NEURONTIN) 300 mg capsule, 2 (two) times a day, Disp: , Rfl:     losartan (COZAAR) 100 MG tablet, Take 100 mg by mouth daily, Disp: , Rfl:     simvastatin (ZOCOR) 20 mg tablet, Take 20 mg by mouth daily at bedtime , Disp: , Rfl:     zolpidem (AMBIEN) 10 mg tablet, Take 1/2 tablet BY MOUTH ONCE A DAY AT BEDTIME, Disp: , Rfl:     ciprofloxacin (CIPRO) 500 mg tablet, , Disp: , Rfl:     omeprazole (PriLOSEC OTC) 20 MG tablet, Take 20 mg by mouth daily as needed, Disp: , Rfl:   Past Medical History:   Diagnosis Date    Allergic rhinitis     DDD (degenerative disc disease), lumbar     Diverticulitis large intestine w/o perforation or abscess w/o bleeding     Hemorrhoids     Hernia of abdominal cavity 03/18/2020    History of gallstones     Hyperlipidemia     Hypertension     Lyme disease 1988     Past Surgical History:   Procedure Laterality Date    BAND HEMORRHOIDECTOMY  07/08/2020    CHOLECYSTECTOMY      COLONOSCOPY      COLOSTOMY Left 2/18/2021    Procedure: COLOSTOMY END creation;  Surgeon: Lo Wheatley MD;  [facility];  Service: General    FINGER SURGERY      RIGHT SECOND FINGER-"TRIGGER FINGER"    LAPAROTOMY N/A 2/18/2021    Procedure: LAPAROTOMY EXPLORATORY;  Surgeon: Lo Wheatley MD;  [facility];  Service: General    PILONIDAL CYST EXCISION      [place] LAP,DIAGNOSTIC ABDOMEN N/A 2/18/2021    Procedure: LAPAROSCOPY DIAGNOSTIC;  Surgeon: Lo Wheatley MD;  [facility];  Service: General    IA Alisa Frey 19 Right 7/14/2020    Procedure: REPAIR HERNIA INGUINAL and femoral hernia, LAPAROSCOPIC;  Surgeon: Dulce Mayer MD;  [facility];  Service: General    [place] PART REMOVAL COLON W ANASTOMOSIS N/A 2/18/2021    Procedure: RESECTION COLON SIGMOID;  Surgeon: Brennon Diaz MD;  [facility];  Service: General    SHOULDER SURGERY Right      Family History   Problem Relation Age of Onset    Coronary artery disease Mother     Hypertension Mother     Hypotension Father     Emphysema Father     Prostate cancer Father       reports that she quit smoking about 51 years ago  She has a 2 50 pack-year smoking history  She has never used smokeless tobacco  She reports previous alcohol use  She reports previous drug use      Labs:   Lab Results   Component Value Date    WBC 10 19 (H) 02/23/2021    HGB 10 1 (L) 02/23/2021    HCT 31 8 (L) 02/23/2021    MCV 92 02/23/2021     02/23/2021     Lab Results   Component Value Date    SODIUM 135 (L) 02/23/2021    K 3 2 (L) 02/23/2021     02/23/2021    CO2 26 02/23/2021    AGAP 9 02/23/2021    BUN 13 02/23/2021    CREATININE 0 83 02/23/2021    GLUC 109 02/23/2021    GLUF 97 06/16/2020    CALCIUM 8 6 02/23/2021    AST 21 02/18/2021    ALT 25 02/18/2021    ALKPHOS 77 02/18/2021    TP 7 3 02/18/2021    TBILI 1 30 (H) 02/18/2021    EGFR 70 02/23/2021         PHYSICAL EXAM    Vitals:    04/23/21 1042   BP: 137/69   Pulse: 57   Resp: 16   Temp: (!) 96 1 °F (35 6 °C)       Weight (last 2 days)     Date/Time   Weight    04/23/21 1042   69 (152 2)              General Appearance:    Alert, cooperative, no distress, pleasant   Head:    Normocephalic without obvious abnormality   Eyes:    Conjunctiva/corneas clear, EOM's intact        Neck:   Supple, no adenopathy, no JVD   Back:     Symmetric, no spinal or CVA tenderness   Lungs:     Clear to auscultation bilaterally, no wheezing or rhonchi   Heart:    Regular rate and rhythm, S1 and S2 normal, no murmur   Abdomen:      Mildly obese, soft, nontender, active bowel sounds viable colostomy with soft brown stool   Extremities:   Extremities normal  No clubbing, cyanosis or edema   Psych:   Normal Affect, AOx3  Neurologic:  Skin:   CNII-XII intact   Strength symmetric, speech intact    Warm, dry, intact, no visible rashes or lesions         Tobey Lefort Astl-Reimer, PA-C

## 2021-04-29 LAB
ABO GROUP BLD: NORMAL
APTT PPP: 25 SEC (ref 24–33)
BASOPHILS # BLD AUTO: 0.1 X10E3/UL (ref 0–0.2)
BASOPHILS NFR BLD AUTO: 1 %
BUN SERPL-MCNC: 24 MG/DL (ref 8–27)
BUN/CREAT SERPL: 20 (ref 12–28)
CALCIUM SERPL-MCNC: 10 MG/DL (ref 8.7–10.3)
CHLORIDE SERPL-SCNC: 100 MMOL/L (ref 96–106)
CO2 SERPL-SCNC: 26 MMOL/L (ref 20–29)
CREAT SERPL-MCNC: 1.2 MG/DL (ref 0.57–1)
EOSINOPHIL # BLD AUTO: 0.3 X10E3/UL (ref 0–0.4)
EOSINOPHIL NFR BLD AUTO: 6 %
ERYTHROCYTE [DISTWIDTH] IN BLOOD BY AUTOMATED COUNT: 12.9 % (ref 11.7–15.4)
EST. AVERAGE GLUCOSE BLD GHB EST-MCNC: 117 MG/DL
GLUCOSE SERPL-MCNC: 110 MG/DL (ref 65–99)
HBA1C MFR BLD: 5.7 % (ref 4.8–5.6)
HCT VFR BLD AUTO: 35.9 % (ref 34–46.6)
HGB BLD-MCNC: 11.7 G/DL (ref 11.1–15.9)
IMM GRANULOCYTES # BLD: 0 X10E3/UL (ref 0–0.1)
IMM GRANULOCYTES NFR BLD: 0 %
INR PPP: 1 (ref 0.9–1.2)
LYMPHOCYTES # BLD AUTO: 1.5 X10E3/UL (ref 0.7–3.1)
LYMPHOCYTES NFR BLD AUTO: 26 %
MCH RBC QN AUTO: 28.9 PG (ref 26.6–33)
MCHC RBC AUTO-ENTMCNC: 32.6 G/DL (ref 31.5–35.7)
MCV RBC AUTO: 89 FL (ref 79–97)
MONOCYTES # BLD AUTO: 0.5 X10E3/UL (ref 0.1–0.9)
MONOCYTES NFR BLD AUTO: 9 %
NEUTROPHILS # BLD AUTO: 3.3 X10E3/UL (ref 1.4–7)
NEUTROPHILS NFR BLD AUTO: 58 %
PLATELET # BLD AUTO: 272 X10E3/UL (ref 150–450)
POTASSIUM SERPL-SCNC: 4.1 MMOL/L (ref 3.5–5.2)
PROTHROMBIN TIME: 10.9 SEC (ref 9.1–12)
RBC # BLD AUTO: 4.05 X10E6/UL (ref 3.77–5.28)
RH BLD: POSITIVE
SL AMB EGFR AFRICAN AMERICAN: 52 ML/MIN/1.73
SL AMB EGFR NON AFRICAN AMERICAN: 45 ML/MIN/1.73
SODIUM SERPL-SCNC: 140 MMOL/L (ref 134–144)
WBC # BLD AUTO: 5.8 X10E3/UL (ref 3.4–10.8)

## 2021-05-04 RX ORDER — NEOMYCIN SULFATE 500 MG/1
1000 TABLET ORAL
COMMUNITY
End: 2021-05-14 | Stop reason: HOSPADM

## 2021-05-04 RX ORDER — ERYTHROMYCIN 250 MG/1
250 TABLET, COATED ORAL
COMMUNITY
End: 2021-05-14 | Stop reason: HOSPADM

## 2021-05-04 RX ORDER — CEPHALEXIN 500 MG/1
500 CAPSULE ORAL 3 TIMES DAILY
COMMUNITY
End: 2021-05-14 | Stop reason: HOSPADM

## 2021-05-04 NOTE — PRE-PROCEDURE INSTRUCTIONS
Pre-Surgery Instructions:   Medication Instructions    acetaminophen (TYLENOL) 500 mg tablet Uses as needed - may use morning of surgery if needed with sip of water    amLODIPine (NORVASC) 5 mg tablet Take morning of surgery with sip of water    atenolol-chlorthalidone (TENORETIC) 100-25 mg per tablet Take morning of surgery with sip of water    cephalexin (KEFLEX) 500 mg capsule Currently taking - will be completed on 5/6/21 prior to surgery    erythromycin base 250 mg tablet Patient was instructed by Physician and understands   gabapentin (NEURONTIN) 300 mg capsule Take morning of surgery with sip of water    losartan (COZAAR) 100 MG tablet HOLD morning of surgery with sip of water    neomycin (MYCIFRADIN) 500 mg tablet Patient was instructed by Physician and understands   Probiotic Product (PROBIOTIC DAILY PO) Taking currently with ATB therapy-will complete on 5/6/21 prior to surgery    simvastatin (ZOCOR) 20 mg tablet Take night before surgery     zolpidem (AMBIEN) 10 mg tablet Take night before surgery      Reviewed all medications and instructions for DOS  Reviewed all showering instructions and COVID visitation policy  Pt aware that  [facility] is location for DOS, instructed that pt pre op nurse will call on 5/11/21 to give specific instructions for DOS  Pt instructed to bring photo ID and insurance card for DOS, remove all jewelry and  NO valuables for DOS  Pt instructed to use only Tylenol between now 5/4/21 and DOS, NO NSAID products  Pt informed transport is needed for DOS due to receiving anesthesia  Pt verbalized understanding of all instructions given and reviewed for DOS  My Surgical Experience    The following information was developed to assist you to prepare for your operation  What do I need to do before coming to the hospital?   Arrange for a responsible person to drive you to and from the hospital    Arrange care for your children at home   Children are not allowed in the recovery areas of the hospital   Plan to wear clothing that is easy to put on and take off  If you are having shoulder surgery, wear a shirt that buttons or zippers in the front  Bathing  o Shower the evening before and the morning of your surgery with an antibacterial soap  Please refer to the Pre Op Showering Instructions for Surgery Patients Sheet   o Remove nail polish and all body piercing jewelry  o Do not shave any body part for at least 24 hours before surgery-this includes face, arms, legs and upper body  Food  o Nothing to eat or drink after midnight the night before your surgery  This includes candy and chewing gum  o Exception: If your surgery is after 12:00pm (noon), you may have clear liquids such as 7-Up®, ginger ale, apple or cranberry juice, Jell-O®, water, or clear broth until 8:00 am  o Do not drink milk or juice with pulp on the morning before surgery  o Do not drink alcohol 24 hours before surgery  Medicine  o Follow instructions you received from your surgeon about which medicines you may take on the day of surgery  o If instructed to take medicine on the morning of surgery, take pills with just a small sip of water  Call your prescribing doctor for specific infroamtion on what to do if you take insulin    What should I bring to the hospital?    Bring:  Porter Alcocer or a walker, if you have them, for foot or knee surgery   A list of the daily medicines, vitamins, minerals, herbals and nutritional supplements you take   Include the dosages of medicines and the time you take them each day   Glasses, dentures or hearing aids   Minimal clothing; you will be wearing hospital sleepwear   Photo ID; required to verify your identity   If you have a Living Will or Power of , bring a copy of the documents   If you have an ostomy, bring an extra pouch and any supplies you use    Do not bring   Medicines or inhalers   Money, valuables or jewelry    What other information should I know about the day of surgery?  Notify your surgeons if you develop a cold, sore throat, cough, fever, rash or any other illness   Report to the Ambulatory Surgical/Same Day Surgery Unit   You will be instructed to stop at Registration only if you have not been pre-registered   Inform your  fi they do not stay that they will be asked by the staff to leave a phone number where they can be reached   Be available to be reached before surgery  In the event the operating room schedule changes, you may be asked to come in earlier or later than expected    *It is important to tell your doctor and others involved in your health care if you are taking or have been taking any non-prescription drugs, vitamins, minerals, herbals or other nutritional supplements   Any of these may interact with some food or medicines and cause a reaction

## 2021-05-07 ENCOUNTER — APPOINTMENT (OUTPATIENT)
Dept: LAB | Facility: HOSPITAL | Age: 76
End: 2021-05-07
Payer: COMMERCIAL

## 2021-05-07 DIAGNOSIS — Z01.818 PRE-OP TESTING: ICD-10-CM

## 2021-05-07 LAB
ANION GAP SERPL CALCULATED.3IONS-SCNC: 9 MMOL/L (ref 4–13)
BUN SERPL-MCNC: 14 MG/DL (ref 5–25)
CALCIUM SERPL-MCNC: 9.6 MG/DL (ref 8.3–10.1)
CHLORIDE SERPL-SCNC: 102 MMOL/L (ref 100–108)
CO2 SERPL-SCNC: 28 MMOL/L (ref 21–32)
CREAT SERPL-MCNC: 0.94 MG/DL (ref 0.6–1.3)
GFR SERPL CREATININE-BSD FRML MDRD: 60 ML/MIN/1.73SQ M
GLUCOSE SERPL-MCNC: 106 MG/DL (ref 65–140)
POTASSIUM SERPL-SCNC: 3.9 MMOL/L (ref 3.5–5.3)
SODIUM SERPL-SCNC: 139 MMOL/L (ref 136–145)

## 2021-05-07 PROCEDURE — 36415 COLL VENOUS BLD VENIPUNCTURE: CPT

## 2021-05-07 PROCEDURE — 80048 BASIC METABOLIC PNL TOTAL CA: CPT

## 2021-05-12 ENCOUNTER — ANESTHESIA (OUTPATIENT)
Dept: PERIOP | Facility: HOSPITAL | Age: 76
DRG: 336 | End: 2021-05-12
Payer: COMMERCIAL

## 2021-05-12 ENCOUNTER — HOSPITAL ENCOUNTER (INPATIENT)
Facility: HOSPITAL | Age: 76
LOS: 2 days | Discharge: HOME | DRG: 336 | End: 2021-05-14
Attending: SURGERY | Admitting: SURGERY
Payer: COMMERCIAL

## 2021-05-12 DIAGNOSIS — N28.9 RENAL INSUFFICIENCY: ICD-10-CM

## 2021-05-12 DIAGNOSIS — Z93.3 COLOSTOMY STATUS (HCC): ICD-10-CM

## 2021-05-12 DIAGNOSIS — Z01.818 PRE-OP TESTING: ICD-10-CM

## 2021-05-12 DIAGNOSIS — D64.9 ANEMIA, UNSPECIFIED TYPE: Primary | ICD-10-CM

## 2021-05-12 DIAGNOSIS — Z98.890 HISTORY OF COLOSTOMY REVERSAL: ICD-10-CM

## 2021-05-12 LAB
ABO GROUP BLD: NORMAL
BLD GP AB SCN SERPL QL: NEGATIVE
RH BLD: POSITIVE
SPECIMEN EXPIRATION DATE: NORMAL

## 2021-05-12 PROCEDURE — 0DN84ZZ RELEASE SMALL INTESTINE, PERCUTANEOUS ENDOSCOPIC APPROACH: ICD-10-PCS | Performed by: SURGERY

## 2021-05-12 PROCEDURE — 86900 BLOOD TYPING SEROLOGIC ABO: CPT | Performed by: NURSE PRACTITIONER

## 2021-05-12 PROCEDURE — 86901 BLOOD TYPING SEROLOGIC RH(D): CPT | Performed by: NURSE PRACTITIONER

## 2021-05-12 PROCEDURE — 0DSM4ZZ REPOSITION DESCENDING COLON, PERCUTANEOUS ENDOSCOPIC APPROACH: ICD-10-PCS | Performed by: SURGERY

## 2021-05-12 PROCEDURE — 0DJD8ZZ INSPECTION OF LOWER INTESTINAL TRACT, VIA NATURAL OR ARTIFICIAL OPENING ENDOSCOPIC: ICD-10-PCS | Performed by: SURGERY

## 2021-05-12 PROCEDURE — 88307 TISSUE EXAM BY PATHOLOGIST: CPT | Performed by: PATHOLOGY

## 2021-05-12 PROCEDURE — 44227 LAP CLOSE ENTEROSTOMY: CPT | Performed by: PHYSICIAN ASSISTANT

## 2021-05-12 PROCEDURE — 0DBP7ZZ EXCISION OF RECTUM, VIA NATURAL OR ARTIFICIAL OPENING: ICD-10-PCS | Performed by: SURGERY

## 2021-05-12 PROCEDURE — C9290 INJ, BUPIVACAINE LIPOSOME: HCPCS | Performed by: SURGERY

## 2021-05-12 PROCEDURE — 86850 RBC ANTIBODY SCREEN: CPT | Performed by: NURSE PRACTITIONER

## 2021-05-12 PROCEDURE — C9113 INJ PANTOPRAZOLE SODIUM, VIA: HCPCS | Performed by: PHYSICIAN ASSISTANT

## 2021-05-12 PROCEDURE — 44227 LAP CLOSE ENTEROSTOMY: CPT | Performed by: SURGERY

## 2021-05-12 RX ORDER — HYDROMORPHONE HCL/PF 1 MG/ML
0.5 SYRINGE (ML) INJECTION EVERY 2 HOUR PRN
Status: DISCONTINUED | OUTPATIENT
Start: 2021-05-12 | End: 2021-05-14 | Stop reason: HOSPADM

## 2021-05-12 RX ORDER — CEFAZOLIN SODIUM 1 G/50ML
1000 SOLUTION INTRAVENOUS ONCE
Status: COMPLETED | OUTPATIENT
Start: 2021-05-12 | End: 2021-05-12

## 2021-05-12 RX ORDER — ONDANSETRON 2 MG/ML
INJECTION INTRAMUSCULAR; INTRAVENOUS AS NEEDED
Status: DISCONTINUED | OUTPATIENT
Start: 2021-05-12 | End: 2021-05-12

## 2021-05-12 RX ORDER — PROPOFOL 10 MG/ML
INJECTION, EMULSION INTRAVENOUS AS NEEDED
Status: DISCONTINUED | OUTPATIENT
Start: 2021-05-12 | End: 2021-05-12

## 2021-05-12 RX ORDER — AMLODIPINE BESYLATE 5 MG/1
5 TABLET ORAL DAILY
Status: DISCONTINUED | OUTPATIENT
Start: 2021-05-13 | End: 2021-05-14 | Stop reason: HOSPADM

## 2021-05-12 RX ORDER — SODIUM CHLORIDE, SODIUM LACTATE, POTASSIUM CHLORIDE, CALCIUM CHLORIDE 600; 310; 30; 20 MG/100ML; MG/100ML; MG/100ML; MG/100ML
125 INJECTION, SOLUTION INTRAVENOUS CONTINUOUS
Status: DISCONTINUED | OUTPATIENT
Start: 2021-05-12 | End: 2021-05-12

## 2021-05-12 RX ORDER — ROCURONIUM BROMIDE 10 MG/ML
INJECTION, SOLUTION INTRAVENOUS AS NEEDED
Status: DISCONTINUED | OUTPATIENT
Start: 2021-05-12 | End: 2021-05-12

## 2021-05-12 RX ORDER — GLYCOPYRROLATE 0.2 MG/ML
INJECTION INTRAMUSCULAR; INTRAVENOUS AS NEEDED
Status: DISCONTINUED | OUTPATIENT
Start: 2021-05-12 | End: 2021-05-12

## 2021-05-12 RX ORDER — ACETAMINOPHEN 325 MG/1
975 TABLET ORAL ONCE
Status: COMPLETED | OUTPATIENT
Start: 2021-05-12 | End: 2021-05-12

## 2021-05-12 RX ORDER — MIDAZOLAM HYDROCHLORIDE 2 MG/2ML
INJECTION, SOLUTION INTRAMUSCULAR; INTRAVENOUS AS NEEDED
Status: DISCONTINUED | OUTPATIENT
Start: 2021-05-12 | End: 2021-05-12

## 2021-05-12 RX ORDER — LIDOCAINE HYDROCHLORIDE 10 MG/ML
INJECTION, SOLUTION EPIDURAL; INFILTRATION; INTRACAUDAL; PERINEURAL AS NEEDED
Status: DISCONTINUED | OUTPATIENT
Start: 2021-05-12 | End: 2021-05-12

## 2021-05-12 RX ORDER — LOSARTAN POTASSIUM 50 MG/1
100 TABLET ORAL DAILY
Status: DISCONTINUED | OUTPATIENT
Start: 2021-05-13 | End: 2021-05-12

## 2021-05-12 RX ORDER — NEOSTIGMINE METHYLSULFATE 1 MG/ML
INJECTION INTRAVENOUS AS NEEDED
Status: DISCONTINUED | OUTPATIENT
Start: 2021-05-12 | End: 2021-05-12

## 2021-05-12 RX ORDER — KETOROLAC TROMETHAMINE 30 MG/ML
INJECTION, SOLUTION INTRAMUSCULAR; INTRAVENOUS AS NEEDED
Status: DISCONTINUED | OUTPATIENT
Start: 2021-05-12 | End: 2021-05-12

## 2021-05-12 RX ORDER — FENTANYL CITRATE 50 UG/ML
INJECTION, SOLUTION INTRAMUSCULAR; INTRAVENOUS AS NEEDED
Status: DISCONTINUED | OUTPATIENT
Start: 2021-05-12 | End: 2021-05-12

## 2021-05-12 RX ORDER — METOCLOPRAMIDE HYDROCHLORIDE 5 MG/ML
10 INJECTION INTRAMUSCULAR; INTRAVENOUS ONCE AS NEEDED
Status: DISCONTINUED | OUTPATIENT
Start: 2021-05-12 | End: 2021-05-12

## 2021-05-12 RX ORDER — OXYCODONE HYDROCHLORIDE 5 MG/1
5 TABLET ORAL EVERY 4 HOURS PRN
Status: DISCONTINUED | OUTPATIENT
Start: 2021-05-12 | End: 2021-05-14 | Stop reason: HOSPADM

## 2021-05-12 RX ORDER — ATENOLOL 50 MG/1
100 TABLET ORAL DAILY
Status: DISCONTINUED | OUTPATIENT
Start: 2021-05-13 | End: 2021-05-14 | Stop reason: HOSPADM

## 2021-05-12 RX ORDER — EPHEDRINE SULFATE 50 MG/ML
INJECTION INTRAVENOUS AS NEEDED
Status: DISCONTINUED | OUTPATIENT
Start: 2021-05-12 | End: 2021-05-12

## 2021-05-12 RX ORDER — HYDROMORPHONE HCL 110MG/55ML
PATIENT CONTROLLED ANALGESIA SYRINGE INTRAVENOUS AS NEEDED
Status: DISCONTINUED | OUTPATIENT
Start: 2021-05-12 | End: 2021-05-12

## 2021-05-12 RX ORDER — ONDANSETRON 2 MG/ML
4 INJECTION INTRAMUSCULAR; INTRAVENOUS EVERY 6 HOURS PRN
Status: DISCONTINUED | OUTPATIENT
Start: 2021-05-12 | End: 2021-05-14 | Stop reason: HOSPADM

## 2021-05-12 RX ORDER — CHLORHEXIDINE GLUCONATE 0.12 MG/ML
15 RINSE ORAL ONCE
Status: COMPLETED | OUTPATIENT
Start: 2021-05-12 | End: 2021-05-12

## 2021-05-12 RX ORDER — GABAPENTIN 300 MG/1
300 CAPSULE ORAL ONCE
Status: COMPLETED | OUTPATIENT
Start: 2021-05-12 | End: 2021-05-12

## 2021-05-12 RX ORDER — GABAPENTIN 300 MG/1
300 CAPSULE ORAL 3 TIMES DAILY
Status: DISCONTINUED | OUTPATIENT
Start: 2021-05-12 | End: 2021-05-14 | Stop reason: HOSPADM

## 2021-05-12 RX ORDER — ACETAMINOPHEN 325 MG/1
975 TABLET ORAL EVERY 6 HOURS SCHEDULED
Status: DISCONTINUED | OUTPATIENT
Start: 2021-05-12 | End: 2021-05-14 | Stop reason: HOSPADM

## 2021-05-12 RX ORDER — OXYCODONE HYDROCHLORIDE 5 MG/1
10 TABLET ORAL EVERY 4 HOURS PRN
Status: DISCONTINUED | OUTPATIENT
Start: 2021-05-12 | End: 2021-05-14 | Stop reason: HOSPADM

## 2021-05-12 RX ORDER — FENTANYL CITRATE/PF 50 MCG/ML
25 SYRINGE (ML) INJECTION
Status: DISCONTINUED | OUTPATIENT
Start: 2021-05-12 | End: 2021-05-12

## 2021-05-12 RX ORDER — AMLODIPINE BESYLATE 5 MG/1
5 TABLET ORAL DAILY
Status: DISCONTINUED | OUTPATIENT
Start: 2021-05-13 | End: 2021-05-12

## 2021-05-12 RX ORDER — SODIUM CHLORIDE 9 MG/ML
75 INJECTION, SOLUTION INTRAVENOUS CONTINUOUS
Status: DISCONTINUED | OUTPATIENT
Start: 2021-05-12 | End: 2021-05-13

## 2021-05-12 RX ORDER — PANTOPRAZOLE SODIUM 40 MG/1
40 INJECTION, POWDER, FOR SOLUTION INTRAVENOUS DAILY
Status: DISCONTINUED | OUTPATIENT
Start: 2021-05-12 | End: 2021-05-14 | Stop reason: HOSPADM

## 2021-05-12 RX ORDER — HEPARIN SODIUM 5000 [USP'U]/ML
5000 INJECTION, SOLUTION INTRAVENOUS; SUBCUTANEOUS EVERY 8 HOURS SCHEDULED
Status: DISCONTINUED | OUTPATIENT
Start: 2021-05-12 | End: 2021-05-14 | Stop reason: HOSPADM

## 2021-05-12 RX ADMIN — MIDAZOLAM 2 MG: 1 INJECTION INTRAMUSCULAR; INTRAVENOUS at 07:25

## 2021-05-12 RX ADMIN — ONDANSETRON 4 MG: 2 INJECTION INTRAMUSCULAR; INTRAVENOUS at 07:25

## 2021-05-12 RX ADMIN — ROCURONIUM BROMIDE 10 MG: 10 INJECTION, SOLUTION INTRAVENOUS at 09:27

## 2021-05-12 RX ADMIN — GABAPENTIN 300 MG: 300 CAPSULE ORAL at 06:31

## 2021-05-12 RX ADMIN — EPHEDRINE SULFATE 10 MG: 50 INJECTION, SOLUTION INTRAVENOUS at 09:18

## 2021-05-12 RX ADMIN — EPHEDRINE SULFATE 10 MG: 50 INJECTION, SOLUTION INTRAVENOUS at 08:54

## 2021-05-12 RX ADMIN — PANTOPRAZOLE SODIUM 40 MG: 40 INJECTION, POWDER, FOR SOLUTION INTRAVENOUS at 14:36

## 2021-05-12 RX ADMIN — NEOSTIGMINE METHYLSULFATE 1 MG: 1 INJECTION, SOLUTION INTRAVENOUS at 11:17

## 2021-05-12 RX ADMIN — ACETAMINOPHEN 975 MG: 325 TABLET, FILM COATED ORAL at 06:31

## 2021-05-12 RX ADMIN — SODIUM CHLORIDE, SODIUM LACTATE, POTASSIUM CHLORIDE, AND CALCIUM CHLORIDE: .6; .31; .03; .02 INJECTION, SOLUTION INTRAVENOUS at 07:05

## 2021-05-12 RX ADMIN — PROPOFOL 150 MG: 10 INJECTION, EMULSION INTRAVENOUS at 07:35

## 2021-05-12 RX ADMIN — GABAPENTIN 300 MG: 300 CAPSULE ORAL at 14:36

## 2021-05-12 RX ADMIN — CEFAZOLIN SODIUM 1000 MG: 1 SOLUTION INTRAVENOUS at 07:25

## 2021-05-12 RX ADMIN — SODIUM CHLORIDE, SODIUM LACTATE, POTASSIUM CHLORIDE, AND CALCIUM CHLORIDE: .6; .31; .03; .02 INJECTION, SOLUTION INTRAVENOUS at 11:33

## 2021-05-12 RX ADMIN — METRONIDAZOLE 500 MG: 500 SOLUTION INTRAVENOUS at 07:30

## 2021-05-12 RX ADMIN — CHLORHEXIDINE GLUCONATE 0.12% ORAL RINSE 15 ML: 1.2 LIQUID ORAL at 06:31

## 2021-05-12 RX ADMIN — GLYCOPYRROLATE 0.5 MG: 0.2 INJECTION, SOLUTION INTRAMUSCULAR; INTRAVENOUS at 11:08

## 2021-05-12 RX ADMIN — HEPARIN SODIUM 5000 UNITS: 5000 INJECTION INTRAVENOUS; SUBCUTANEOUS at 21:31

## 2021-05-12 RX ADMIN — KETOROLAC TROMETHAMINE 15 MG: 30 INJECTION, SOLUTION INTRAMUSCULAR; INTRAVENOUS at 10:53

## 2021-05-12 RX ADMIN — CEFAZOLIN SODIUM 2000 MG: 1 SOLUTION INTRAVENOUS at 11:16

## 2021-05-12 RX ADMIN — OXYCODONE HYDROCHLORIDE 5 MG: 5 TABLET ORAL at 18:38

## 2021-05-12 RX ADMIN — SODIUM CHLORIDE 125 ML/HR: 0.9 INJECTION, SOLUTION INTRAVENOUS at 23:14

## 2021-05-12 RX ADMIN — LIDOCAINE HYDROCHLORIDE 30 MG: 10 INJECTION, SOLUTION EPIDURAL; INFILTRATION; INTRACAUDAL; PERINEURAL at 07:35

## 2021-05-12 RX ADMIN — ACETAMINOPHEN 975 MG: 325 TABLET, FILM COATED ORAL at 14:36

## 2021-05-12 RX ADMIN — OXYCODONE HYDROCHLORIDE 10 MG: 5 TABLET ORAL at 14:36

## 2021-05-12 RX ADMIN — GABAPENTIN 300 MG: 300 CAPSULE ORAL at 20:40

## 2021-05-12 RX ADMIN — SODIUM CHLORIDE, SODIUM LACTATE, POTASSIUM CHLORIDE, AND CALCIUM CHLORIDE 125 ML/HR: .6; .31; .03; .02 INJECTION, SOLUTION INTRAVENOUS at 06:34

## 2021-05-12 RX ADMIN — NEOSTIGMINE METHYLSULFATE 3 MG: 1 INJECTION, SOLUTION INTRAVENOUS at 11:08

## 2021-05-12 RX ADMIN — GLYCOPYRROLATE 0.2 MG: 0.2 INJECTION, SOLUTION INTRAMUSCULAR; INTRAVENOUS at 11:17

## 2021-05-12 RX ADMIN — SODIUM CHLORIDE 125 ML/HR: 0.9 INJECTION, SOLUTION INTRAVENOUS at 14:36

## 2021-05-12 RX ADMIN — ACETAMINOPHEN 975 MG: 325 TABLET, FILM COATED ORAL at 18:38

## 2021-05-12 RX ADMIN — FENTANYL CITRATE 100 MCG: 50 INJECTION, SOLUTION INTRAMUSCULAR; INTRAVENOUS at 07:31

## 2021-05-12 RX ADMIN — SODIUM CHLORIDE, SODIUM LACTATE, POTASSIUM CHLORIDE, AND CALCIUM CHLORIDE: .6; .31; .03; .02 INJECTION, SOLUTION INTRAVENOUS at 08:15

## 2021-05-12 RX ADMIN — HYDROMORPHONE HYDROCHLORIDE 0.25 MG: 2 INJECTION INTRAMUSCULAR; INTRAVENOUS; SUBCUTANEOUS at 11:12

## 2021-05-12 RX ADMIN — ROCURONIUM BROMIDE 50 MG: 10 INJECTION, SOLUTION INTRAVENOUS at 07:35

## 2021-05-12 NOTE — ANESTHESIA PREPROCEDURE EVALUATION
Procedure:  COLOSTOMY TAKEDOWN / REVERSE GOLDEN (N/A Abdomen)    Relevant Problems   CARDIO   (+) Hyperlipidemia   (+) Hypertension      MUSCULOSKELETAL   (+) DDD (degenerative disc disease), lumbar        Physical Exam    Airway    Mallampati score: II         Dental   Comment: Caps and implants,     Cardiovascular  Rhythm: regular, Rate: normal, Cardiovascular exam normal    Pulmonary  Pulmonary exam normal Breath sounds clear to auscultation,     Other Findings        Anesthesia Plan  ASA Score- 2     Anesthesia Type- general with ASA Monitors  Additional Monitors:   Airway Plan: ETT  Plan Factors-    Chart reviewed  Patient is not a current smoker  Induction- intravenous  Postoperative Plan- Plan for postoperative opioid use  Informed Consent- Anesthetic plan and risks discussed with patient  I personally reviewed this patient with the CRNA  Discussed and agreed on the Anesthesia Plan with the CRNA  Link Rene

## 2021-05-12 NOTE — INTERVAL H&P NOTE
H&P reviewed  After examining the patient I find no changes in the patients condition since the H&P had been written      Vitals:    05/12/21 0628   BP: 163/63   Pulse: 69   Resp: 16   Temp: 98 1 °F (36 7 °C)   SpO2: 100%

## 2021-05-12 NOTE — TREATMENT PLAN
Notes reviewed  Patient is s/p colostomy takedown/reverser lacy, laproscopic anastaomsis of colon, lysis of adhesions and intraop colonscopy, segmental rectal resection with takedown of splecic flexure    BP post op reviewed and stable  Baseline eGFR 60-70 with eGFR in 50s in February 5/7 creatinine 0 94    Medications reviewed  Hold losartan and chorthalidone for now  Avoid hypotension  Place hold parameters on atenolol and amlodipine of systolic of 497  Avoiding other nephrotoxic meds and medications appropriately dosed  Fluids and [place] per surgical post op course    Full consultation to follow

## 2021-05-12 NOTE — ANESTHESIA POSTPROCEDURE EVALUATION
Post-Op Assessment Note    CV Status:  Stable  Pain Score: 0    Pain management: adequate  Multimodal analgesia used between 6 hours prior to anesthesia start to PACU discharge    Mental Status:  Arousable and lethargic   Hydration Status:  Euvolemic   PONV Controlled:  None   Airway Patency:  Patent  Airway: intubated      Post Op Vitals Reviewed: Yes      Staff: CRNA         No complications documented      BP      Temp      Pulse     Resp      SpO2

## 2021-05-12 NOTE — INTERIM OP NOTE
COLOSTOMY TAKEDOWN / REVERSE GOLDEN, laparocopic anastomosis of colon, Extensive lysis of adhesions, intraop colonoscopy, Bilateral Tap block, Segmental rectum rection  Postoperative Note  PATIENT NAME: Mikel Torrez  : 1945  MRN: [id number]  [facility] OR ROOM 02    Surgery Date: 2021    Preop Diagnosis:  Colostomy status ([address] ) [Z93 3]    Post-Op Diagnosis Codes:     * Colostomy status ([address] ) [Z93 3]    Procedure(s) (LRB):  COLOSTOMY TAKEDOWN / REVERSE GOLDEN, laparocopic anastomosis of colon, Extensive lysis of adhesions, intraop colonoscopy, Bilateral Tap block, Segmental rectum rection (N/A), takedown of splenic flexure    Surgeon(s) and Role:     * Colleen Gordon MD - Primary     * Cristi Mario PA-C - Assisting  Radha SANDOVAL    Specimens:  ID Type Source Tests Collected by Time Destination   1 : colostomty stump, colon donuts, upper rectum Tissue Soft Tissue, Other TISSUE EXAM Colleen Gordon MD 2021 1036        Estimated Blood Loss:   Minimal    Anesthesia Type:   General ETA    Findings:    as above  Stricture of rectal stump, 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

## 2021-05-12 NOTE — PLAN OF CARE
Problem: PAIN - ADULT  Goal: Verbalizes/displays adequate comfort level or baseline comfort level  Description: Interventions:  - Encourage patient to monitor pain and request assistance  - Assess pain using appropriate pain scale  - Administer analgesics based on type and severity of pain and evaluate response  - Implement non-pharmacological measures as appropriate and evaluate response  - Consider cultural and social influences on pain and pain management  - Notify physician/advanced practitioner if interventions unsuccessful or patient reports new pain  Outcome: Progressing     Problem: INFECTION - ADULT  Goal: Absence or prevention of progression during hospitalization  Description: INTERVENTIONS:  - Assess and monitor for signs and symptoms of infection  - Monitor lab/diagnostic results  - Monitor all insertion sites, i e  indwelling lines, tubes, and drains  - Monitor endotracheal if appropriate and nasal secretions for changes in amount and color  - [place] appropriate cooling/warming therapies per order  - Administer medications as ordered  - Instruct and encourage patient and family to use good hand hygiene technique  - Identify and instruct in appropriate isolation precautions for identified infection/condition  Outcome: Progressing  Goal: Absence of fever/infection during neutropenic period  Description: INTERVENTIONS:  - Monitor WBC    Outcome: Progressing     Problem: SAFETY ADULT  Goal: Patient will remain free of falls  Description: INTERVENTIONS:  - Assess patient frequently for physical needs  -  Identify cognitive and physical deficits and behaviors that affect risk of falls    -  [place] fall precautions as indicated by assessment   - Educate patient/family on patient safety including physical limitations  - Instruct patient to call for assistance with activity based on assessment  - Modify environment to reduce risk of injury  - Consider OT/PT consult to assist with strengthening/mobility  Outcome: Progressing  Goal: Maintain or return to baseline ADL function  Description: INTERVENTIONS:  -  Assess patient's ability to carry out ADLs; assess patient's baseline for ADL function and identify physical deficits which impact ability to perform ADLs (bathing, care of mouth/teeth, toileting, grooming, dressing, etc )  - Assess/evaluate cause of self-care deficits   - Assess range of motion  - Assess patient's mobility; develop plan if impaired  - Assess patient's need for assistive devices and provide as appropriate  - Encourage maximum independence but intervene and supervise when necessary  - Involve family in performance of ADLs  - Assess for home care needs following discharge   - Consider OT consult to assist with ADL evaluation and planning for discharge  - Provide patient education as appropriate  Outcome: Progressing  Goal: Maintain or return mobility status to optimal level  Description: INTERVENTIONS:  - Assess patient's baseline mobility status (ambulation, transfers, stairs, etc )    - Identify cognitive and physical deficits and behaviors that affect mobility  - Identify mobility aids required to assist with transfers and/or ambulation (gait belt, sit-to-stand, lift, walker, cane, etc )  - [place] fall precautions as indicated by assessment  - Record patient progress and toleration of activity level on Mobility SBAR; progress patient to next Phase/Stage  - Instruct patient to call for assistance with activity based on assessment  - Consider rehabilitation consult to assist with strengthening/weightbearing, etc   Outcome: Progressing     Problem: DISCHARGE PLANNING  Goal: Discharge to home or other facility with appropriate resources  Description: INTERVENTIONS:  - Identify barriers to discharge w/patient and caregiver  - Arrange for needed discharge resources and transportation as appropriate  - Identify discharge learning needs (meds, wound care, etc )  - Arrange for interpretive services to assist at discharge as needed  - Refer to Case Management Department for coordinating discharge planning if the patient needs post-hospital services based on physician/advanced practitioner order or complex needs related to functional status, cognitive ability, or social support system  Outcome: Progressing     Problem: Knowledge Deficit  Goal: Patient/family/caregiver demonstrates understanding of disease process, treatment plan, medications, and discharge instructions  Description: Complete learning assessment and assess knowledge base    Interventions:  - Provide teaching at level of understanding  - Provide teaching via preferred learning methods  Outcome: Progressing     Problem: GASTROINTESTINAL - ADULT  Goal: Minimal or absence of nausea and/or vomiting  Description: INTERVENTIONS:  - Administer IV fluids if ordered to ensure adequate hydration  - Maintain NPO status until nausea and vomiting are resolved  - Nasogastric tube if ordered  - Administer ordered antiemetic medications as needed  - Provide nonpharmacologic comfort measures as appropriate  - Advance diet as tolerated, if ordered  - Consider nutrition services referral to assist patient with adequate nutrition and appropriate food choices  Outcome: Progressing  Goal: Maintains or returns to baseline bowel function  Description: INTERVENTIONS:  - Assess bowel function  - Encourage oral fluids to ensure adequate hydration  - Administer IV fluids if ordered to ensure adequate hydration  - Administer ordered medications as needed  - Encourage mobilization and activity  - Consider nutritional services referral to assist patient with adequate nutrition and appropriate food choices  Outcome: Progressing  Goal: Maintains adequate nutritional intake  Description: INTERVENTIONS:  - Monitor percentage of each meal consumed  - Identify factors contributing to decreased intake, treat as appropriate  - Assist with meals as needed  - Monitor I&O, weight, and lab values if indicated  - Obtain nutrition services referral as needed  Outcome: Progressing

## 2021-05-13 LAB
ALBUMIN SERPL BCP-MCNC: 3.2 G/DL (ref 3.5–5)
ALP SERPL-CCNC: 68 U/L (ref 46–116)
ALT SERPL W P-5'-P-CCNC: 19 U/L (ref 12–78)
ANION GAP SERPL CALCULATED.3IONS-SCNC: 11 MMOL/L (ref 4–13)
AST SERPL W P-5'-P-CCNC: 16 U/L (ref 5–45)
BASOPHILS # BLD AUTO: 0.02 THOUSANDS/ΜL (ref 0–0.1)
BASOPHILS NFR BLD AUTO: 0 % (ref 0–1)
BILIRUB SERPL-MCNC: 1 MG/DL (ref 0.2–1)
BUN SERPL-MCNC: 14 MG/DL (ref 5–25)
CALCIUM ALBUM COR SERPL-MCNC: 8.6 MG/DL (ref 8.3–10.1)
CALCIUM SERPL-MCNC: 8 MG/DL (ref 8.3–10.1)
CHLORIDE SERPL-SCNC: 99 MMOL/L (ref 100–108)
CO2 SERPL-SCNC: 23 MMOL/L (ref 21–32)
CREAT SERPL-MCNC: 1.08 MG/DL (ref 0.6–1.3)
EOSINOPHIL # BLD AUTO: 0.06 THOUSAND/ΜL (ref 0–0.61)
EOSINOPHIL NFR BLD AUTO: 1 % (ref 0–6)
ERYTHROCYTE [DISTWIDTH] IN BLOOD BY AUTOMATED COUNT: 12.7 % (ref 11.6–15.1)
GFR SERPL CREATININE-BSD FRML MDRD: 51 ML/MIN/1.73SQ M
GLUCOSE SERPL-MCNC: 109 MG/DL (ref 65–140)
HCT VFR BLD AUTO: 31.7 % (ref 34.8–46.1)
HGB BLD-MCNC: 10.5 G/DL (ref 11.5–15.4)
IMM GRANULOCYTES # BLD AUTO: 0.02 THOUSAND/UL (ref 0–0.2)
IMM GRANULOCYTES NFR BLD AUTO: 0 % (ref 0–2)
LYMPHOCYTES # BLD AUTO: 1.67 THOUSANDS/ΜL (ref 0.6–4.47)
LYMPHOCYTES NFR BLD AUTO: 21 % (ref 14–44)
MAGNESIUM SERPL-MCNC: 1.7 MG/DL (ref 1.6–2.6)
MCH RBC QN AUTO: 29.1 PG (ref 26.8–34.3)
MCHC RBC AUTO-ENTMCNC: 33.1 G/DL (ref 31.4–37.4)
MCV RBC AUTO: 88 FL (ref 82–98)
MONOCYTES # BLD AUTO: 0.65 THOUSAND/ΜL (ref 0.17–1.22)
MONOCYTES NFR BLD AUTO: 8 % (ref 4–12)
NEUTROPHILS # BLD AUTO: 5.64 THOUSANDS/ΜL (ref 1.85–7.62)
NEUTS SEG NFR BLD AUTO: 70 % (ref 43–75)
NRBC BLD AUTO-RTO: 0 /100 WBCS
PHOSPHATE SERPL-MCNC: 3.2 MG/DL (ref 2.3–4.1)
PLATELET # BLD AUTO: 197 THOUSANDS/UL (ref 149–390)
PMV BLD AUTO: 9.7 FL (ref 8.9–12.7)
POTASSIUM SERPL-SCNC: 2.9 MMOL/L (ref 3.5–5.3)
PROT SERPL-MCNC: 6.3 G/DL (ref 6.4–8.2)
RBC # BLD AUTO: 3.61 MILLION/UL (ref 3.81–5.12)
SODIUM SERPL-SCNC: 133 MMOL/L (ref 136–145)
WBC # BLD AUTO: 8.06 THOUSAND/UL (ref 4.31–10.16)

## 2021-05-13 PROCEDURE — 80053 COMPREHEN METABOLIC PANEL: CPT | Performed by: PHYSICIAN ASSISTANT

## 2021-05-13 PROCEDURE — 83735 ASSAY OF MAGNESIUM: CPT | Performed by: PHYSICIAN ASSISTANT

## 2021-05-13 PROCEDURE — C9113 INJ PANTOPRAZOLE SODIUM, VIA: HCPCS | Performed by: PHYSICIAN ASSISTANT

## 2021-05-13 PROCEDURE — 85025 COMPLETE CBC W/AUTO DIFF WBC: CPT | Performed by: PHYSICIAN ASSISTANT

## 2021-05-13 PROCEDURE — 84100 ASSAY OF PHOSPHORUS: CPT | Performed by: PHYSICIAN ASSISTANT

## 2021-05-13 PROCEDURE — 99024 POSTOP FOLLOW-UP VISIT: CPT | Performed by: PHYSICIAN ASSISTANT

## 2021-05-13 PROCEDURE — 99223 1ST HOSP IP/OBS HIGH 75: CPT | Performed by: INTERNAL MEDICINE

## 2021-05-13 RX ORDER — POTASSIUM CHLORIDE 14.9 MG/ML
20 INJECTION INTRAVENOUS
Status: COMPLETED | OUTPATIENT
Start: 2021-05-13 | End: 2021-05-13

## 2021-05-13 RX ORDER — POTASSIUM CHLORIDE 20 MEQ/1
40 TABLET, EXTENDED RELEASE ORAL ONCE
Status: COMPLETED | OUTPATIENT
Start: 2021-05-13 | End: 2021-05-13

## 2021-05-13 RX ADMIN — ATENOLOL 100 MG: 50 TABLET ORAL at 08:05

## 2021-05-13 RX ADMIN — SODIUM CHLORIDE 125 ML/HR: 0.9 INJECTION, SOLUTION INTRAVENOUS at 08:11

## 2021-05-13 RX ADMIN — HEPARIN SODIUM 5000 UNITS: 5000 INJECTION INTRAVENOUS; SUBCUTANEOUS at 05:15

## 2021-05-13 RX ADMIN — ACETAMINOPHEN 975 MG: 325 TABLET, FILM COATED ORAL at 17:27

## 2021-05-13 RX ADMIN — OXYCODONE HYDROCHLORIDE 5 MG: 5 TABLET ORAL at 01:49

## 2021-05-13 RX ADMIN — AMLODIPINE BESYLATE 5 MG: 5 TABLET ORAL at 08:05

## 2021-05-13 RX ADMIN — ACETAMINOPHEN 975 MG: 325 TABLET, FILM COATED ORAL at 00:08

## 2021-05-13 RX ADMIN — HEPARIN SODIUM 5000 UNITS: 5000 INJECTION INTRAVENOUS; SUBCUTANEOUS at 13:19

## 2021-05-13 RX ADMIN — HEPARIN SODIUM 5000 UNITS: 5000 INJECTION INTRAVENOUS; SUBCUTANEOUS at 21:22

## 2021-05-13 RX ADMIN — OXYCODONE HYDROCHLORIDE 5 MG: 5 TABLET ORAL at 10:37

## 2021-05-13 RX ADMIN — GABAPENTIN 300 MG: 300 CAPSULE ORAL at 17:27

## 2021-05-13 RX ADMIN — OXYCODONE HYDROCHLORIDE 10 MG: 5 TABLET ORAL at 19:20

## 2021-05-13 RX ADMIN — PANTOPRAZOLE SODIUM 40 MG: 40 INJECTION, POWDER, FOR SOLUTION INTRAVENOUS at 08:05

## 2021-05-13 RX ADMIN — ACETAMINOPHEN 975 MG: 325 TABLET, FILM COATED ORAL at 05:15

## 2021-05-13 RX ADMIN — POTASSIUM CHLORIDE 40 MEQ: 1500 TABLET, EXTENDED RELEASE ORAL at 17:27

## 2021-05-13 RX ADMIN — POTASSIUM CHLORIDE 20 MEQ: 14.9 INJECTION, SOLUTION INTRAVENOUS at 10:34

## 2021-05-13 RX ADMIN — ACETAMINOPHEN 975 MG: 325 TABLET, FILM COATED ORAL at 11:21

## 2021-05-13 RX ADMIN — POTASSIUM CHLORIDE 20 MEQ: 14.9 INJECTION, SOLUTION INTRAVENOUS at 07:59

## 2021-05-13 RX ADMIN — GABAPENTIN 300 MG: 300 CAPSULE ORAL at 21:22

## 2021-05-13 RX ADMIN — GABAPENTIN 300 MG: 300 CAPSULE ORAL at 08:05

## 2021-05-13 NOTE — UTILIZATION REVIEW
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,  Requiring  Segmental rectal resection   anastomosis then able to be completed, leak test negative, intraoperative colonoscopy with intact anastomosis    POD#1 Progress Note:  5/13/2021 Patient is post operative day one Colostomy reversal, takedown of splenic flexure, extensive CHARITY, segmental rectal resection, colorectal anastomosis, intraoperative colonoscopy  Patient  Has incisional pain  Clears tolerated  + flatus  Has not urinated since champion removed  Bladder scan 500  On exam abdomen soft, tender over incision sites  Few bowel sounds  H&H 10 5/37  1  With pre operative value of 10 1/31 8 on 2/23/2021  Na 133, K 2 9  Continue pain control, advance diet, encourage OOB  Replace electrolytes and trend BMP  Straight cath     5/13/2021 per nephrology:  Goal to reduce LOLIS and promote renal optimization  Baseline creatinine of 0 8 - 1 1  Plan is hold losartan, atenolol and chlorthalidone  Continue IVF  Patient hyponatremic and hypokalemic and repletion in process  And trend BMP       Admission Orders: Date/Time/Statement:   Admission Orders (From admission, onward)     Ordered        05/12/21 1144  Inpatient Admission  Once                   Orders Placed This Encounter   Procedures    Inpatient Admission     Standing Status:   Standing     Number of Occurrences:   1     Order Specific Question:   Level of Care     Answer:   Med Surg [16]     Order Specific Question:   Estimated length of stay     Answer:   More than 2 Midnights     Order Specific Question:   Certification Answer:   I certify that inpatient services are medically necessary for this patient for a duration of greater than two midnights  See H&P and MD Progress Notes for additional information about the patient's course of treatment  Vital Signs: /60   Pulse 64   Temp 97 9 °F (36 6 °C)   Resp 20   Ht 5' (1 524 m)   Wt 67 1 kg (148 lb)   SpO2 97%   BMI 28 90 kg/m²   05/13/21 08:03:24  97 9 °F (36 6 °C)  64  20  156/60  92  96 %  --  --  --  --   05/13/21 05:16:05  97 8 °F (36 6 °C)  66  --  157/61  93  96 %  --  --  --  --   05/13/21 00:08:24  97 6 °F (36 4 °C)  80  --  117/53  74  96 %  --  --  --  --   05/12/21 18:40:10  97 3 °F (36 3 °C)Abnormal   70  15  --  --  96 %  --  --  None (Room air)  --   05/12/21 1840  97 3 °F (36 3 °C)Abnormal   70  --  133/58  83  96 %  --  --           Pertinent Labs/Diagnostic Test Results:  No imaging     Results from last 7 days   Lab Units 05/13/21  0512   WBC Thousand/uL 8 06   HEMOGLOBIN g/dL 10 5*   HEMATOCRIT % 31 7*   PLATELETS Thousands/uL 197   NEUTROS ABS Thousands/µL 5 64     Results from last 7 days   Lab Units 05/13/21  0512 05/07/21  0827   SODIUM mmol/L 133* 139   POTASSIUM mmol/L 2 9* 3 9   CHLORIDE mmol/L 99* 102   CO2 mmol/L 23 28   ANION GAP mmol/L 11 9   BUN mg/dL 14 14   CREATININE mg/dL 1 08 0 94   EGFR ml/min/1 73sq m 51 60   CALCIUM mg/dL 8 0* 9 6   MAGNESIUM mg/dL 1 7  --    PHOSPHORUS mg/dL 3 2  --      Results from last 7 days   Lab Units 05/13/21  0512   AST U/L 16   ALT U/L 19   ALK PHOS U/L 68   TOTAL PROTEIN g/dL 6 3*   ALBUMIN g/dL 3 2*   TOTAL BILIRUBIN mg/dL 1 00       Results from last 7 days   Lab Units 05/13/21  0512 05/07/21  0827   GLUCOSE RANDOM mg/dL 109 106     Diet: clears to surgical soft/lite meal    Mobility:  Ambulate     DVT Prophylaxis: Bilateral SCDs    Heparin     Medications/Pain Control:   Scheduled Medications:  acetaminophen, 975 mg, Oral, Q6H ROBE  amLODIPine, 5 mg, Oral, Daily  atenolol, 100 mg, Oral, Daily  gabapentin, 300 mg, Oral, TID  heparin (porcine), 5,000 Units, Subcutaneous, Q8H ROBE  pantoprazole, 40 mg, Intravenous, Daily  potassium chloride, 40 mEq, Oral, Once - 1800 5/13    potassium chloride 20 mEq IVPB (premix) - given x 2 5/13  Dose: 20 mEq  Freq: Every 2 hours Route: IV  Last Dose: 20 mEq (05/13/21 1034)  Start: 05/13/21 0715 End: 05/13/21 1234    Continuous IV Infusions:  sodium chloride, 75 mL/hr, Intravenous, Continuous    lactated ringers infusion   Rate: 125 mL/hr Dose: 125 mL/hr  Freq: Continuous Route: IV  Indications of Use: IV Hydration  Last Dose: Stopped (05/12/21 1138)  Start: 05/12/21 0615 End: 05/12/21 1202    PRN Meds:  HYDROmorphone, 0 5 mg, Intravenous, Q2H PRN  ondansetron, 4 mg, Intravenous, Q6H PRN  oxyCODONE, 10 mg, Oral, Q4H PRN - used x 1   oxyCODONE, 5 mg, Oral, Q4H PRN - sued x 3         Network Utilization Review Department  ATTENTION: Please call with any questions or concerns to [phone] and carefully listen to the prompts so that you are directed to the right person  All voicemails are confidential   Osman Silverio all requests for admission clinical reviews, approved or denied determinations and any other requests to dedicated fax number below belonging to the campus where the patient is receiving treatment   List of dedicated fax numbers for the Facilities:  [address] DENIALS (Administrative/Medical Necessity) [phone]   [address] [place] [address] (Maternity/NICU/Pediatrics) [phone]   [address]   [address] [address] [facility] [phone] [id number] [place] [address] [facility] [phone]   [place] [address] [phone]

## 2021-05-13 NOTE — PLAN OF CARE
Problem: PAIN - ADULT  Goal: Verbalizes/displays adequate comfort level or baseline comfort level  Description: Interventions:  - Encourage patient to monitor pain and request assistance  - Assess pain using appropriate pain scale  - Administer analgesics based on type and severity of pain and evaluate response  - Implement non-pharmacological measures as appropriate and evaluate response  - Consider cultural and social influences on pain and pain management  - Notify physician/advanced practitioner if interventions unsuccessful or patient reports new pain  Outcome: Progressing     Problem: INFECTION - ADULT  Goal: Absence or prevention of progression during hospitalization  Description: INTERVENTIONS:  - Assess and monitor for signs and symptoms of infection  - Monitor lab/diagnostic results  - Monitor all insertion sites, i e  indwelling lines, tubes, and drains  - Monitor endotracheal if appropriate and nasal secretions for changes in amount and color  - [place] appropriate cooling/warming therapies per order  - Administer medications as ordered  - Instruct and encourage patient and family to use good hand hygiene technique  - Identify and instruct in appropriate isolation precautions for identified infection/condition  Outcome: Progressing  Goal: Absence of fever/infection during neutropenic period  Description: INTERVENTIONS:  - Monitor WBC    Outcome: Progressing     Problem: SAFETY ADULT  Goal: Patient will remain free of falls  Description: INTERVENTIONS:  - Assess patient frequently for physical needs  -  Identify cognitive and physical deficits and behaviors that affect risk of falls    -  [place] fall precautions as indicated by assessment   - Educate patient/family on patient safety including physical limitations  - Instruct patient to call for assistance with activity based on assessment  - Modify environment to reduce risk of injury  - Consider OT/PT consult to assist with strengthening/mobility  Outcome: Progressing  Goal: Maintain or return to baseline ADL function  Description: INTERVENTIONS:  -  Assess patient's ability to carry out ADLs; assess patient's baseline for ADL function and identify physical deficits which impact ability to perform ADLs (bathing, care of mouth/teeth, toileting, grooming, dressing, etc )  - Assess/evaluate cause of self-care deficits   - Assess range of motion  - Assess patient's mobility; develop plan if impaired  - Assess patient's need for assistive devices and provide as appropriate  - Encourage maximum independence but intervene and supervise when necessary  - Involve family in performance of ADLs  - Assess for home care needs following discharge   - Consider OT consult to assist with ADL evaluation and planning for discharge  - Provide patient education as appropriate  Outcome: Progressing  Goal: Maintain or return mobility status to optimal level  Description: INTERVENTIONS:  - Assess patient's baseline mobility status (ambulation, transfers, stairs, etc )    - Identify cognitive and physical deficits and behaviors that affect mobility  - Identify mobility aids required to assist with transfers and/or ambulation (gait belt, sit-to-stand, lift, walker, cane, etc )  - [place] fall precautions as indicated by assessment  - Record patient progress and toleration of activity level on Mobility SBAR; progress patient to next Phase/Stage  - Instruct patient to call for assistance with activity based on assessment  - Consider rehabilitation consult to assist with strengthening/weightbearing, etc   Outcome: Progressing     Problem: DISCHARGE PLANNING  Goal: Discharge to home or other facility with appropriate resources  Description: INTERVENTIONS:  - Identify barriers to discharge w/patient and caregiver  - Arrange for needed discharge resources and transportation as appropriate  - Identify discharge learning needs (meds, wound care, etc )  - Arrange for interpretive services to assist at discharge as needed  - Refer to Case Management Department for coordinating discharge planning if the patient needs post-hospital services based on physician/advanced practitioner order or complex needs related to functional status, cognitive ability, or social support system  Outcome: Progressing     Problem: Knowledge Deficit  Goal: Patient/family/caregiver demonstrates understanding of disease process, treatment plan, medications, and discharge instructions  Description: Complete learning assessment and assess knowledge base    Interventions:  - Provide teaching at level of understanding  - Provide teaching via preferred learning methods  Outcome: Progressing     Problem: GASTROINTESTINAL - ADULT  Goal: Minimal or absence of nausea and/or vomiting  Description: INTERVENTIONS:  - Administer IV fluids if ordered to ensure adequate hydration  - Maintain NPO status until nausea and vomiting are resolved  - Nasogastric tube if ordered  - Administer ordered antiemetic medications as needed  - Provide nonpharmacologic comfort measures as appropriate  - Advance diet as tolerated, if ordered  - Consider nutrition services referral to assist patient with adequate nutrition and appropriate food choices  Outcome: Progressing  Goal: Maintains or returns to baseline bowel function  Description: INTERVENTIONS:  - Assess bowel function  - Encourage oral fluids to ensure adequate hydration  - Administer IV fluids if ordered to ensure adequate hydration  - Administer ordered medications as needed  - Encourage mobilization and activity  - Consider nutritional services referral to assist patient with adequate nutrition and appropriate food choices  Outcome: Progressing  Goal: Maintains adequate nutritional intake  Description: INTERVENTIONS:  - Monitor percentage of each meal consumed  - Identify factors contributing to decreased intake, treat as appropriate  - Assist with meals as needed  - Monitor I&O, weight, and lab values if indicated  - Obtain nutrition services referral as needed  Outcome: Progressing     Problem: Potential for Falls  Goal: Patient will remain free of falls  Description: INTERVENTIONS:  - Assess patient frequently for physical needs  -  Identify cognitive and physical deficits and behaviors that affect risk of falls    -  [place] fall precautions as indicated by assessment   - Educate patient/family on patient safety including physical limitations  - Instruct patient to call for assistance with activity based on assessment  - Modify environment to reduce risk of injury  - Consider OT/PT consult to assist with strengthening/mobility  Outcome: Progressing

## 2021-05-13 NOTE — CASE MANAGEMENT
LOS: 1 day  Bundle: No  Unplanned Readmission Score: No  30 Day Readmission: No     CM met with patient at bedside  Explained the role of CM  Obtained the following information from patient  Home: 1 story home    10-15 steps to enter  Lives With: alone  ADL's: Self  DME: None  Ambulation:Self   Transportation: Self  Pharmacy[facility] [place]  PCP: Caleb Vargas  [facility] Hx: Revoluntary C after last surgery   Rehab Hx: No  Mental Health Hx: No  Substance Abuse Hx: No  Employment:Retired  POA/LW/AD: Daughters/Yes/Yes  Transport at D/C: Daughter    CM reviewed d/c planning process including the following: identifying help at home, patient preferences for d/c planning needs availability of treatment team to discuss questions or concerns patient and/or family may have regarding understanding medications and recognizing signs and symptoms once discharged  She plans on staying with her daughter Aileen Keaton in [facility] at discharge  Address: [place] [address] [address]  Her daughter will help at home as needed  No discharge needs at this time  CM following through discharge

## 2021-05-13 NOTE — PROGRESS NOTES
Progress Note - General Surgery   Jesus Trinh 70 y o  female MRN: [id number]  Unit/Bed#: -01 Encounter: [id number]    Assessment/Plan:    Colostomy may secondary to  Mery's procedure 02/18/2021 for perforated sigmoid colon diverticulitis  -POD#1 s/p  Colostomy reversal, takedown of splenic flexure, extensive lysis of adhesions, segmental rectal resection, colorectal anastomosis, intraoperative colonoscopy  - patient is feeling well, passing some flatus, pain controlled  - continue ERAS protocol  - continue pain control as needed  - advanced to surgical soft low-residue diet as tolerated  - encourage out of bed, ambulation, incentive spirometer  - if patient continues to do well, plan for discharge tomorrow    Electrolyte abnormalities  - likely related to bowel prep  -replace and momitor    Urinary retention  - patient with prior history of urinary retention postoperatively  - Hawley catheter removed at midnight, still no voiding, bladder scan 500 ml  - for straight cath this morning  - continue voiding trial, a urinary tension protocol    HTN, HLD  - continue home medications  - monitor BP    Subjective/Objective   Chief Complaint:  No complaints    Subjective:  Patient was some incisional pain but controlled  Tolerating clear liquids  No nausea or vomiting  States she is passing some flatus  Out of bed to bathroom  Unable to urinate since fully removed at midnight  Objective:     Blood pressure 156/60, pulse 64, temperature 97 9 °F (36 6 °C), resp  rate 20, height 5' (1 524 m), weight 67 1 kg (148 lb), SpO2 97 %, not currently breastfeeding  ,Body mass index is 28 9 kg/m²        Intake/Output Summary (Last 24 hours) at 5/13/2021 1219  Last data filed at 5/13/2021 0901  Gross per 24 hour   Intake 960 ml   Output 1475 ml   Net -515 ml       Invasive Devices     Peripheral Intravenous Line            Peripheral IV 05/12/21 Left Hand 1 day                Physical Exam:   General appearance: alert and oriented, in no acute distress  Head: Normocephalic, without obvious abnormality, atraumatic, sclerae anicteric, mucous membranes moist  Neck: no JVD and supple, symmetrical, trachea midline  Lungs: clear to auscultation, no wheezes or rales  Heart:   Regular rate and rhythm, S1-S2 normal, no murmur  Abdomen:   Flat, soft, tender over incision sites, few bowel sounds  Extremities:   No edema, redness or tenderness in the calves or thighs  Skin: Warm, dry; incision sites clean, dry intact  Nursing notes and vital signs reviewed      Lab, Imaging and other studies:  I have personally reviewed pertinent lab results    , CBC:   Lab Results   Component Value Date    WBC 8 06 05/13/2021    HGB 10 5 (L) 05/13/2021    HCT 31 7 (L) 05/13/2021    MCV 88 05/13/2021     05/13/2021    MCH 29 1 05/13/2021    MCHC 33 1 05/13/2021    RDW 12 7 05/13/2021    MPV 9 7 05/13/2021    NRBC 0 05/13/2021   , CMP:   Lab Results   Component Value Date    SODIUM 133 (L) 05/13/2021    K 2 9 (L) 05/13/2021    CL 99 (L) 05/13/2021    CO2 23 05/13/2021    BUN 14 05/13/2021    CREATININE 1 08 05/13/2021    CALCIUM 8 0 (L) 05/13/2021    AST 16 05/13/2021    ALT 19 05/13/2021    ALKPHOS 68 05/13/2021    EGFR 51 05/13/2021     VTE Pharmacologic Prophylaxis: Heparin  VTE Mechanical Prophylaxis: sequential compression device     Ara Mario PA-C

## 2021-05-13 NOTE — CONSULTS
Consultation - Nephrology   Yenny Tabor 70 y o  female MRN: [id number]  Unit/Bed#: -01 Encounter: [id number]    ASSESSMENT and PLAN:  1  Renal optimization to reduce incidence of acute kidney injury:   - upon review of medical records, baseline creatinine 0 8- 1 1 mg/dL; Baseline eGFR 60-70 with eGFR in 46s in February  - most recent creatinine 1 08 mg/dL today post-procedure  - continue to hold losartan and atenolol/chlorthalidone for now  - continued on IV fluid hydration per surgical team, can discontinue once tolerating diet  - prior UA reviewed, no proteinuria  - prior CT scan reviewed, no hydronephrosis bilaterally  - avoid NSAIDs, nephrotoxic agents, IV contrast    - per discussion with patient, she was taking Aleve as an outpatient, advised her to discontinue this  - adjust medications to appropriate GFR  - monitor volume status with strict intake/output, daily weight  - will continue monitor renal indices with repeat lab studies  2  Electrolytes, acid/base:  - hyponatremia with most recent sodium 133 today, monitor in the setting of IV fluid hydration  If sodium continues to decrease, will complete secondary workup  - hypokalemia most recent potassium 2 9, receiving IV and p o  replacement  3  Hypertension:  - blood pressure with fluctuations  - outpatient regimen includes:  Amlodipine 5 mg daily, atenolol/chlorthalidone 100-25 mg tablet daily, losartan 100 mg daily  - currently on: Amlodipine 5 mg daily, atenolol 100 mg daily  - recommendations: Continue to hold chlorthalidone and losartan for now  - optimize hemodynamics, avoid hypotension and fluctuations of blood pressure  4  H/H, anemia:  - most recent hemoglobin 10 5 grams/deciliter   - goal hemoglobin greater than 8 grams/deciliter    - recommend PRBC transfusion for hemoglobin less than 7      5  Status post colostomy takedown/reverse heartmann, laparoscopic anastomosis of colon, lysis of adhesions and intraop colostomy, segmental rectal resection with takedown of splenic flexure  HISTORY OF PRESENT ILLNESS:  Requesting Physician: Beto Calderon MD  Reason for Consult:  Renal optimization    Kanika Merino is a 70 y o  female with history of hyperlipidemia, hypertension who was admitted to [facility] after presenting for procedure with general surgery  Patient is status post colostomy takedown/reverse heartmann, laparoscopic anastomosis of colon, lysis of adhesions and intraop colostomy, segmental rectal resection with takedown of splenic flexure  Upon assessment and evaluation, patient resting comfortably in bed  Patient without acute shortness of breath or chest pain  Patient is tolerating liquid diet, plan to transition to full diet  Patient denies nausea, vomiting, diarrhea  A renal consultation is requested today for assistance in the management of renal optimization  PAST MEDICAL HISTORY:  Past Medical History:   Diagnosis Date    Allergic rhinitis     Basal cell carcinoma (BCC)     Pt states with BCC - had it removed by derm      Colostomy in place [facility])     DDD (degenerative disc disease), lumbar     Diverticulitis large intestine w/o perforation or abscess w/o bleeding     GERD (gastroesophageal reflux disease)     Hemorrhoids     Hernia of abdominal cavity 03/18/2020    History of gallstones     Hyperlipidemia     Hypertension     Lyme disease 1988    Pneumonia     Hx of pneumonia x1       PAST SURGICAL HISTORY:  Past Surgical History:   Procedure Laterality Date    BAND HEMORRHOIDECTOMY  07/08/2020    CHOLECYSTECTOMY      COLONOSCOPY      COLOSTOMY Left 2/18/2021    Procedure: COLOSTOMY END creation;  Surgeon: Christine Felix MD;  Location:  MAIN OR;  Service: [facility] Day FINGER SURGERY      RIGHT SECOND FINGER-"TRIGGER FINGER"    LAPAROTOMY N/A 2/18/2021    Procedure: LAPAROTOMY EXPLORATORY;  Surgeon: Christine Felix MD;  Location:  MAIN OR;  Service: General    PILONIDAL CYST EXCISION      ND CLOSE ENTEROSTOMY N/A 2021    Procedure: COLOSTOMY TAKEDOWN / REVERSE GOLDEN, laparocopic anastomosis of colon, Extensive lysis of adhesions, intraop colonoscopy, Bilateral Tap block, Segmental rectum rection;  Surgeon: Queenie Govea MD;  Location: [facility] MAIN OR;  Service: General    ND LAP,DIAGNOSTIC ABDOMEN N/A 2021    Procedure: LAPAROSCOPY DIAGNOSTIC;  Surgeon: Adrián Escoto MD;  Location: [facility] MAIN OR;  Service: General    ND Alisa Shante 19 Right 2020    Procedure: REPAIR HERNIA INGUINAL and femoral hernia, LAPAROSCOPIC;  Surgeon: Queenie Govea MD;  Location: [facility] MAIN OR;  Service: General    ND PART REMOVAL COLON W ANASTOMOSIS N/A 2021    Procedure: RESECTION COLON SIGMOID;  Surgeon: Adrián Escoto MD;  Location: [facility] MAIN OR;  Service: General    SHOULDER SURGERY Right        ALLERGIES:  Allergies   Allergen Reactions    Pollen Extract Itching     Sneezing     Sulfa Antibiotics Rash     Cheeks and ears turned red  Other reaction(s): Shakiness       SOCIAL HISTORY:  Social History     Substance and Sexual Activity   Alcohol Use Not Currently    Comment: Denies any use currently      Social History     Substance and Sexual Activity   Drug Use Not Currently    Comment: Denies     Social History     Tobacco Use   Smoking Status Former Smoker    Packs/day: 0 50    Years: 5 00    Pack years: 2 50    Types: Cigarettes    Quit date: Mariajose Mcclure Years since quittin 3   Smokeless Tobacco Never Used       FAMILY HISTORY:  Family History   Problem Relation Age of Onset    Coronary artery disease Mother     Hypertension Mother     Hypotension Father     Emphysema Father     Prostate cancer Father        MEDICATIONS:    Current Facility-Administered Medications:     acetaminophen (TYLENOL) tablet 975 mg, 975 mg, Oral, Q6H [facility], Jessie Mario PA-C, 975 mg at 21 0515    amLODIPine (NORVASC) tablet 5 mg, 5 mg, Oral, Daily, Blair Smalls, DO, 5 mg at 05/13/21 0805    atenolol (TENORMIN) tablet 100 mg, 100 mg, Oral, Daily, Blair Smalls, DO, 100 mg at 05/13/21 0805    gabapentin (NEURONTIN) capsule 300 mg, 300 mg, Oral, TID, Jessie Villa-Kevin, PA-C, 300 mg at 05/13/21 0805    heparin (porcine) subcutaneous injection 5,000 Units, 5,000 Units, Subcutaneous, Q8H [facility], 5,000 Units at 05/13/21 0515 **AND** [CANCELED] Platelet count, , , AM Draw, Zoe Mario PA-C    HYDROmorphone (DILAUDID) injection 0 5 mg, 0 5 mg, Intravenous, Q2H PRN, Zoe Mario PA-C    lactated ringers bolus 1,000 mL, 1,000 mL, Intravenous, Once PRN **AND** lactated ringers bolus 1,000 mL, 1,000 mL, Intravenous, Once PRN, Zoe Mario PA-C    ondansetron [facility]) injection 4 mg, 4 mg, Intravenous, Q6H PRN, Zoe Mario PA-C    oxyCODONE (ROXICODONE) IR tablet 10 mg, 10 mg, Oral, Q4H PRN, Zoe Villa-Kevin, PA-C, 10 mg at 05/12/21 1436    oxyCODONE (ROXICODONE) IR tablet 5 mg, 5 mg, Oral, Q4H PRN, Zoe Villa-Kevin, PA-C, 5 mg at 05/13/21 0149    pantoprazole (PROTONIX) injection 40 mg, 40 mg, Intravenous, Daily, Jessie Astpatito-Kevin, PA-C, 40 mg at 05/13/21 0805    potassium chloride (K-DUR,KLOR-CON) CR tablet 40 mEq, 40 mEq, Oral, Once, TRISHA MitchellC    potassium chloride 20 mEq IVPB (premix), 20 mEq, Intravenous, Q2H, Jessie Astl-Kevin, PA-C, Last Rate: 50 mL/hr at 05/13/21 0759, 20 mEq at 05/13/21 0759    sodium chloride 0 9 % bolus 1,000 mL, 1,000 mL, Intravenous, Once PRN **AND** sodium chloride 0 9 % bolus 1,000 mL, 1,000 mL, Intravenous, Once PRN, Zoe Villa-Kevin, PA-C    sodium chloride 0 9 % infusion, 75 mL/hr, Intravenous, Continuous, Jessie Astl-Kevin, PA-C, Last Rate: 75 mL/hr at 05/13/21 0940, 75 mL/hr at 05/13/21 0940    REVIEW OF SYSTEMS:  All the systems were reviewed and were negative except as documented on the HPI      PHYSICAL EXAM:  Current Weight: Weight - Scale: 67 1 kg (148 lb)  First Weight: Weight - Scale: 68 kg (150 lb)  Vitals:    05/13/21 0008 05/13/21 0516 05/13/21 0803 05/13/21 0900   BP: 117/53 157/61 156/60    Pulse: 80 66 64    Resp:   20    Temp: 97 6 °F (36 4 °C) 97 8 °F (36 6 °C) 97 9 °F (36 6 °C)    TempSrc:       SpO2: 96% 96% 96% 97%   Weight:       Height:           Intake/Output Summary (Last 24 hours) at 5/13/2021 0956  Last data filed at 5/13/2021 0901  Gross per 24 hour   Intake 1960 ml   Output 1495 ml   Net 465 ml     General: conscious, coherent, cooperative, not in acute distress  Skin: no rash, warm, dry  Eyes: pale conjunctivae, anicteric sclerae  ENT: moist lips and mucous membranes  Neck: supple with trachea midline  Chest: clear breath sounds bilaterally  CVS: normal rate, regular rhythm  Abdomen: soft, tender, nondistended  Extremities: no edema of both legs  Neuro: awake, alert  Psych: appropriate affect       Invasive Devices:        Lab Results:   Results from last 7 days   Lab Units 05/13/21  0512 05/07/21  0827   WBC Thousand/uL 8 06  --    HEMOGLOBIN g/dL 10 5*  --    HEMATOCRIT % 31 7*  --    PLATELETS Thousands/uL 197  --    POTASSIUM mmol/L 2 9* 3 9   CHLORIDE mmol/L 99* 102   CO2 mmol/L 23 28   BUN mg/dL 14 14   CREATININE mg/dL 1 08 0 94   CALCIUM mg/dL 8 0* 9 6   MAGNESIUM mg/dL 1 7  --    PHOSPHORUS mg/dL 3 2  --    ALK PHOS U/L 68  --    ALT U/L 19  --    AST U/L 16  --

## 2021-05-14 VITALS
BODY MASS INDEX: 29.06 KG/M2 | OXYGEN SATURATION: 100 % | HEIGHT: 60 IN | HEART RATE: 70 BPM | RESPIRATION RATE: 16 BRPM | TEMPERATURE: 97.8 F | WEIGHT: 148 LBS | DIASTOLIC BLOOD PRESSURE: 51 MMHG | SYSTOLIC BLOOD PRESSURE: 129 MMHG

## 2021-05-14 LAB
ANION GAP SERPL CALCULATED.3IONS-SCNC: 8 MMOL/L (ref 4–13)
BASOPHILS # BLD AUTO: 0.04 THOUSANDS/ΜL (ref 0–0.1)
BASOPHILS NFR BLD AUTO: 1 % (ref 0–1)
BUN SERPL-MCNC: 10 MG/DL (ref 5–25)
CALCIUM SERPL-MCNC: 8.5 MG/DL (ref 8.3–10.1)
CHLORIDE SERPL-SCNC: 102 MMOL/L (ref 100–108)
CO2 SERPL-SCNC: 27 MMOL/L (ref 21–32)
CREAT SERPL-MCNC: 0.9 MG/DL (ref 0.6–1.3)
EOSINOPHIL # BLD AUTO: 0.23 THOUSAND/ΜL (ref 0–0.61)
EOSINOPHIL NFR BLD AUTO: 3 % (ref 0–6)
ERYTHROCYTE [DISTWIDTH] IN BLOOD BY AUTOMATED COUNT: 13.7 % (ref 11.6–15.1)
GFR SERPL CREATININE-BSD FRML MDRD: 64 ML/MIN/1.73SQ M
GLUCOSE SERPL-MCNC: 92 MG/DL (ref 65–140)
HCT VFR BLD AUTO: 32 % (ref 34.8–46.1)
HGB BLD-MCNC: 10.2 G/DL (ref 11.5–15.4)
IMM GRANULOCYTES # BLD AUTO: 0.01 THOUSAND/UL (ref 0–0.2)
IMM GRANULOCYTES NFR BLD AUTO: 0 % (ref 0–2)
LYMPHOCYTES # BLD AUTO: 1.28 THOUSANDS/ΜL (ref 0.6–4.47)
LYMPHOCYTES NFR BLD AUTO: 18 % (ref 14–44)
MAGNESIUM SERPL-MCNC: 1.9 MG/DL (ref 1.6–2.6)
MCH RBC QN AUTO: 29 PG (ref 26.8–34.3)
MCHC RBC AUTO-ENTMCNC: 31.9 G/DL (ref 31.4–37.4)
MCV RBC AUTO: 91 FL (ref 82–98)
MONOCYTES # BLD AUTO: 0.59 THOUSAND/ΜL (ref 0.17–1.22)
MONOCYTES NFR BLD AUTO: 8 % (ref 4–12)
NEUTROPHILS # BLD AUTO: 5.12 THOUSANDS/ΜL (ref 1.85–7.62)
NEUTS SEG NFR BLD AUTO: 70 % (ref 43–75)
PHOSPHATE SERPL-MCNC: 2.1 MG/DL (ref 2.3–4.1)
PLATELET # BLD AUTO: 199 THOUSANDS/UL (ref 149–390)
PMV BLD AUTO: 10.3 FL (ref 8.9–12.7)
POTASSIUM SERPL-SCNC: 3.8 MMOL/L (ref 3.5–5.3)
RBC # BLD AUTO: 3.52 MILLION/UL (ref 3.81–5.12)
SODIUM SERPL-SCNC: 137 MMOL/L (ref 136–145)
WBC # BLD AUTO: 7.27 THOUSAND/UL (ref 4.31–10.16)

## 2021-05-14 PROCEDURE — 85025 COMPLETE CBC W/AUTO DIFF WBC: CPT | Performed by: PHYSICIAN ASSISTANT

## 2021-05-14 PROCEDURE — 99232 SBSQ HOSP IP/OBS MODERATE 35: CPT | Performed by: NURSE PRACTITIONER

## 2021-05-14 PROCEDURE — 80048 BASIC METABOLIC PNL TOTAL CA: CPT | Performed by: PHYSICIAN ASSISTANT

## 2021-05-14 PROCEDURE — 83735 ASSAY OF MAGNESIUM: CPT | Performed by: PHYSICIAN ASSISTANT

## 2021-05-14 PROCEDURE — C9113 INJ PANTOPRAZOLE SODIUM, VIA: HCPCS | Performed by: PHYSICIAN ASSISTANT

## 2021-05-14 PROCEDURE — 84100 ASSAY OF PHOSPHORUS: CPT | Performed by: PHYSICIAN ASSISTANT

## 2021-05-14 PROCEDURE — 99024 POSTOP FOLLOW-UP VISIT: CPT | Performed by: PHYSICIAN ASSISTANT

## 2021-05-14 PROCEDURE — NC001 PR NO CHARGE: Performed by: PHYSICIAN ASSISTANT

## 2021-05-14 RX ORDER — OXYCODONE HYDROCHLORIDE 5 MG/1
5 TABLET ORAL EVERY 4 HOURS PRN
Qty: 15 TABLET | Refills: 0 | Status: SHIPPED | OUTPATIENT
Start: 2021-05-14 | End: 2021-05-14

## 2021-05-14 RX ORDER — OXYCODONE HYDROCHLORIDE 5 MG/1
5 TABLET ORAL EVERY 4 HOURS PRN
Qty: 15 TABLET | Refills: 0 | Status: SHIPPED | OUTPATIENT
Start: 2021-05-14 | End: 2021-05-17

## 2021-05-14 RX ORDER — IBUPROFEN 200 MG
600 TABLET ORAL EVERY 6 HOURS PRN
Qty: 60 TABLET | Refills: 0
Start: 2021-05-14

## 2021-05-14 RX ADMIN — OXYCODONE HYDROCHLORIDE 5 MG: 5 TABLET ORAL at 11:31

## 2021-05-14 RX ADMIN — HEPARIN SODIUM 5000 UNITS: 5000 INJECTION INTRAVENOUS; SUBCUTANEOUS at 05:09

## 2021-05-14 RX ADMIN — GABAPENTIN 300 MG: 300 CAPSULE ORAL at 08:30

## 2021-05-14 RX ADMIN — ATENOLOL 100 MG: 50 TABLET ORAL at 08:30

## 2021-05-14 RX ADMIN — AMLODIPINE BESYLATE 5 MG: 5 TABLET ORAL at 08:30

## 2021-05-14 RX ADMIN — OXYCODONE HYDROCHLORIDE 5 MG: 5 TABLET ORAL at 05:09

## 2021-05-14 RX ADMIN — ACETAMINOPHEN 975 MG: 325 TABLET, FILM COATED ORAL at 05:09

## 2021-05-14 RX ADMIN — PANTOPRAZOLE SODIUM 40 MG: 40 INJECTION, POWDER, FOR SOLUTION INTRAVENOUS at 08:30

## 2021-05-14 RX ADMIN — Medication 2 TABLET: at 09:34

## 2021-05-14 RX ADMIN — OXYCODONE HYDROCHLORIDE 10 MG: 5 TABLET ORAL at 00:03

## 2021-05-14 RX ADMIN — ACETAMINOPHEN 975 MG: 325 TABLET, FILM COATED ORAL at 00:02

## 2021-05-14 NOTE — PROGRESS NOTES
Progress Note - General Surgery   Lars Anne 70 y o  female MRN: [id number]  Unit/Bed#: -01 Encounter: [id number]    Assessment/Plan:  Colostomy secondary to  Mery's procedure 02/18/2021 for perforated sigmoid colon diverticulitis  -POD#2 s/p  Colostomy reversal, takedown of splenic flexure, extensive lysis of adhesions, segmental rectal resection, colorectal anastomosis, intraoperative colonoscopy  - patient doing well, tolerating diet, did have bowel movement last night, ambulating  -yessica removed from ostomy incision site, still with underlying fluid  Piece of half-inch packing placed in wound site  Will have patient removed at home in 2 days  - continue ERAS protocol  - continue pain control as needed  - continue low residue diet, will provide printout for discharge  - encourage out of bed, ambulation, incentive spirometer  -  Discharged home today, discharge instructions reviewed with patient, called patient's daughter Himanshu Donis to review as well     Electrolyte abnormalities  - improved today, phosphorus mildly decreased  - replace phosphorus     Urinary retention  - resolved  -patient urinating well     HTN, HLD  - continue home medications    Subjective/Objective   Chief Complaint:  No complaints    Subjective:  Patient states she feels well  Still with some incisional pain  Otherwise tolerating low residue diet  Did have a bowel movement last evening  No fevers or chills  Ambulating  Urinating well  Objective:    Blood pressure 129/51, pulse 70, temperature 97 8 °F (36 6 °C), resp  rate 16, height 5' (1 524 m), weight 67 1 kg (148 lb), SpO2 100 %, not currently breastfeeding  ,Body mass index is 28 9 kg/m²        Intake/Output Summary (Last 24 hours) at 5/14/2021 0903  Last data filed at 5/14/2021 0624  Gross per 24 hour   Intake 480 ml   Output 725 ml   Net -245 ml       Invasive Devices     Peripheral Intravenous Line            Peripheral IV 05/12/21 Left Hand 2 days Physical Exam:   General appearance: alert and oriented, in no acute distress  Head: Normocephalic, without obvious abnormality, atraumatic, sclerae anicteric, mucous membranes moist  Neck: no JVD and supple, symmetrical, trachea midline  Lungs: clear to auscultation, no wheezes or rales  Heart:   Regular rate and rhythm, S1-S2 normal, no murmur  Abdomen:   Flat, soft,   Mild distension, tender over incision sites, active bowel sounds  Extremities:   No edema, redness or tenderness in the calves or thighs  Skin: Warm, dry; incision sites clean,intact,  Blood-tinged drainage from previous ostomy incision,  Claudia removed, strip of packing placed  Nursing notes and vital signs reviewed      Lab, Imaging and other studies:  I have personally reviewed pertinent lab results    , CBC:   Lab Results   Component Value Date    WBC 7 27 05/14/2021    HGB 10 2 (L) 05/14/2021    HCT 32 0 (L) 05/14/2021    MCV 91 05/14/2021     05/14/2021    MCH 29 0 05/14/2021    MCHC 31 9 05/14/2021    RDW 13 7 05/14/2021    MPV 10 3 05/14/2021   , CMP:   Lab Results   Component Value Date    SODIUM 137 05/14/2021    K 3 8 05/14/2021     05/14/2021    CO2 27 05/14/2021    BUN 10 05/14/2021    CREATININE 0 90 05/14/2021    CALCIUM 8 5 05/14/2021    EGFR 64 05/14/2021     VTE Pharmacologic Prophylaxis: Heparin  VTE Mechanical Prophylaxis: sequential compression device     Uriel Mario PA-C

## 2021-05-14 NOTE — PLAN OF CARE
Problem: PAIN - ADULT  Goal: Verbalizes/displays adequate comfort level or baseline comfort level  Description: Interventions:  - Encourage patient to monitor pain and request assistance  - Assess pain using appropriate pain scale  - Administer analgesics based on type and severity of pain and evaluate response  - Implement non-pharmacological measures as appropriate and evaluate response  - Consider cultural and social influences on pain and pain management  - Notify physician/advanced practitioner if interventions unsuccessful or patient reports new pain  Outcome: Progressing     Problem: INFECTION - ADULT  Goal: Absence or prevention of progression during hospitalization  Description: INTERVENTIONS:  - Assess and monitor for signs and symptoms of infection  - Monitor lab/diagnostic results  - Monitor all insertion sites, i e  indwelling lines, tubes, and drains  - Monitor endotracheal if appropriate and nasal secretions for changes in amount and color  - [place] appropriate cooling/warming therapies per order  - Administer medications as ordered  - Instruct and encourage patient and family to use good hand hygiene technique  - Identify and instruct in appropriate isolation precautions for identified infection/condition  Outcome: Progressing  Goal: Absence of fever/infection during neutropenic period  Description: INTERVENTIONS:  - Monitor WBC    Outcome: Progressing     Problem: SAFETY ADULT  Goal: Patient will remain free of falls  Description: INTERVENTIONS:  - Assess patient frequently for physical needs  -  Identify cognitive and physical deficits and behaviors that affect risk of falls    -  [place] fall precautions as indicated by assessment   - Educate patient/family on patient safety including physical limitations  - Instruct patient to call for assistance with activity based on assessment  - Modify environment to reduce risk of injury  - Consider OT/PT consult to assist with strengthening/mobility  Outcome: Progressing  Goal: Maintain or return to baseline ADL function  Description: INTERVENTIONS:  -  Assess patient's ability to carry out ADLs; assess patient's baseline for ADL function and identify physical deficits which impact ability to perform ADLs (bathing, care of mouth/teeth, toileting, grooming, dressing, etc )  - Assess/evaluate cause of self-care deficits   - Assess range of motion  - Assess patient's mobility; develop plan if impaired  - Assess patient's need for assistive devices and provide as appropriate  - Encourage maximum independence but intervene and supervise when necessary  - Involve family in performance of ADLs  - Assess for home care needs following discharge   - Consider OT consult to assist with ADL evaluation and planning for discharge  - Provide patient education as appropriate  Outcome: Progressing  Goal: Maintain or return mobility status to optimal level  Description: INTERVENTIONS:  - Assess patient's baseline mobility status (ambulation, transfers, stairs, etc )    - Identify cognitive and physical deficits and behaviors that affect mobility  - Identify mobility aids required to assist with transfers and/or ambulation (gait belt, sit-to-stand, lift, walker, cane, etc )  - [place] fall precautions as indicated by assessment  - Record patient progress and toleration of activity level on Mobility SBAR; progress patient to next Phase/Stage  - Instruct patient to call for assistance with activity based on assessment  - Consider rehabilitation consult to assist with strengthening/weightbearing, etc   Outcome: Progressing     Problem: DISCHARGE PLANNING  Goal: Discharge to home or other facility with appropriate resources  Description: INTERVENTIONS:  - Identify barriers to discharge w/patient and caregiver  - Arrange for needed discharge resources and transportation as appropriate  - Identify discharge learning needs (meds, wound care, etc )  - Arrange for interpretive services to assist at discharge as needed  - Refer to Case Management Department for coordinating discharge planning if the patient needs post-hospital services based on physician/advanced practitioner order or complex needs related to functional status, cognitive ability, or social support system  Outcome: Progressing     Problem: Knowledge Deficit  Goal: Patient/family/caregiver demonstrates understanding of disease process, treatment plan, medications, and discharge instructions  Description: Complete learning assessment and assess knowledge base    Interventions:  - Provide teaching at level of understanding  - Provide teaching via preferred learning methods  Outcome: Progressing     Problem: GASTROINTESTINAL - ADULT  Goal: Minimal or absence of nausea and/or vomiting  Description: INTERVENTIONS:  - Administer IV fluids if ordered to ensure adequate hydration  - Maintain NPO status until nausea and vomiting are resolved  - Nasogastric tube if ordered  - Administer ordered antiemetic medications as needed  - Provide nonpharmacologic comfort measures as appropriate  - Advance diet as tolerated, if ordered  - Consider nutrition services referral to assist patient with adequate nutrition and appropriate food choices  Outcome: Progressing  Goal: Maintains or returns to baseline bowel function  Description: INTERVENTIONS:  - Assess bowel function  - Encourage oral fluids to ensure adequate hydration  - Administer IV fluids if ordered to ensure adequate hydration  - Administer ordered medications as needed  - Encourage mobilization and activity  - Consider nutritional services referral to assist patient with adequate nutrition and appropriate food choices  Outcome: Progressing  Goal: Maintains adequate nutritional intake  Description: INTERVENTIONS:  - Monitor percentage of each meal consumed  - Identify factors contributing to decreased intake, treat as appropriate  - Assist with meals as needed  - Monitor I&O, weight, and lab values if indicated  - Obtain nutrition services referral as needed  Outcome: Progressing     Problem: Potential for Falls  Goal: Patient will remain free of falls  Description: INTERVENTIONS:  - Assess patient frequently for physical needs  -  Identify cognitive and physical deficits and behaviors that affect risk of falls    -  [place] fall precautions as indicated by assessment   - Educate patient/family on patient safety including physical limitations  - Instruct patient to call for assistance with activity based on assessment  - Modify environment to reduce risk of injury  - Consider OT/PT consult to assist with strengthening/mobility  Outcome: Progressing

## 2021-05-14 NOTE — PROGRESS NOTES
NEPHROLOGY PROGRESS NOTE   Winter Uribe 70 y o  female MRN: [id number]  Unit/Bed#: -01 Encounter: [id number]      ASSESSMENT/PLAN:  1  Renal optimization to reduce incidence of acute kidney injury:   - upon review of medical records, baseline creatinine 0 8- 1 1 mg/dL; Baseline eGFR 60-70 with eGFR in 46s in February  - most recent creatinine 0 90 mg/dL today  - prior UA reviewed, no proteinuria  - prior CT scan reviewed, no hydronephrosis bilaterally  - avoid NSAIDs, nephrotoxic agents, IV contrast               - per discussion with patient, she was taking Aleve as an outpatient, advised her to discontinue this  2  Hypophosphatemia:  - most recent phosphorus 2 1, received replacement per surgery team      3  Hypertension:  - blood pressure with fluctuations  - outpatient regimen includes:  Amlodipine 5 mg daily, atenolol/chlorthalidone 100-25 mg tablet daily, losartan 100 mg daily  - currently on: Amlodipine 5 mg daily, atenolol 100 mg daily  - recommendations: Please see recommendations below      4  H/H, anemia:  - most recent hemoglobin 10 2 grams/deciliter   - goal hemoglobin greater than 8 grams/deciliter  - recommend PRBC transfusion for hemoglobin less than 7       5  Status post colostomy takedown/reverse heartmann, laparoscopic anastomosis of colon, lysis of adhesions and intraop colostomy, segmental rectal resection with takedown of splenic flexure  Disposition:  Patient is stable for discharge from renal standpoint  I have personally reviewed antihypertensive regimen with patient  Will plan to continue amlodipine 5 mg daily; advised patient to take half of her atenolol/chlorthalidone 100-25 mg tablet; if her systolic blood pressure is >130, then she can proceed to take the complete tablet    Will hold losartan for now until outpatient labs reveal renal function is stable; at that time, her PCP can then resume losartan      SUBJECTIVE:  Patient is getting ready for discharge  Patient is without acute shortness of breath or chest pain  Patient denies nausea, vomiting, diarrhea  OBJECTIVE:  Current Weight: Weight - Scale: 67 1 kg (148 lb)  Vitals:    05/14/21 0900   BP:    Pulse:    Resp:    Temp:    SpO2: 100%       Intake/Output Summary (Last 24 hours) at 5/14/2021 1101  Last data filed at 5/14/2021 0908  Gross per 24 hour   Intake 750 ml   Output 725 ml   Net 25 ml       General:  No acute distress  Skin:  Warm, no rash  Eyes:  Sclerae anicteric  ENT:  Moist lips mucous membranes  Neck:  Supple with trachea midline  Chest:  Clear breath sounds bilaterally   CVS:  Regular rate regular rhythm  Abdomen:  Soft, nontender, normoactive bowel sounds  Extremities:  No overt edema   Neuro:  Awake and interactive  Psych:  Appropriate affect      Medications:  Scheduled Meds:  Current Facility-Administered Medications   Medication Dose Route Frequency Provider Last Rate    acetaminophen  975 mg Oral Q6H [facility] Jessie Mario PA-C      amLODIPine  5 mg Oral Daily Blair Smalls,       atenolol  100 mg Oral Daily Florentino Crespo, DO      gabapentin  300 mg Oral TID Chestine Drone JAD Mario      heparin (porcine)  5,000 Units Subcutaneous Q8H [facility] Jessie Mario PA-C      HYDROmorphone  0 5 mg Intravenous Q2H PRN Chestine Drone JAD Mario      ondansetron  4 mg Intravenous Q6H PRN Chestine Drone JAD Mario      oxyCODONE  10 mg Oral Q4H PRN Chestine Drone MIRANDA Mario-TACO      oxyCODONE  5 mg Oral Q4H PRN Chestine Drone JAD Mario      pantoprazole  40 mg Intravenous Daily Jessie Mario PA-C         PRN Meds:  HYDROmorphone    ondansetron    oxyCODONE    oxyCODONE    Continuous Infusions:     Laboratory Results:  Results from last 7 days   Lab Units 05/14/21  0459 05/13/21  0512   WBC Thousand/uL 7 27 8 06   HEMOGLOBIN g/dL 10 2* 10 5*   HEMATOCRIT % 32 0* 31 7*   PLATELETS Thousands/uL 199 197   SODIUM mmol/L 137 133*   POTASSIUM mmol/L 3 8 2 9*   CHLORIDE mmol/L 102 99*   CO2 mmol/L 27 23   BUN mg/dL 10 14   CREATININE mg/dL 0 90 1 08   CALCIUM mg/dL 8 5 8 0*   MAGNESIUM mg/dL 1 9 1 7   PHOSPHORUS mg/dL 2 1* 3 2

## 2021-05-14 NOTE — UTILIZATION REVIEW
Notification of Discharge   This is a Notification of Discharge from our facility [address]  Please be advised that this patient has been discharge from our facility  Below you will find the admission and discharge date and time including the patients disposition  UTILIZATION REVIEW CONTACT:  Grover Junior  Utilization   Network Utilization Review Department  Phone: [phone] x carefully listen to the prompts  All voicemails are confidential   Email: [email or web address]     PHYSICIAN ADVISORY SERVICES:  FOR [id number] REVIEW - MEDICAL NECESSITY DENIAL  Phone: [phone]  Fax: [phone]  Email: [email or web address]     PRESENTATION DATE: 5/12/2021  6:00 AM  OBERVATION ADMISSION DATE:   INPATIENT ADMISSION DATE: 5/12/21 1144   DISCHARGE DATE: 5/14/2021  1:08 PM  DISPOSITION: Home/Self Care Home/Self Care      IMPORTANT INFORMATION:  Send all requests for admission clinical reviews, approved or denied determinations and any other requests to dedicated fax number below belonging to the campus where the patient is receiving treatment   List of dedicated fax numbers:  [address] DENIALS (Administrative/Medical Necessity) [phone]   [address] (Maternity/NICU/Pediatrics) [phone]   Owen Garces [phone]   Alissa Moles [phone]   Manuel Boone [phone]   [facility] [address] [place] [address] [phone]   [facility]  [phone]   [address] [facility] [address] [facility]  [address] [facility] [address] [facility] [phone]

## 2021-05-14 NOTE — DISCHARGE INSTRUCTIONS
Mar [facility] Instructions  Dr Carleen Benjamin MD, [facility]  [phone]    1  General: You will feel pulling sensations around the wound or funny aches and pains around the incisions  This is normal  Even minor surgery is a change in your body and this is your body's way of reacting to it  If you have had abdominal surgery, it may help to support the incision with a small pillow or blanket for comfort when moving or coughing  2  Wound care:Change dressing over incision sites daily  Remove gauze wick from ostomy incision in 2 days  Packing does not have to be replaced  May shower over wounds after packing removed     Cover with dry gauze to protect staples from pulling on clothing  Use ice for the first 5 days after surgery  Do not use for longer than 20 minutes at a time  Use ice 5 times per day  3  Water: You may shower over the wounds  Do not bathe or use a pool or hot tub until cleared by the physician  If you were discharged with a drain, make sure drain site is covered with plastic wrap before showering  4  Activity: You may go up and down stairs, walk as much as you are comfortable, but walk at least 3 times each day  If you have had abdominal surgery, do not lift anything heavier than 15 lbs for the 1st 2 weeks and 25 lbs for weeks 3 and 4      5  Diet: You may resume a low residu/ low fiber diet  If you had a same-day surgery or overnight stay surgery, you may wish to eat lightly for a few days: soups, crackers, and sandwiches  You may resume a regular diet when ready  6  Medications: Resume all of your previous medications, unless told otherwise by the doctor  Avoid aspirin products for 2-3 days after the date of surgery  You may, at that time, begin to take them again  Use Tylenol and Ibuprofen for pain control  You may alternate these medications every 3 hours    For example: you may take Tylenol at noon, Ibuprofen at 3:00 p m , and Tylenol again at 6:00 p m , etc   You should use ice to assist with pain control as above  You do not need to take narcotic pain medication unless you are having significant pain  7  Driving: You will need someone to drive you home on the day of surgery or discharge  Do not drive or make any important decisions while on narcotic pain medication or 24 hours and after anesthesia or sedation for surgery  Generally, you may drive when your off all narcotic pain medications and you are comfortable  8  Upset Stomach: You may take Maalox, Tums, or similar items for an upset stomach  If your narcotic pain medication causes an upset stomach, do not take it on an empty stomach  Try taking it with at least some crackers or toast      9  Constipation: Patients often experience constipation after surgery  You may take over-the-counter medication for this, such as Metamucil, Senokot, Dulcolax, milk of magnesia, etc  You may take a suppository unless you have had anorectal surgery such as a procedure on your hemorrhoids  If you experience significant nausea or vomiting after abdominal surgery, call the office before trying any of these medications  10  Call the office: If you are experiencing any of the following: fevers above 101 5°, significant nausea or vomiting, if the wound develops drainage and/or there is excessive redness around the wound, or if you have significant diarrhea or other worsening symptoms  11  Pain: You may be given a prescription for pain medication  This will be sent to your pharmacy prior to discharge  12  Sexual Activity: You may resume sexual activity when you feel ready and comfortable and your incision is sealed and healed without apparent infection risk  13  Urination: If you have not urinated in 6 hours, go directly to the ER for evaluation for urinary retention  14  Follow-up in 2 weeks

## 2021-05-17 ENCOUNTER — TELEPHONE (OUTPATIENT)
Dept: SURGERY | Facility: HOSPITAL | Age: 76
End: 2021-05-17

## 2021-05-18 NOTE — DISCHARGE SUMMARY
Discharge Summary - Jesus Trinh 70 y o  female MRN: [id number]    Unit/Bed#: -01 Encounter: [id number]    Admission Date: 5/12/2021     Admitting Diagnosis: Colostomy status [facility]) [Z93 3]    Discharge Date: 5/14/2021    Consulting Physicians: There were no consulting physicians on this admission  HPI:  Ms Chuck Boone is a 68-year-old with past medical history of hypertension, GERD, hyperlipidemia who underwent a Mery's procedure with end colostomy done for perforated diverticulitis on 02/18/2021  She has recovered well  She underwent colonoscopy by her gastroenterologist without significant findings on 05/11/2021  She was admitted on 05/10 12/2021 for colostomy reversal       Procedures Performed: Takedown of colostomy, laparoscopic segmental rectum resection, laparoscopic colorectal anastomosis, extensive lysis of adhesions, intraoperative colonoscopy, bilateral tap block    Hospital Course:  Patient was admitted for the above-stated procedure  The procedure was completed without complications  Postoperatively the patient was transferred to the medical-surgical unit where she recovered well  She remained stable throughout her hospital course  Postoperatively she was started on a clear liquid diet which was quickly advanced to a low residue diet  Patient's pain was well controlled postoperatively  She was able to get out of bed and ambulate  She did have urinary retention requiring straight catheterization but this resolved on postoperative day 1  By postoperative day 2  The patient was tolerating a low residue diet  She was passing flatus and did have a bowel movement  Her pain was controlled with oral pain medication  She was ambulating without assistance  She was cleared for discharged home  Patient received a full set of written discharge instructions which were reviewed with her prior to discharge  Her daughter was called to review these instructions as well    Patient will file low residue diet  She will follow lifting and activity restrictions  She will return to the office in 2 weeks for postoperative follow-up  She will call the office with any questions or concerns prior to that time  Discharge Diagnosis:   1  Anemia, unspecified type    2  Pre-op testing    3  Renal insufficiency    4  Colostomy status ([facility] [address] )    5  History of colostomy reversal        Condition at Discharge: stable     Discharge Medications: See after visit summary for reconciled discharge medications provided to patient and family  Discharge instructions/Information to patient and family:   See after visit summary for information provided to patient and family  Provisions for Follow-Up Care:  See after visit summary for information related to follow-up care and any pertinent home health orders  Disposition: Home    Planned Readmission: No    Discharge Statement   I spent 20 minutes discharging the patient  This time was spent on the day of discharge  I had direct contact with the patient on the day of discharge  Additional documentation is required if more than 30 minutes were spent on discharge  This text is generated with voice recognition software  There may be translation, syntax,  or grammatical errors  If you have any questions, please contact the dictating provider       Signature:   Guerline Mario PA-C  Date: 5/18/2021 Time: 3:59 PM

## 2021-05-19 NOTE — OP NOTE
Indications: Sigmoid Diverticulitis    Pre-operative Diagnosis Sigmoid Diverticulitis status post colostomy  Post-operative Diagnosis: Same     Procedure:  Laparoscopic colostomy reversal, segmental rectal resection, takedown of splenic flexure, intraoperative colonoscopy, colorectal anastomosis, and extensive lysis of adhesions, bilateral TAP blocks    Surgeon: Gloria Spring MD    Assistants: April Fletcher  No qualified resident available  PA is medically necessary for the surgical safety of the case, including suturing, retracting, and hemostasis  Anesthesia: General endotracheal anesthesia      Procedure Details   The patient, after proper facial and armband identification and time out, was placed under general anesthesia with endotracheal tube in satisfactory condition  The patient was then  changed in modified lithotomy position and the abdominal wall was prepped by ChloraPrep and perineum with Betadine  Hawley was inserted  The patient was draped in the sterile usual manner  An elliptical incision was made around the patient's left-sided colostomy and this was deepened down to subcutaneous tissues using cautery  Using careful dissection the colostomy was  from the fascial defect using cautery and sharp dissection and the abdomen was entered  Lysis of adhesions was performed sharply although there appeared to be extensive adhesions and only a limited amount of them could be reached via the small fascial defect  A GelPort was placed at the site of the previous colostomy  A trocar was placed through the GelPort and the abdomen insufflated to appropriate pressure  At this point another 5-mm port in the right upper quadrant and a 12 mm port was inserted in the right lower quadrant using direct visualization     Abdominal laparoscopic exploratory was done with the liver normal in appearance   Extensive adhesions to the anterior abdominal wall of both small bowel and omentum were carefully lysed using sharp dissection as well as Harmonic scalpel  There was extensive adhesions in the pelvis of small bowel to the rectal stump again these were lysed using sharp and Harmonic scalpel dissection  This lysis of adhesions was extensive and took well over 1 hour  The left ureter was identified and the dissection of the descending colon were made on the retroperitoneal area and dissected free from the renal attachments laterally to the lateral wall  This dissection included takedown of splenic flexure which was performed using Harmonic scalpel  Once this was done the descending colon was able to be brought out of the abdomen via the GelPort  The colon was inspected and extensive diverticular disease was noted all the way up to the level of the splenic flexure  The site was selected on the descending colon and the bowel was divided with a kocher clamp and scissors and the mesentery was divided with Harmonic moderate scalpel  The site of anastomosis was on the transverse colon where there was no further diverticular disease the bowel lumen and the wall was well pliable  A 2-0 Prolene pursestring suture was applied on the descending colon satisfactorily  [facility] staple instrument anvil was placed above the pursestring and tied securely with satisfactory pursestring around the rods  The bowel was replaced anatomically and the abdominal cavity and the air insufflation of the abdomen was reinstituted  The transverse colon was then  from its omental attachments using Harmonic scalpel to ensure it would reach the pelvis  Transverse colon would reach into the pelvis for the anastomosis to the top of the rectum without any tension in both end of the bowels had shown satisfactory arterial blood supply  The perineal  inserted the [facility] stapler gun through the anus, which was unable to be brought up and a segment of rectum was resected   The mesentery was divided with harmonic scalpel and the rectum was resected distally at the level just above the peritoneal reflection and passed off as a specimen  Now the stapler could be brought up to the stapled stump of the rectum and on opening the spike, the anvil was placed on the spike and closing it the reach of the 2 ends were satisfactory without any tension  The gun was fired and anastomosis was achieved  Both doughnuts were circular and complete  Using a flexible colonoscope and insufflation of the rectum under water, the anastomosis showed no air bubble and the anastomosis seemed quite satisfactory with well formed staples and no bleeding  The abdominal cavity was irrigated clean with a large amount of saline solution  Hemostasis was well affected  Sponges and counts were correct  Bilateral laparoscopic TAP blocks were performed in standard fashion  Gown and gloves were changed and with new instruments abdominal cavity was closed The 12 mm port site was closed using 0 Vicryl suture laparoscopically  The posterior fascia and peritoneum at the colostomy site were closed using a PDS running suture  Using 1-0 PDS running suture, the anterior fascia on the left-sided colostomy incision was closed  The skin of the colostomy site was partially closed using staples and a portion of the wound was left open and packed with Betadine yessica  The port sites subcutaneous area was irrigated clean and the skin was closed using 4-0 Monocryl subcuticular running suture and/or staples  The patient tolerated the procedure well and was transferred to the recovery room in satisfactory condition       Findings:  Extensive adhesions and descending colon diverticular disease    Estimated Blood Loss: Minimal           Drains: none                Specimens: donuts, portion of rectum, colostomy    Order Name Source Comment Collection Info Order Time   HEMOGLOBIN A1C Arm, Right  Collected By: Robyn Lees RN 3/7/2017  7:31 AM   COMPREHENSIVE METABOLIC PANEL Arm, Right  Collected By: Hyun Torres 3/8/2017  4:00 AM   MAGNESIUM Arm, Right  Collected By: yHun Torres 3/8/2017  4:00 AM   CBC AND PLATELET Arm, Right  Collected By: Hyun Torres 3/8/2017  4:00 AM   PHOSPHORUS Arm, Right  Collected By: Hyun Torres 3/8/2017  4:00 AM   TISSUE EXAM Large Intestine, NOS  Collected By: Kayla De León MD 3/7/2017 10:37 AM            Complications: None; patient tolerated the procedure well             Disposition: PACU            Condition: Stable    Signature:   Kayla De León MD

## 2021-05-27 ENCOUNTER — OFFICE VISIT (OUTPATIENT)
Dept: SURGERY | Facility: HOSPITAL | Age: 76
End: 2021-05-27

## 2021-05-27 VITALS — BODY MASS INDEX: 29.21 KG/M2 | HEIGHT: 60 IN | WEIGHT: 148.8 LBS | TEMPERATURE: 97.5 F

## 2021-05-27 DIAGNOSIS — Z09 POSTOP CHECK: Primary | ICD-10-CM

## 2021-05-27 PROCEDURE — 99024 POSTOP FOLLOW-UP VISIT: CPT | Performed by: SURGERY

## 2021-05-28 NOTE — PROGRESS NOTES
Seen and examined no acute events doing well  avss afebrile  Soft +BS ND NT incision cdi    S/p colostomy reversal  Cont light duty for two more weeks, start high fiber diet, f/u prn

## 2021-07-09 ENCOUNTER — APPOINTMENT (EMERGENCY)
Dept: CT IMAGING | Facility: HOSPITAL | Age: 76
End: 2021-07-09
Payer: COMMERCIAL

## 2021-07-09 ENCOUNTER — HOSPITAL ENCOUNTER (EMERGENCY)
Facility: HOSPITAL | Age: 76
Discharge: HOME | End: 2021-07-09
Attending: EMERGENCY MEDICINE | Admitting: EMERGENCY MEDICINE
Payer: COMMERCIAL

## 2021-07-09 VITALS
RESPIRATION RATE: 17 BRPM | SYSTOLIC BLOOD PRESSURE: 171 MMHG | TEMPERATURE: 97.3 F | WEIGHT: 144 LBS | OXYGEN SATURATION: 99 % | DIASTOLIC BLOOD PRESSURE: 76 MMHG | HEIGHT: 60 IN | BODY MASS INDEX: 28.27 KG/M2 | HEART RATE: 58 BPM

## 2021-07-09 DIAGNOSIS — E87.6 HYPOKALEMIA: ICD-10-CM

## 2021-07-09 DIAGNOSIS — R74.01 TRANSAMINITIS: ICD-10-CM

## 2021-07-09 DIAGNOSIS — R19.7 DIARRHEA: Primary | ICD-10-CM

## 2021-07-09 DIAGNOSIS — R42 LIGHTHEADEDNESS: ICD-10-CM

## 2021-07-09 LAB
ALBUMIN SERPL BCP-MCNC: 3.8 G/DL (ref 3.5–5)
ALP SERPL-CCNC: 225 U/L (ref 46–116)
ALT SERPL W P-5'-P-CCNC: 486 U/L (ref 12–78)
ANION GAP SERPL CALCULATED.3IONS-SCNC: 10 MMOL/L (ref 4–13)
AST SERPL W P-5'-P-CCNC: 141 U/L (ref 5–45)
ATRIAL RATE: 63 BPM
BACTERIA UR QL AUTO: NORMAL /HPF
BASOPHILS # BLD AUTO: 0.04 THOUSANDS/ΜL (ref 0–0.1)
BASOPHILS NFR BLD AUTO: 1 % (ref 0–1)
BILIRUB SERPL-MCNC: 0.7 MG/DL (ref 0.2–1)
BILIRUB UR QL STRIP: NEGATIVE
BUN SERPL-MCNC: 19 MG/DL (ref 5–25)
CALCIUM SERPL-MCNC: 9.1 MG/DL (ref 8.3–10.1)
CHLORIDE SERPL-SCNC: 100 MMOL/L (ref 100–108)
CLARITY UR: CLEAR
CO2 SERPL-SCNC: 29 MMOL/L (ref 21–32)
COLOR UR: YELLOW
COLOR, POC: YELLOW
CREAT SERPL-MCNC: 1.15 MG/DL (ref 0.6–1.3)
EOSINOPHIL # BLD AUTO: 0.17 THOUSAND/ΜL (ref 0–0.61)
EOSINOPHIL NFR BLD AUTO: 3 % (ref 0–6)
ERYTHROCYTE [DISTWIDTH] IN BLOOD BY AUTOMATED COUNT: 13.1 % (ref 11.6–15.1)
EXT BILIRUBIN, UA: NEGATIVE
EXT BLOOD URINE: NEGATIVE
EXT GLUCOSE, UA: NEGATIVE
EXT KETONES: NEGATIVE
EXT NITRITE, UA: NEGATIVE
EXT PH, UA: 5.5
EXT PROTEIN, UA: NEGATIVE
EXT SPECIFIC GRAVITY, UA: 1.01
EXT UROBILINOGEN: 0.2
GFR SERPL CREATININE-BSD FRML MDRD: 47 ML/MIN/1.73SQ M
GLUCOSE SERPL-MCNC: 104 MG/DL (ref 65–140)
GLUCOSE UR STRIP-MCNC: NEGATIVE MG/DL
HCT VFR BLD AUTO: 38.3 % (ref 34.8–46.1)
HGB BLD-MCNC: 12.7 G/DL (ref 11.5–15.4)
HGB UR QL STRIP.AUTO: NEGATIVE
IMM GRANULOCYTES # BLD AUTO: 0.02 THOUSAND/UL (ref 0–0.2)
IMM GRANULOCYTES NFR BLD AUTO: 0 % (ref 0–2)
KETONES UR STRIP-MCNC: NEGATIVE MG/DL
LEUKOCYTE ESTERASE UR QL STRIP: ABNORMAL
LIPASE SERPL-CCNC: 125 U/L (ref 73–393)
LYMPHOCYTES # BLD AUTO: 1.52 THOUSANDS/ΜL (ref 0.6–4.47)
LYMPHOCYTES NFR BLD AUTO: 28 % (ref 14–44)
MCH RBC QN AUTO: 29.2 PG (ref 26.8–34.3)
MCHC RBC AUTO-ENTMCNC: 33.2 G/DL (ref 31.4–37.4)
MCV RBC AUTO: 88 FL (ref 82–98)
MONOCYTES # BLD AUTO: 0.43 THOUSAND/ΜL (ref 0.17–1.22)
MONOCYTES NFR BLD AUTO: 8 % (ref 4–12)
NEUTROPHILS # BLD AUTO: 3.23 THOUSANDS/ΜL (ref 1.85–7.62)
NEUTS SEG NFR BLD AUTO: 60 % (ref 43–75)
NITRITE UR QL STRIP: NEGATIVE
NON-SQ EPI CELLS URNS QL MICRO: NORMAL /HPF
NRBC BLD AUTO-RTO: 0 /100 WBCS
P AXIS: 59 DEGREES
PH UR STRIP.AUTO: 5.5 [PH] (ref 4.5–8)
PLATELET # BLD AUTO: 242 THOUSANDS/UL (ref 149–390)
PMV BLD AUTO: 9.4 FL (ref 8.9–12.7)
POTASSIUM SERPL-SCNC: 3.2 MMOL/L (ref 3.5–5.3)
PR INTERVAL: 164 MS
PROT SERPL-MCNC: 7.9 G/DL (ref 6.4–8.2)
PROT UR STRIP-MCNC: NEGATIVE MG/DL
QRS AXIS: 46 DEGREES
QRSD INTERVAL: 82 MS
QT INTERVAL: 444 MS
QTC INTERVAL: 454 MS
RBC # BLD AUTO: 4.35 MILLION/UL (ref 3.81–5.12)
RBC #/AREA URNS AUTO: NORMAL /HPF
SODIUM SERPL-SCNC: 139 MMOL/L (ref 136–145)
SP GR UR STRIP.AUTO: 1.01 (ref 1–1.03)
T WAVE AXIS: 65 DEGREES
UROBILINOGEN UR QL STRIP.AUTO: 0.2 E.U./DL
VENTRICULAR RATE: 63 BPM
WBC # BLD AUTO: 5.41 THOUSAND/UL (ref 4.31–10.16)
WBC # BLD EST: NORMAL 10*3/UL
WBC #/AREA URNS AUTO: NORMAL /HPF

## 2021-07-09 PROCEDURE — 93005 ELECTROCARDIOGRAM TRACING: CPT

## 2021-07-09 PROCEDURE — 99284 EMERGENCY DEPT VISIT MOD MDM: CPT | Performed by: EMERGENCY MEDICINE

## 2021-07-09 PROCEDURE — 81001 URINALYSIS AUTO W/SCOPE: CPT

## 2021-07-09 PROCEDURE — 36415 COLL VENOUS BLD VENIPUNCTURE: CPT

## 2021-07-09 PROCEDURE — 85025 COMPLETE CBC W/AUTO DIFF WBC: CPT

## 2021-07-09 PROCEDURE — G1004 CDSM NDSC: HCPCS

## 2021-07-09 PROCEDURE — 96361 HYDRATE IV INFUSION ADD-ON: CPT

## 2021-07-09 PROCEDURE — 96374 THER/PROPH/DIAG INJ IV PUSH: CPT

## 2021-07-09 PROCEDURE — 93010 ELECTROCARDIOGRAM REPORT: CPT | Performed by: INTERNAL MEDICINE

## 2021-07-09 PROCEDURE — 80053 COMPREHEN METABOLIC PANEL: CPT

## 2021-07-09 PROCEDURE — 74177 CT ABD & PELVIS W/CONTRAST: CPT

## 2021-07-09 PROCEDURE — 99284 EMERGENCY DEPT VISIT MOD MDM: CPT

## 2021-07-09 PROCEDURE — 83690 ASSAY OF LIPASE: CPT

## 2021-07-09 RX ORDER — ONDANSETRON 2 MG/ML
4 INJECTION INTRAMUSCULAR; INTRAVENOUS ONCE
Status: COMPLETED | OUTPATIENT
Start: 2021-07-09 | End: 2021-07-09

## 2021-07-09 RX ORDER — POTASSIUM CHLORIDE 20 MEQ/1
40 TABLET, EXTENDED RELEASE ORAL ONCE
Status: COMPLETED | OUTPATIENT
Start: 2021-07-09 | End: 2021-07-09

## 2021-07-09 RX ADMIN — POTASSIUM CHLORIDE 40 MEQ: 1500 TABLET, EXTENDED RELEASE ORAL at 11:46

## 2021-07-09 RX ADMIN — IOHEXOL 100 ML: 350 INJECTION, SOLUTION INTRAVENOUS at 10:54

## 2021-07-09 RX ADMIN — ONDANSETRON 4 MG: 2 INJECTION INTRAMUSCULAR; INTRAVENOUS at 09:46

## 2021-07-09 RX ADMIN — SODIUM CHLORIDE 1000 ML: 0.9 INJECTION, SOLUTION INTRAVENOUS at 09:46

## 2021-07-09 NOTE — ED PROVIDER NOTES
History  Chief Complaint   Patient presents with    Diarrhea     Pt c/o of diarehha for four days  Patient is a 61-year-old female with past medical history significant for hyperlipidemia, hypertension, status post Mery's procedure with end colostomy for perforated diverticulitis on 2021, status post reversal of colostomy on 05/10/2021, who presents with diarrhea x 4 days  Patient reports that 4 days ago she developed diarrhea, non-bloody, non-mucoid  She states multiple episodes daily  Patient states that she has been trying to drink fluids and Gatorade, but today when she got up she felt weaker than normal and became briefly lightheaded  She complains of mild nausea, but no vomiting  Denies abdominal pain, fevers, chills, blood in the stool, burning with urination, vaginal discharge, chest pain, dizziness, shortness of breath  Patient daughter had visited a few days prior to start of symptoms and was sick with vomiting and diarrhea  No recent travel  No recent antibiotics  Prior to Admission Medications   Prescriptions Last Dose Informant Patient Reported? Taking?    Probiotic Product (PROBIOTIC DAILY PO)   Yes No   Sig: Take by mouth daily 21 Is currently taking with  Cephalexin ATB therapy - will finish on 21   acetaminophen (TYLENOL) 500 mg tablet  Self Yes No   Sig: Take 500 mg by mouth every 6 (six) hours as needed for mild pain   amLODIPine (NORVASC) 5 mg tablet  Self Yes No   Sig: Take 5 mg by mouth daily    atenolol-chlorthalidone (TENORETIC) 100-25 mg per tablet  Self Yes No   Sig: Take 1 tablet by mouth daily    gabapentin (NEURONTIN) 300 mg capsule  Self Yes No   Si (two) times a day   ibuprofen (MOTRIN) 200 mg tablet   No No   Sig: Take 3 tablets (600 mg total) by mouth every 6 (six) hours as needed for moderate pain   Patient not taking: Reported on 2021   losartan (COZAAR) 100 MG tablet  Self Yes No   Sig: Take 100 mg by mouth daily   omeprazole (PriLOSEC OTC) 20 MG tablet  Self Yes No   Sig: Take 20 mg by mouth daily as needed   simvastatin (ZOCOR) 20 mg tablet  Self Yes No   Sig: Take 20 mg by mouth daily at bedtime    zolpidem (AMBIEN) 10 mg tablet  Self Yes No   Sig: as needed       Facility-Administered Medications: None       Past Medical History:   Diagnosis Date    Allergic rhinitis     Basal cell carcinoma (BCC)     Pt states with BCC - had it removed by derm      Colostomy in place [facility])     DDD (degenerative disc disease), lumbar     Diverticulitis large intestine w/o perforation or abscess w/o bleeding     GERD (gastroesophageal reflux disease)     Hemorrhoids     Hernia of abdominal cavity 03/18/2020    History of gallstones     Hyperlipidemia     Hypertension     Lyme disease 1988    Pneumonia     Hx of pneumonia x1       Past Surgical History:   Procedure Laterality Date    BAND HEMORRHOIDECTOMY  07/08/2020    CHOLECYSTECTOMY      COLONOSCOPY      COLOSTOMY Left 2/18/2021    Procedure: COLOSTOMY END creation;  Surgeon: Porter Sena MD;  Location: [facility];  Service: General    FINGER SURGERY      RIGHT SECOND FINGER-"TRIGGER FINGER"    LAPAROTOMY N/A 2/18/2021    Procedure: LAPAROTOMY EXPLORATORY;  Surgeon: Porter Sena MD;  Location: [facility];  Service: General    PILONIDAL CYST EXCISION      TX CLOSE ENTEROSTOMY N/A 5/12/2021    Procedure: COLOSTOMY TAKEDOWN / REVERSE GOLDEN, laparocopic anastomosis of colon, Extensive lysis of adhesions, intraop colonoscopy, Bilateral Tap block, Segmental rectum rection;  Surgeon: Lady Mati MD;  Location: [facility];  Service: General    TX LAP,DIAGNOSTIC ABDOMEN N/A 2/18/2021    Procedure: LAPAROSCOPY DIAGNOSTIC;  Surgeon: Porter Sena MD;  Location: [facility];  Service: General    TX Alisa Frey 19 Right 7/14/2020    Procedure: REPAIR HERNIA INGUINAL and femoral hernia, LAPAROSCOPIC;  Surgeon: Lady Mati MD;  Location: [facility]; Service: General    IN PART REMOVAL COLON W ANASTOMOSIS N/A 2021    Procedure: RESECTION COLON SIGMOID;  Surgeon: Cam Ho MD;  Location:  MAIN OR;  Service: General    SHOULDER SURGERY Right        Family History   Problem Relation Age of Onset    Coronary artery disease Mother     Hypertension Mother     Hypotension Father     Emphysema Father     Prostate cancer Father      I have reviewed and agree with the history as documented  E-Cigarette/Vaping    E-Cigarette Use Never User     Comments Denies       E-Cigarette/Vaping Substances     Social History     Tobacco Use    Smoking status: Former Smoker     Packs/day: 0 50     Years: 5 00     Pack years: 2 50     Types: Cigarettes     Quit date:      Years since quittin 5    Smokeless tobacco: Never Used   Vaping Use    Vaping Use: Never used   Substance Use Topics    Alcohol use: Not Currently     Comment: Denies any use currently     Drug use: Not Currently     Comment: Denies       Review of Systems   Constitutional: Negative for chills and fever  HENT: Negative for congestion and rhinorrhea  Eyes: Negative for photophobia and visual disturbance  Respiratory: Negative for cough and shortness of breath  Cardiovascular: Negative for chest pain, palpitations and leg swelling  Gastrointestinal: Positive for diarrhea and nausea  Negative for abdominal distention, abdominal pain, blood in stool, constipation and vomiting  Genitourinary: Negative for dysuria, flank pain and hematuria  Musculoskeletal: Negative for back pain and neck pain  Skin: Negative for color change and pallor  Neurological: Positive for light-headedness  Negative for dizziness, weakness, numbness and headaches  Physical Exam  Physical Exam  Vitals and nursing note reviewed  Constitutional:       General: She is not in acute distress  Appearance: Normal appearance  She is obese   She is not ill-appearing, toxic-appearing or diaphoretic  HENT:      Head: Normocephalic and atraumatic  Mouth/Throat:      Mouth: Mucous membranes are moist    Eyes:      Extraocular Movements: Extraocular movements intact  Conjunctiva/sclera: Conjunctivae normal       Pupils: Pupils are equal, round, and reactive to light  Cardiovascular:      Rate and Rhythm: Normal rate and regular rhythm  Pulses: Normal pulses  Heart sounds: Normal heart sounds  No murmur heard  Pulmonary:      Effort: Pulmonary effort is normal  No respiratory distress  Breath sounds: Normal breath sounds  No stridor  No wheezing, rhonchi or rales  Chest:      Chest wall: No tenderness  Abdominal:      General: Bowel sounds are normal  There is no distension  Palpations: Abdomen is soft  Tenderness: There is no abdominal tenderness  There is no right CVA tenderness, left CVA tenderness, guarding or rebound  Musculoskeletal:      Cervical back: Normal range of motion and neck supple  Right lower leg: No edema  Left lower leg: No edema  Skin:     General: Skin is warm and dry  Neurological:      General: No focal deficit present  Mental Status: She is alert and oriented to person, place, and time  Mental status is at baseline  GCS: GCS eye subscore is 4  GCS verbal subscore is 5  GCS motor subscore is 6  Coordination: Finger-Nose-Finger Test normal       Gait: Gait is intact     Psychiatric:         Mood and Affect: Mood normal          Behavior: Behavior normal          Vital Signs  ED Triage Vitals   Temperature Pulse Respirations Blood Pressure SpO2   07/09/21 0916 07/09/21 0916 07/09/21 0916 07/09/21 0918 07/09/21 0916   (!) 97 3 °F (36 3 °C) 70 18 (!) 183/73 99 %      Temp Source Heart Rate Source Patient Position - Orthostatic VS BP Location FiO2 (%)   07/09/21 0916 07/09/21 0916 07/09/21 1130 07/09/21 1130 --   Tympanic Monitor Lying Left arm       Pain Score       --                  Vitals:    07/09/21 7663 07/09/21 0918 07/09/21 0930 07/09/21 1130   BP:  (!) 183/73 148/67 (!) 171/76   Pulse: 70  63 58   Patient Position - Orthostatic VS:    Lying         Visual Acuity      ED Medications  Medications   sodium chloride 0 9 % bolus 1,000 mL (0 mL Intravenous Stopped 7/9/21 1046)   ondansetron (ZOFRAN) injection 4 mg (4 mg Intravenous Given 7/9/21 0946)   iohexol (OMNIPAQUE) 350 MG/ML injection (SINGLE-DOSE) 100 mL (100 mL Intravenous Given 7/9/21 1054)   potassium chloride (K-DUR,KLOR-CON) CR tablet 40 mEq (40 mEq Oral Given 7/9/21 1146)       Diagnostic Studies  Results Reviewed     Procedure Component Value Units Date/Time    Comprehensive metabolic panel [[id number]]  (Abnormal) Collected: 07/09/21 0922    Lab Status: Final result Specimen: Blood from Arm, Right Updated: 07/09/21 1020     Sodium 139 mmol/L      Potassium 3 2 mmol/L      Chloride 100 mmol/L      CO2 29 mmol/L      ANION GAP 10 mmol/L      BUN 19 mg/dL      Creatinine 1 15 mg/dL      Glucose 104 mg/dL      Calcium 9 1 mg/dL       U/L       U/L      Alkaline Phosphatase 225 U/L      Total Protein 7 9 g/dL      Albumin 3 8 g/dL      Total Bilirubin 0 70 mg/dL      eGFR 47 ml/min/1 73sq m     Narrative:      [place] guidelines for Chronic Kidney Disease (CKD):     Stage 1 with normal or high GFR (GFR > 90 mL/min/1 73 square meters)    Stage 2 Mild CKD (GFR = 60-89 mL/min/1 73 square meters)    Stage 3A Moderate CKD (GFR = 45-59 mL/min/1 73 square meters)    Stage 3B Moderate CKD (GFR = 30-44 mL/min/1 73 square meters)    Stage 4 Severe CKD (GFR = 15-29 mL/min/1 73 square meters)    Stage 5 End Stage CKD (GFR <15 mL/min/1 73 square meters)  Note: GFR calculation is accurate only with a steady state creatinine    Urine Microscopic [[id number]]  (Normal) Collected: 07/09/21 0956    Lab Status: Final result Specimen: Urine, Clean Catch Updated: 07/09/21 1017     RBC, UA None Seen /hpf      WBC, UA 2-4 /hpf Epithelial Cells Occasional /hpf      Bacteria, UA Occasional /hpf     Lipase [[id number]]  (Normal) Collected: 07/09/21 0922    Lab Status: Final result Specimen: Blood from Arm, Right Updated: 07/09/21 1017     Lipase 125 u/L     POCT urinalysis dipstick [[id number]]  (Normal) Resulted: 07/09/21 1002    Lab Status: Final result Specimen: Urine Updated: 07/09/21 1003     Color, UA yellow     Glucose, UA (Ref: Negative) negative     Bilirubin, UA (Ref: Negative) negative     Ketones, UA (Ref: Negative) negative     Spec Grav, UA (Ref:1 003-1 030) 1 010     Blood, UA (Ref: Negative) negative     pH, UA (Ref: 4 5-8 0) 5 5     Protein, UA (Ref: Negative) negative     Urobilinogen, UA (Ref: 0 2- 1 0) 0 2      Leukocytes, UA (Ref: Negative) small     Nitrite, UA (Ref: Negative) negative    Urine Macroscopic, POC [[id number]]  (Abnormal) Collected: 07/09/21 0956    Lab Status: Final result Specimen: Urine Updated: 07/09/21 0957     Color, UA Yellow     Clarity, UA Clear     pH, UA 5 5     Leukocytes, UA Small     Nitrite, UA Negative     Protein, UA Negative mg/dl      Glucose, UA Negative mg/dl      Ketones, UA Negative mg/dl      Urobilinogen, UA 0 2 E U /dl      Bilirubin, UA Negative     Blood, UA Negative     Specific Gravity, UA 1 010    CBC and differential [[id number]] Collected: 07/09/21 0922    Lab Status: Final result Specimen: Blood from Arm, Right Updated: 07/09/21 0928     WBC 5 41 Thousand/uL      RBC 4 35 Million/uL      Hemoglobin 12 7 g/dL      Hematocrit 38 3 %      MCV 88 fL      MCH 29 2 pg      MCHC 33 2 g/dL      RDW 13 1 %      MPV 9 4 fL      Platelets 324 Thousands/uL      nRBC 0 /100 WBCs      Neutrophils Relative 60 %      Immat GRANS % 0 %      Lymphocytes Relative 28 %      Monocytes Relative 8 %      Eosinophils Relative 3 %      Basophils Relative 1 %      Neutrophils Absolute 3 23 Thousands/µL      Immature Grans Absolute 0 02 Thousand/uL      Lymphocytes Absolute 1 52 Thousands/µL Monocytes Absolute 0 43 Thousand/µL      Eosinophils Absolute 0 17 Thousand/µL      Basophils Absolute 0 04 Thousands/µL                  CT abdomen pelvis with contrast   Final Result by Jovanna Jarvis DO (07/09 1130)      No acute intra-abdominal abnormality  Colonic diverticulosis without evidence of diverticulitis  Workstation performed: [id number]                    Procedures  ECG 12 Lead Documentation Only    Date/Time: 7/9/2021 12:17 PM  Performed by: Nahid Whipple DO  Authorized by: Nahid Whipple DO     Indications / Diagnosis:  Lightheaded episode  Patient location:  ED  Previous ECG:     Previous ECG:  Compared to current    Comparison ECG info:  2/18/2021    Similarity:  No change  Interpretation:     Interpretation: non-specific    Rate:     ECG rate:  63    ECG rate assessment: normal    Rhythm:     Rhythm: sinus rhythm    Ectopy:     Ectopy: none    QRS:     QRS axis:  Normal    QRS intervals:  Normal  Conduction:     Conduction: normal    ST segments:     ST segments:  Non-specific    Depression:  II and III  T waves:     T waves: non-specific    Comments:                   ED Course  ED Course as of Jul 09 1221   Fri Jul 09, 2021   1141 Potassium(!): 3 2                             SBIRT 22yo+      Most Recent Value   SBIRT (23 yo +)   In order to provide better care to our patients, we are screening all of our patients for alcohol and drug use  Would it be okay to ask you these screening questions? No Filed at: 07/09/2021 0935                    MDM  Number of Diagnoses or Management Options  Diarrhea  Hypokalemia  Lightheadedness  Transaminitis  Diagnosis management comments: Assessment and Plan:  70year old F presenting with diarrhea x 4 days  Likely viral enterititis  As patient has significant history of recent surgery, will get CT imaging to rule out diverticulitis/ post operative complication   Will check labs to assess for electrolyte abnormalities, kidney and liver function  EKG in setting of lightheaded episode to assess for arrhythmia, though more likely related to orthostatics/dehydration as it happened when she got up, but not again since then  Discussed with patient that CT scan shows only diverticulosis which she is aware of  Labs showed a mild hypokalemia, which was repleted and is likely secondary to the diarrhea  Also there is bit of a transaminitis  As patient has no abdominal pain, and is s/p cholecystectomy, and normal t bili, recommended follow up with outpatient PCP for repeat blood work- if normalizing once diarrhea resolved, then no need to follow up with GI, but if continues to be elevated, may need GI follow up  Discussed that hepA diarrhea could cause slight liver function elevation  Counseled regarding strict return precautions which patient verbalized understanding of  Disposition  Final diagnoses:   Diarrhea   Lightheadedness   Hypokalemia   Transaminitis     Time reflects when diagnosis was documented in both MDM as applicable and the Disposition within this note     Time User Action Codes Description Comment    7/9/2021 11:33 AM Pili Frieda Add [R19 7] Diarrhea     7/9/2021 11:33 AM Pili Frieda Add [R42] Lightheadedness     7/9/2021 11:42 AM Pili Frieda Add [E87 6] Hypokalemia     7/9/2021 11:49 AM Pili Frieda Add [R74 01] Transaminitis       ED Disposition     ED Disposition Condition Date/Time Comment    Discharge Stable Fri Jul 9, 2021 11:33 AM Og Rodriguez discharge to home/self care              Follow-up Information     Follow up With Specialties Details Why Contact Info Additional Information    Jeffery Shaw DO Family Medicine Schedule an appointment as soon as possible for a visit in 3 days for re-evaluation [facility] [address] [facility] Emergency Department Emergency Medicine Go to  As needed, If symptoms worsen, for re-evaluation [address] [facility] [address]  [address] [facility] Emergency Department, [address], [place], [address]    [address] [facility]astro[facility] Schedule an appointment as soon as possible for a visit in 1 week for re-evaluation [address] [place] [address]  [facility] [place], [address], [address] [facility], [address] [phone]          Discharge Medication List as of 7/9/2021 11:49 AM      CONTINUE these medications which have NOT CHANGED    Details   acetaminophen (TYLENOL) 500 mg tablet Take 500 mg by mouth every 6 (six) hours as needed for mild pain, Historical Med      amLODIPine (NORVASC) 5 mg tablet Take 5 mg by mouth daily , Starting Mon 3/16/2020, Historical Med      atenolol-chlorthalidone (TENORETIC) 100-25 mg per tablet Take 1 tablet by mouth daily , Starting Mon 3/16/2020, Historical Med      gabapentin (NEURONTIN) 300 mg capsule 2 (two) times a day, Historical Med      ibuprofen (MOTRIN) 200 mg tablet Take 3 tablets (600 mg total) by mouth every 6 (six) hours as needed for moderate pain, Starting Fri 5/14/2021, No Print      losartan (COZAAR) 100 MG tablet Take 100 mg by mouth daily, Historical Med      omeprazole (PriLOSEC OTC) 20 MG tablet Take 20 mg by mouth daily as needed, Historical Med      Probiotic Product (PROBIOTIC DAILY PO) Take by mouth daily 5/4/21 Is currently taking with  Cephalexin ATB therapy - will finish on 5/6/21, Historical Med      simvastatin (ZOCOR) 20 mg tablet Take 20 mg by mouth daily at bedtime , Starting Mon 3/16/2020, Historical Med      zolpidem (AMBIEN) 10 mg tablet as needed , Starting Mon 3/2/2020, Historical Med           No discharge procedures on file      PDMP Review       Value Time User    PDMP Reviewed  Yes 5/14/2021  9:10 AM [place] Angelique Mario PA-C ED Provider  Electronically Signed by           Adina Wood DO  07/09/21 3540

## 2021-07-30 ENCOUNTER — OFFICE VISIT (OUTPATIENT)
Dept: URGENT CARE | Facility: CLINIC | Age: 76
End: 2021-07-30
Payer: COMMERCIAL

## 2021-07-30 VITALS
BODY MASS INDEX: 28.07 KG/M2 | HEIGHT: 60 IN | RESPIRATION RATE: 16 BRPM | WEIGHT: 143 LBS | DIASTOLIC BLOOD PRESSURE: 80 MMHG | SYSTOLIC BLOOD PRESSURE: 138 MMHG | TEMPERATURE: 97.2 F | OXYGEN SATURATION: 97 % | HEART RATE: 52 BPM

## 2021-07-30 DIAGNOSIS — M77.11 LATERAL EPICONDYLITIS OF RIGHT ELBOW: Primary | ICD-10-CM

## 2021-07-30 PROCEDURE — 99213 OFFICE O/P EST LOW 20 MIN: CPT | Performed by: PHYSICIAN ASSISTANT
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
## 2021-07-30 NOTE — PATIENT INSTRUCTIONS
Tennis Elbow   AMBULATORY CARE:   Tennis elbow  is inflammation of the tendons in your elbow  Tendons are strong tissues that connect muscle to bone  Common symptoms include the following:   · Pain on the side of your elbow that travels to your upper arm, forearm, or fingers    · Weakness in your wrist or hand    · Trouble holding, lifting, or grabbing an object, such as a coffee cup    · Red, swollen, warm skin on the outside of your elbow    Seek care immediately if:   · You suddenly have no feeling in your arm, hand, or fingers  · You suddenly cannot move your arm, wrist, hand, or fingers  Contact your healthcare provider if:   · Your symptoms do not get better within 2 weeks, even with treatment  · You have more pain or weakness in your arm, wrist, hand, or fingers  · You have new numbness or tingling in your arm, hand, or fingers  · You have questions or concerns about your condition or care  Treatment:   · Support devices  may be needed to limit your arm movement  Examples include an arm strap, brace, or splint  These devices also help decrease pain and prevent more damage to your tendon  · Acetaminophen  decreases pain and fever  It is available without a doctor's order  Ask how much to take and how often to take it  Follow directions  Read the labels of all other medicines you are using to see if they also contain acetaminophen, or ask your doctor or pharmacist  Acetaminophen can cause liver damage if not taken correctly  Do not use more than 4 grams (4,000 milligrams) total of acetaminophen in one day  · NSAIDs , such as ibuprofen, help decrease swelling, pain, and fever  This medicine is available with or without a doctor's order  NSAIDs can cause stomach bleeding or kidney problems in certain people  If you take blood thinner medicine, always ask your healthcare provider if NSAIDs are safe for you  Always read the medicine label and follow directions      · A steroid injection will help decrease pain and swelling  · Physical therapy  may be recommended  A physical therapist teaches you exercises to help improve movement and strength, and to decrease pain  · Surgery  may be needed if your symptoms do not improve with other treatments  During surgery, your healthcare provider will remove any damaged tissue  He may also cut your tendon and reattach it  Self-care:   · Rest  your injured arm and avoid activities that cause pain  This will help your tendons heal     · Apply ice  on your elbow for 15 to 20 minutes every hour or as directed  Use an ice pack, or put crushed ice in a plastic bag  Cover it with a towel before you apply it to your skin  Ice helps prevent tissue damage and decreases swelling and pain  · Elevate  your elbow above the level of your heart as often as you can  This will help decrease swelling and pain  Prop your elbow on pillows or blankets to keep it elevated comfortably  Follow up with your healthcare provider as directed:  Write down your questions so you remember to ask them during your visits  © Copyright [employer] 2021 Information is for End User's use only and may not be sold, redistributed or otherwise used for commercial purposes  All illustrations and images included in CareNotes® are the copyrighted property of A D A M , Inc  or [facility] Concetta Erwin   The above information is an  only  It is not intended as medical advice for individual conditions or treatments  Talk to your doctor, nurse or pharmacist before following any medical regimen to see if it is safe and effective for you

## 2021-09-23 ENCOUNTER — OFFICE VISIT (OUTPATIENT)
Dept: SURGERY | Facility: HOSPITAL | Age: 76
End: 2021-09-23
Payer: COMMERCIAL

## 2021-09-23 VITALS
HEIGHT: 60 IN | BODY MASS INDEX: 29.17 KG/M2 | HEART RATE: 47 BPM | SYSTOLIC BLOOD PRESSURE: 166 MMHG | DIASTOLIC BLOOD PRESSURE: 73 MMHG | WEIGHT: 148.6 LBS | RESPIRATION RATE: 16 BRPM | TEMPERATURE: 96.9 F

## 2021-09-23 DIAGNOSIS — R10.9 ABDOMINAL PAIN, UNSPECIFIED ABDOMINAL LOCATION: Primary | ICD-10-CM

## 2021-09-23 PROCEDURE — 99213 OFFICE O/P EST LOW 20 MIN: CPT | Performed by: SURGERY

## 2021-09-24 ENCOUNTER — TELEPHONE (OUTPATIENT)
Dept: OBGYN CLINIC | Facility: CLINIC | Age: 76
End: 2021-09-24

## 2021-09-30 ENCOUNTER — OFFICE VISIT (OUTPATIENT)
Dept: OBGYN CLINIC | Facility: CLINIC | Age: 76
End: 2021-09-30
Payer: COMMERCIAL

## 2021-09-30 ENCOUNTER — VBI (OUTPATIENT)
Dept: ADMINISTRATIVE | Facility: OTHER | Age: 76
End: 2021-09-30

## 2021-09-30 VITALS — SYSTOLIC BLOOD PRESSURE: 140 MMHG | WEIGHT: 148 LBS | DIASTOLIC BLOOD PRESSURE: 70 MMHG | BODY MASS INDEX: 28.9 KG/M2

## 2021-09-30 DIAGNOSIS — R10.2 PELVIC PAIN: Primary | ICD-10-CM

## 2021-09-30 DIAGNOSIS — R14.0 ABDOMINAL BLOATING: ICD-10-CM

## 2021-09-30 PROCEDURE — 99213 OFFICE O/P EST LOW 20 MIN: CPT | Performed by: NURSE PRACTITIONER

## 2021-09-30 NOTE — TELEPHONE ENCOUNTER
Upon review of the In Basket request we were able to locate, review, and update the patient chart as requested for DEXA Scan, Mammogram and Pap Smear (HPV) aka Cervical Cancer Screening  Any additional questions or concerns should be emailed to the Practice Liaisons via [email or web address] email, please do not reply via In Basket      Thank you  Stuart Worrell

## 2021-10-06 ENCOUNTER — ULTRASOUND (OUTPATIENT)
Dept: OBGYN CLINIC | Facility: CLINIC | Age: 76
End: 2021-10-06
Payer: COMMERCIAL

## 2021-10-06 DIAGNOSIS — R14.0 BLOATING: ICD-10-CM

## 2021-10-06 DIAGNOSIS — R52 PAIN: ICD-10-CM

## 2021-10-06 PROCEDURE — 76830 TRANSVAGINAL US NON-OB: CPT | Performed by: OBSTETRICS & GYNECOLOGY

## 2022-10-21 ENCOUNTER — ANNUAL EXAM (OUTPATIENT)
Dept: OBGYN CLINIC | Facility: CLINIC | Age: 77
End: 2022-10-21

## 2022-10-21 VITALS
HEIGHT: 60 IN | BODY MASS INDEX: 29.13 KG/M2 | SYSTOLIC BLOOD PRESSURE: 105 MMHG | DIASTOLIC BLOOD PRESSURE: 50 MMHG | WEIGHT: 148.4 LBS

## 2022-10-21 DIAGNOSIS — Z01.419 GYNECOLOGIC EXAM NORMAL: Primary | ICD-10-CM

## 2022-10-21 DIAGNOSIS — Z12.31 ENCOUNTER FOR SCREENING MAMMOGRAM FOR MALIGNANT NEOPLASM OF BREAST: ICD-10-CM

## 2022-10-21 PROBLEM — K57.32 DIVERTICULITIS LARGE INTESTINE: Status: ACTIVE | Noted: 2021-02-17

## 2022-10-21 RX ORDER — ATENOLOL AND CHLORTHALIDONE TABLET 50; 25 MG/1; MG/1
1 TABLET ORAL DAILY
COMMUNITY
Start: 2022-09-08

## 2022-10-21 RX ORDER — POTASSIUM CHLORIDE 20 MEQ/1
20 TABLET, EXTENDED RELEASE ORAL DAILY
COMMUNITY
Start: 2022-08-24

## 2022-10-21 NOTE — PROGRESS NOTES
Assessment/Plan   Problem List Items Addressed This Visit        Other    Gynecologic exam normal - Primary     Pap guidelines reviewed  Pap smears no longer indicated in this low risk patient over the age of 72 with history of normal pap smears  Continue yearly mammograms, script given  Reviewed uterine prolapse seen on exam  Patient reports has discuss options of pessary, surgery, physical therapy in the past  Does not have any symptoms from it, will continue to monitor  Recommend Calcium 1200mg in two divided doses  Vitamin D3 2,000-4,000 IU daily   Weight bearing exercises 3-4x's a week for 30-40min  Return to office in 2 years for annual or as needed  Other Visit Diagnoses     Encounter for screening mammogram for malignant neoplasm of breast        Relevant Orders    Mammo screening bilateral w 3d & cad          Subjective:     Patient ID: Zonia Bapitste is a 78 y o  y o  female  HPI  79 yo seen for annual exam  Menopausal, denies  Bleeding  Denies bowel or bladder issues  Was seen 9/2021 for pelvic pain, ultrasound was normal  Reports pain is better, was thought to be secondary to scar tissue from her previous abdominal surgeries  Last pap: none on file, reports always normal    Last DEXA scan: 9/30/2021 normal    The following portions of the patient's history were reviewed and updated as appropriate:   She  has a past medical history of Allergic rhinitis, Arthritis, Basal cell carcinoma (BCC), Colostomy in place [facility]), DDD (degenerative disc disease), lumbar, Diverticulitis large intestine w/o perforation or abscess w/o bleeding, GERD (gastroesophageal reflux disease), Hemorrhoids, Hernia of abdominal cavity (03/18/2020), History of gallstones, Hyperlipidemia, Hypertension, Lyme disease (1988), Papanicolaou smear (2018), Pneumonia, and Skin cancer    She   Patient Active Problem List    Diagnosis Date Noted   • Gynecologic exam normal 10/21/2022   • Hypertension    • DDD (degenerative disc disease), lumbar    • Hyperlipidemia    • Diverticulitis large intestine 02/17/2021   • Non-recurrent unilateral inguinal hernia without obstruction or gangrene    • Abnormal urine findings 04/11/2015   • Increased frequency of urination 04/11/2015   • Lower urinary tract infectious disease 04/11/2015     She  has a past surgical history that includes Shoulder surgery (Right); Cholecystectomy; PILONIDAL CYST EXCISION; Finger surgery; Band hemorrhoidectomy (07/08/2020); Colonoscopy; pr lap,inguinal hernia repr,initial (Right, 07/14/2020); pr lap,diagnostic abdomen (N/A, 02/18/2021); pr part removal colon w anastomosis (N/A, 02/18/2021); LAPAROTOMY (N/A, 02/18/2021); Colostomy (Left, 02/18/2021); pr close enterostomy (N/A, 05/12/2021); DXA procedure(historical); Mammo (historical); and VULVA BIOPSY  Her family history includes Coronary artery disease in her mother; Emphysema in her father; Hypertension in her mother; Hypotension in her father; Osteoarthritis in her mother; Prostate cancer in her father  She  reports that she quit smoking about 52 years ago  Her smoking use included cigarettes  She has a 2 50 pack-year smoking history  She has never used smokeless tobacco  She reports previous alcohol use  She reports previous drug use    Current Outpatient Medications   Medication Sig Dispense Refill   • acetaminophen (TYLENOL) 500 mg tablet Take 500 mg by mouth every 6 (six) hours as needed for mild pain     • amLODIPine (NORVASC) 5 mg tablet Take 5 mg by mouth daily      • gabapentin (NEURONTIN) 300 mg capsule 2 (two) times a day     • losartan (COZAAR) 100 MG tablet Take 100 mg by mouth daily     • Probiotic Product (PROBIOTIC DAILY PO) Take by mouth daily 5/4/21 Is currently taking with  Cephalexin ATB therapy - will finish on 5/6/21     • simvastatin (ZOCOR) 20 mg tablet Take 20 mg by mouth daily at bedtime      • zolpidem (AMBIEN) 10 mg tablet as needed      • atenolol-chlorthalidone (TENORETIC) 50-25 mg per tablet Take 1 tablet by mouth daily     • potassium chloride (K-DUR,KLOR-CON) 20 mEq tablet Take 20 mEq by mouth daily Take with food       No current facility-administered medications for this visit  She is allergic to pollen extract and sulfa antibiotics       Menstrual History:  OB History        2    Para   2    Term   2            AB        Living   2       SAB        IAB        Ectopic        Multiple        Live Births   2           Obstetric Comments   Menarche: 17  Menopause: 53              No LMP recorded  Patient is postmenopausal          Review of Systems   Constitutional: Negative for fatigue, fever and unexpected weight change  HENT: Negative for dental problem and sinus pressure  Eyes: Negative for visual disturbance  Respiratory: Negative for cough, shortness of breath and wheezing  Cardiovascular: Negative for chest pain  Gastrointestinal: Negative for abdominal pain, blood in stool, constipation, diarrhea, nausea and vomiting  Endocrine: Negative for polydipsia  Genitourinary: Negative for difficulty urinating, dyspareunia, dysuria, frequency, hematuria, pelvic pain and urgency  Musculoskeletal: Negative for arthralgias and back pain  Neurological: Negative for dizziness, seizures, light-headedness and headaches  Psychiatric/Behavioral: Negative for suicidal ideas  The patient is not nervous/anxious  Objective:  Vitals:    10/21/22 1105   BP: 105/50   BP Location: Left arm   Patient Position: Sitting   Cuff Size: Standard   Weight: 67 3 kg (148 lb 6 4 oz)   Height: 5' (1 524 m)      Physical Exam  Constitutional:       Appearance: Normal appearance  She is well-developed  Genitourinary:      Vulva and bladder normal       No lesions in the vagina  Right Labia: No rash, tenderness, lesions or skin changes  Left Labia: No tenderness, lesions, skin changes or rash  No labial fusion noted        No inguinal adenopathy present in the right or left side  No vaginal discharge, erythema, tenderness or bleeding  Right Adnexa: not tender, not full and no mass present  Left Adnexa: not tender, not full and no mass present  No cervical motion tenderness, discharge or lesion  Uterus is prolapsed (stage 2 uterine prolpase)  Uterus is not enlarged, tender or irregular  No uterine mass detected  No urethral prolapse, tenderness or mass present  Bladder is not tender  Breasts: Breasts are symmetrical       Right: No swelling, bleeding, inverted nipple, mass, nipple discharge, skin change, tenderness, axillary adenopathy or supraclavicular adenopathy  Left: No swelling, bleeding, inverted nipple, mass, nipple discharge, skin change, tenderness, axillary adenopathy or supraclavicular adenopathy  HENT:      Head: Normocephalic and atraumatic  Neck:      Thyroid: No thyromegaly  Cardiovascular:      Rate and Rhythm: Normal rate and regular rhythm  Heart sounds: Normal heart sounds  No murmur heard  No friction rub  No gallop  Pulmonary:      Effort: Pulmonary effort is normal  No respiratory distress  Breath sounds: Normal breath sounds  No wheezing  Abdominal:      General: There is no distension  Palpations: Abdomen is soft  There is no mass  Tenderness: There is no abdominal tenderness  There is no guarding or rebound  Hernia: No hernia is present  Lymphadenopathy:      Cervical: No cervical adenopathy  Upper Body:      Right upper body: No supraclavicular, axillary or pectoral adenopathy  Left upper body: No supraclavicular, axillary or pectoral adenopathy  Lower Body: No right inguinal adenopathy  No left inguinal adenopathy  Neurological:      Mental Status: She is alert and oriented to person, place, and time  Skin:     General: Skin is warm and dry     Psychiatric:         Behavior: Behavior normal

## 2022-10-21 NOTE — ASSESSMENT & PLAN NOTE
Pap guidelines reviewed  Pap smears no longer indicated in this low risk patient over the age of 74 with history of normal pap smears  Continue yearly mammograms, script given  Reviewed uterine prolapse seen on exam  Patient reports has discuss options of pessary, surgery, physical therapy in the past  Does not have any symptoms from it, will continue to monitor  Recommend Calcium 1200mg in two divided doses  Vitamin D3 2,000-4,000 IU daily   Weight bearing exercises 3-4x's a week for 30-40min  Return to office in 2 years for annual or as needed

## 2022-12-20 PROBLEM — Z01.419 GYNECOLOGIC EXAM NORMAL: Status: RESOLVED | Noted: 2022-10-21 | Resolved: 2022-12-20

## 2023-04-17 DIAGNOSIS — Z12.31 ENCOUNTER FOR SCREENING MAMMOGRAM FOR MALIGNANT NEOPLASM OF BREAST: ICD-10-CM

## 2024-03-08 ENCOUNTER — TELEPHONE (OUTPATIENT)
Age: 79
End: 2024-03-08

## 2024-05-20 ENCOUNTER — TELEPHONE (OUTPATIENT)
Age: 79
End: 2024-05-20

## 2024-05-20 DIAGNOSIS — Z12.31 ENCOUNTER FOR SCREENING MAMMOGRAM FOR MALIGNANT NEOPLASM OF BREAST: Primary | ICD-10-CM

## 2024-05-20 NOTE — TELEPHONE ENCOUNTER
Placed Mammogram order and will fax to [facility] central scheduling dept.. Sindi informed of faxed Mammogram order to [facility] Scheduling Dept..Received fax confirmation to [phone].

## 2024-05-20 NOTE — TELEPHONE ENCOUNTER
Pt called she needs an order for a mammogram she needs it to be faxed to [facility] and patient needs a call back after it is done so she can call [facility]ille and make appt

## 2024-08-07 ENCOUNTER — HOSPITAL ENCOUNTER (EMERGENCY)
Facility: HOSPITAL | Age: 79
Discharge: HOME | End: 2024-08-07
Attending: EMERGENCY MEDICINE
Payer: COMMERCIAL

## 2024-08-07 VITALS
DIASTOLIC BLOOD PRESSURE: 70 MMHG | SYSTOLIC BLOOD PRESSURE: 150 MMHG | HEART RATE: 60 BPM | OXYGEN SATURATION: 100 % | RESPIRATION RATE: 18 BRPM | TEMPERATURE: 98 F

## 2024-08-07 DIAGNOSIS — R09.A2 GLOBUS SENSATION: Primary | ICD-10-CM

## 2024-08-07 LAB
ALBUMIN SERPL BCG-MCNC: 4.5 G/DL (ref 3.5–5)
ALP SERPL-CCNC: 69 U/L (ref 34–104)
ALT SERPL W P-5'-P-CCNC: 10 U/L (ref 7–52)
ANION GAP SERPL CALCULATED.3IONS-SCNC: 10 MMOL/L (ref 4–13)
AST SERPL W P-5'-P-CCNC: 17 U/L (ref 13–39)
BASOPHILS # BLD AUTO: 0.04 THOUSANDS/ÂΜL (ref 0–0.1)
BASOPHILS NFR BLD AUTO: 1 % (ref 0–1)
BILIRUB SERPL-MCNC: 0.74 MG/DL (ref 0.2–1)
BUN SERPL-MCNC: 24 MG/DL (ref 5–25)
CALCIUM SERPL-MCNC: 9.9 MG/DL (ref 8.4–10.2)
CHLORIDE SERPL-SCNC: 103 MMOL/L (ref 96–108)
CO2 SERPL-SCNC: 26 MMOL/L (ref 21–32)
CREAT SERPL-MCNC: 1.32 MG/DL (ref 0.6–1.3)
EOSINOPHIL # BLD AUTO: 0.21 THOUSAND/ÂΜL (ref 0–0.61)
EOSINOPHIL NFR BLD AUTO: 3 % (ref 0–6)
ERYTHROCYTE [DISTWIDTH] IN BLOOD BY AUTOMATED COUNT: 12.4 % (ref 11.6–15.1)
GFR SERPL CREATININE-BSD FRML MDRD: 39 ML/MIN/1.73SQ M
GLUCOSE SERPL-MCNC: 97 MG/DL (ref 65–140)
HCT VFR BLD AUTO: 38.9 % (ref 34.8–46.1)
HGB BLD-MCNC: 12.8 G/DL (ref 11.5–15.4)
HOLD SPECIMEN: NORMAL
IMM GRANULOCYTES # BLD AUTO: 0.01 THOUSAND/UL (ref 0–0.2)
IMM GRANULOCYTES NFR BLD AUTO: 0 % (ref 0–2)
LYMPHOCYTES # BLD AUTO: 1.77 THOUSANDS/ÂΜL (ref 0.6–4.47)
LYMPHOCYTES NFR BLD AUTO: 23 % (ref 14–44)
MCH RBC QN AUTO: 29.6 PG (ref 26.8–34.3)
MCHC RBC AUTO-ENTMCNC: 32.9 G/DL (ref 31.4–37.4)
MCV RBC AUTO: 90 FL (ref 82–98)
MONOCYTES # BLD AUTO: 0.69 THOUSAND/ÂΜL (ref 0.17–1.22)
MONOCYTES NFR BLD AUTO: 9 % (ref 4–12)
NEUTROPHILS # BLD AUTO: 4.91 THOUSANDS/ÂΜL (ref 1.85–7.62)
NEUTS SEG NFR BLD AUTO: 64 % (ref 43–75)
NRBC BLD AUTO-RTO: 0 /100 WBCS
PLATELET # BLD AUTO: 259 THOUSANDS/UL (ref 149–390)
PMV BLD AUTO: 9.6 FL (ref 8.9–12.7)
POTASSIUM SERPL-SCNC: 4.2 MMOL/L (ref 3.5–5.3)
PROT SERPL-MCNC: 7.7 G/DL (ref 6.4–8.4)
RBC # BLD AUTO: 4.33 MILLION/UL (ref 3.81–5.12)
SODIUM SERPL-SCNC: 139 MMOL/L (ref 135–147)
WBC # BLD AUTO: 7.63 THOUSAND/UL (ref 4.31–10.16)

## 2024-08-07 PROCEDURE — 36415 COLL VENOUS BLD VENIPUNCTURE: CPT

## 2024-08-07 PROCEDURE — 96375 TX/PRO/DX INJ NEW DRUG ADDON: CPT

## 2024-08-07 PROCEDURE — 80053 COMPREHEN METABOLIC PANEL: CPT | Performed by: EMERGENCY MEDICINE

## 2024-08-07 PROCEDURE — 99284 EMERGENCY DEPT VISIT MOD MDM: CPT

## 2024-08-07 PROCEDURE — 85025 COMPLETE CBC W/AUTO DIFF WBC: CPT | Performed by: EMERGENCY MEDICINE

## 2024-08-07 PROCEDURE — 96374 THER/PROPH/DIAG INJ IV PUSH: CPT

## 2024-08-07 PROCEDURE — 99283 EMERGENCY DEPT VISIT LOW MDM: CPT

## 2024-08-07 RX ORDER — PANTOPRAZOLE SODIUM 40 MG/10ML
40 INJECTION, POWDER, LYOPHILIZED, FOR SOLUTION INTRAVENOUS ONCE
Status: COMPLETED | OUTPATIENT
Start: 2024-08-07 | End: 2024-08-07

## 2024-08-07 RX ORDER — METOCLOPRAMIDE HYDROCHLORIDE 5 MG/ML
10 INJECTION INTRAMUSCULAR; INTRAVENOUS ONCE
Status: COMPLETED | OUTPATIENT
Start: 2024-08-07 | End: 2024-08-07

## 2024-08-07 RX ADMIN — PANTOPRAZOLE SODIUM 40 MG: 40 INJECTION, POWDER, FOR SOLUTION INTRAVENOUS at 16:04

## 2024-08-07 RX ADMIN — METOCLOPRAMIDE 10 MG: 5 INJECTION, SOLUTION INTRAMUSCULAR; INTRAVENOUS at 14:50

## 2024-08-07 RX ADMIN — GLUCAGON 1 MG: KIT at 14:49

## 2024-08-07 NOTE — DISCHARGE INSTRUCTIONS
Take the prescribed omeprazole (Prilosec) once daily for the next 10 days.  Eat a clear liquid and soft food diet for the next 1 to 2 days.  Advance as tolerated.  Follow-up with GI in 2 to 4 weeks.  Notify your primary care provider of your visit today.    Return to the ER if you develop severe choking episode, inability to swallow, drooling, vomiting, weakness, confusion, lethargy.

## 2024-08-07 NOTE — ED PROVIDER NOTES
History  Chief Complaint   Patient presents with    Foreign Body in Throat     Patient states she was at the grocery store and had a piece of cheese and thinks its stuck in her throat as she can't swallow water and she can feel it      The patient is a 78-year-old female presenting for evaluation of feeling of esophageal foreign body.  The patient was at the grocery store and was handed a slice of sample cheese.  She chewed the slice and proceeded to swallow.  She notes since that time feeling a foreign body sensation in the lower throat.  She went home promptly due to this feeling and tried to drink water to pass it.  She has not had success and vomited the water and some chunks of cheese up.  She reports having an episode of difficulty with passing rice in the past but with continuing to drink fluids she was able to pass on her own.  She presents as she is unable to eat or drink.      History provided by:  Patient   used: No        Prior to Admission Medications   Prescriptions Last Dose Informant Patient Reported? Taking?   Probiotic Product (PROBIOTIC DAILY PO)   Yes No   Sig: Take by mouth daily 21 Is currently taking with  Cephalexin ATB therapy - will finish on 21   acetaminophen (TYLENOL) 500 mg tablet  Self Yes No   Sig: Take 500 mg by mouth every 6 (six) hours as needed for mild pain   amLODIPine (NORVASC) 5 mg tablet  Self Yes No   Sig: Take 5 mg by mouth daily    atenolol-chlorthalidone (TENORETIC) 50-25 mg per tablet   Yes No   Sig: Take 1 tablet by mouth daily   gabapentin (NEURONTIN) 300 mg capsule  Self Yes No   Si (two) times a day   losartan (COZAAR) 100 MG tablet  Self Yes No   Sig: Take 100 mg by mouth daily   potassium chloride (K-DUR,KLOR-CON) 20 mEq tablet   Yes No   Sig: Take 20 mEq by mouth daily Take with food   simvastatin (ZOCOR) 20 mg tablet  Self Yes No   Sig: Take 20 mg by mouth daily at bedtime    zolpidem (AMBIEN) 10 mg tablet  Self Yes No   Sig: as  "needed       Facility-Administered Medications: None       Past Medical History:   Diagnosis Date    Allergic rhinitis     Arthritis     Basal cell carcinoma (BCC)     Pt states with BCC - had it removed by derm.    Colostomy in place (HCC)     DDD (degenerative disc disease), lumbar     Diverticulitis large intestine w/o perforation or abscess w/o bleeding     GERD (gastroesophageal reflux disease)     Hemorrhoids     Hernia of abdominal cavity 03/18/2020    History of gallstones     Hyperlipidemia     Hypertension     Lyme disease 1988    Papanicolaou smear 2018    Pneumonia     Hx of pneumonia x1    Skin cancer     face, squamous       Past Surgical History:   Procedure Laterality Date    BAND HEMORRHOIDECTOMY  07/08/2020    CHOLECYSTECTOMY      COLONOSCOPY      COLOSTOMY Left 02/18/2021    Procedure: COLOSTOMY END creation;  Surgeon: aJcquelin Rose MD;  Location: [facility];  Service: General    DXA PROCEDURE (HISTORICAL)      FINGER SURGERY      RIGHT SECOND FINGER-\"TRIGGER FINGER\"    LAPAROTOMY N/A 02/18/2021    Procedure: LAPAROTOMY EXPLORATORY;  Surgeon: Jacquelin Rose MD;  Location: [facility];  Service: General    MAMMO (HISTORICAL)      PILONIDAL CYST EXCISION      AR CLOSURE ENTEROSTOMY LG/SMALL INTESTINE N/A 05/12/2021    Procedure: COLOSTOMY TAKEDOWN / REVERSE GOLDEN, laparocopic anastomosis of colon, Extensive lysis of adhesions, intraop colonoscopy, Bilateral Tap block, Segmental rectum rection;  Surgeon: Al Ochoa MD;  Location: [facility];  Service: General    AR COLECTOMY PARTIAL W/ANASTOMOSIS N/A 02/18/2021    Procedure: RESECTION COLON SIGMOID;  Surgeon: Jacquelin Rose MD;  Location: [facility];  Service: General    AR LAPAROSCOPY SURG RPR INITIAL INGUINAL HERNIA Right 07/14/2020    Procedure: REPAIR HERNIA INGUINAL and femoral hernia, LAPAROSCOPIC;  Surgeon: Al Ochoa MD;  Location: [facility];  Service: General    AR LAPS ABD PRTM&OMENTUM DX W/WO SPEC BR/WA SPX N/A " 2021    Procedure: LAPAROSCOPY DIAGNOSTIC;  Surgeon: Jacquelin Rose MD;  Location:  MAIN OR;  Service: General    SHOULDER SURGERY Right     VULVA BIOPSY         Family History   Problem Relation Age of Onset    Coronary artery disease Mother     Hypertension Mother     Osteoarthritis Mother     Hypotension Father     Emphysema Father     Prostate cancer Father      I have reviewed and agree with the history as documented.    E-Cigarette/Vaping    E-Cigarette Use Never User     Comments Denies       E-Cigarette/Vaping Substances    Nicotine No     THC No     CBD No     Flavoring No     Other No     Unknown No      Social History     Tobacco Use    Smoking status: Former     Current packs/day: 0.00     Average packs/day: 0.5 packs/day for 5.0 years (2.5 ttl pk-yrs)     Types: Cigarettes     Start date: 1965     Quit date: 1970     Years since quittin.6    Smokeless tobacco: Never   Vaping Use    Vaping status: Never Used   Substance Use Topics    Alcohol use: Not Currently     Comment: Denies any use currently     Drug use: Not Currently     Comment: Denies       Review of Systems   HENT:          Feeling of esophageal foreign body, dysphagia   Respiratory:  Negative for shortness of breath.         Denies difficulty breathing   All other systems reviewed and are negative.      Physical Exam  Physical Exam  Vitals and nursing note reviewed.   Constitutional:       General: She is not in acute distress.     Appearance: Normal appearance. She is normal weight. She is not ill-appearing, toxic-appearing or diaphoretic.   HENT:      Head: Normocephalic and atraumatic.      Nose: Nose normal.      Mouth/Throat:      Mouth: Mucous membranes are moist.      Pharynx: Oropharynx is clear.   Eyes:      Extraocular Movements: Extraocular movements intact.      Conjunctiva/sclera: Conjunctivae normal.   Cardiovascular:      Rate and Rhythm: Normal rate and regular rhythm.      Pulses:           Radial  pulses are 2+ on the right side and 2+ on the left side.      Heart sounds: Normal heart sounds, S1 normal and S2 normal.   Pulmonary:      Effort: Pulmonary effort is normal. No tachypnea or respiratory distress.      Breath sounds: Normal breath sounds and air entry. No stridor.   Musculoskeletal:         General: Normal range of motion.      Cervical back: Normal range of motion and neck supple.   Skin:     General: Skin is warm and dry.      Capillary Refill: Capillary refill takes less than 2 seconds.   Neurological:      General: No focal deficit present.      Mental Status: She is alert and oriented to person, place, and time. Mental status is at baseline.         Vital Signs  ED Triage Vitals [08/07/24 1407]   Temperature Pulse Respirations Blood Pressure SpO2   98 °F (36.7 °C) 56 18 167/73 100 %      Temp Source Heart Rate Source Patient Position - Orthostatic VS BP Location FiO2 (%)   Temporal Monitor Sitting Right arm --      Pain Score       --           Vitals:    08/07/24 1407 08/07/24 1600   BP: 167/73 150/70   Pulse: 56 60   Patient Position - Orthostatic VS: Sitting          Visual Acuity      ED Medications  Medications   metoclopramide (REGLAN) injection 10 mg (10 mg Intravenous Given 8/7/24 1450)   glucagon (GLUCAGEN) injection 1 mg (1 mg Intravenous Given 8/7/24 1449)   pantoprazole (PROTONIX) injection 40 mg (40 mg Intravenous Given 8/7/24 1604)       Diagnostic Studies  Results Reviewed       Procedure Component Value Units Date/Time    [place] draw [[id number]] Collected: 08/07/24 1409    Lab Status: Final result Specimen: Blood from Arm, Left Updated: 08/07/24 1601    Narrative:      The following orders were created for panel order [place] draw.  Procedure                               Abnormality         Status                     ---------                               -----------         ------                     Light Blue Top on hold[[id number]]                           Final result                Gold top on hold[[id number]]                                 Final result               Green / Black tube on hold[[id number]]                       Final result                 Please view results for these tests on the individual orders.    Comprehensive metabolic panel [[id number]]  (Abnormal) Collected: 08/07/24 1409    Lab Status: Final result Specimen: Blood from Arm, Left Updated: 08/07/24 1433     Sodium 139 mmol/L      Potassium 4.2 mmol/L      Chloride 103 mmol/L      CO2 26 mmol/L      ANION GAP 10 mmol/L      BUN 24 mg/dL      Creatinine 1.32 mg/dL      Glucose 97 mg/dL      Calcium 9.9 mg/dL      AST 17 U/L      ALT 10 U/L      Alkaline Phosphatase 69 U/L      Total Protein 7.7 g/dL      Albumin 4.5 g/dL      Total Bilirubin 0.74 mg/dL      eGFR 39 ml/min/1.73sq m     Narrative:      National Kidney Disease Foundation guidelines for Chronic Kidney Disease (CKD):     Stage 1 with normal or high GFR (GFR > 90 mL/min/1.73 square meters)    Stage 2 Mild CKD (GFR = 60-89 mL/min/1.73 square meters)    Stage 3A Moderate CKD (GFR = 45-59 mL/min/1.73 square meters)    Stage 3B Moderate CKD (GFR = 30-44 mL/min/1.73 square meters)    Stage 4 Severe CKD (GFR = 15-29 mL/min/1.73 square meters)    Stage 5 End Stage CKD (GFR <15 mL/min/1.73 square meters)  Note: GFR calculation is accurate only with a steady state creatinine    CBC and differential [[id number]] Collected: 08/07/24 1409    Lab Status: Final result Specimen: Blood from Arm, Left Updated: 08/07/24 1416     WBC 7.63 Thousand/uL      RBC 4.33 Million/uL      Hemoglobin 12.8 g/dL      Hematocrit 38.9 %      MCV 90 fL      MCH 29.6 pg      MCHC 32.9 g/dL      RDW 12.4 %      MPV 9.6 fL      Platelets 259 Thousands/uL      nRBC 0 /100 WBCs      Segmented % 64 %      Immature Grans % 0 %      Lymphocytes % 23 %      Monocytes % 9 %      Eosinophils Relative 3 %      Basophils Relative 1 %      Absolute Neutrophils 4.91 Thousands/µL      Absolute  Immature Grans 0.01 Thousand/uL      Absolute Lymphocytes 1.77 Thousands/µL      Absolute Monocytes 0.69 Thousand/µL      Eosinophils Absolute 0.21 Thousand/µL      Basophils Absolute 0.04 Thousands/µL                    No orders to display              Procedures  Procedures         ED Course  ED Course as of 08/07/24 1732   Wed Aug 07, 2024   1530 Patient able to tolerate 5 mL of fluid.  When drinking 15 to 20 mL, she promptly vomits it back up.  BRANDAN Mirlande from GI at bedside   1545 On reevaluation, patient able to tolerate small bits of marianela cracker.  Able to take small sips of fluids.  Will try dysphagia diet   1545 BRANDAN from GI Mirlande notes the patient is currently stable and they do not recommend endoscopy   1617 Patient breathing comfortably.  Tolerating soft foods and liquids.  Discharged home with follow-up with GI                                 SBIRT 20yo+      Flowsheet Row Most Recent Value   Initial Alcohol Screen: US AUDIT-C     1. How often do you have a drink containing alcohol? 0 Filed at: 08/07/2024 1407   2. How many drinks containing alcohol do you have on a typical day you are drinking?  0 Filed at: 08/07/2024 1407   3a. Male UNDER 65: How often do you have five or more drinks on one occasion? 0 Filed at: 08/07/2024 1407   3b. FEMALE Any Age, or MALE 65+: How often do you have 4 or more drinks on one occassion? 0 Filed at: 08/07/2024 1407   Audit-C Score 0 Filed at: 08/07/2024 1407   BROWN: How many times in the past year have you...    Used an illegal drug or used a prescription medication for non-medical reasons? Never Filed at: 08/07/2024 1407                      Medical Decision Making  DDx including but not limited to: esophageal foreign body, doubt infectious process or airway compromise    Will trial IV Reglan and glucagon.  Will try repeat p.o. challenge. See ED course for further MDM and disposition discussion.      Problems Addressed:  Globus sensation: acute illness or  injury    Amount and/or Complexity of Data Reviewed  Labs: ordered.    Risk  OTC drugs.  Prescription drug management.                 Disposition  Final diagnoses:   Globus sensation     Time reflects when diagnosis was documented in both MDM as applicable and the Disposition within this note       Time User Action Codes Description Comment    8/7/2024  4:21 PM Katie Murillo Add [R09.A2] Globus sensation           ED Disposition       ED Disposition   Discharge    Condition   Stable    Date/Time   Wed Aug 7, 2024 1621    Comment   Sakshi Prieto discharge to home/self care.                   Follow-up Information       Follow up With Specialties Details Why Contact Info Additional Information    Hortensia Ledezma,  Family Medicine Call in 1 day to notify of your visit to the  [facility] [address]  [phone]       [facility] Schedule an appointment as soon as possible for a visit in 1 month As needed, If symptoms worsen/return [address]  [address]  [facility] [address]  [phone] [facility] [place], [address], [facility]  [address]     [phone]     [facility] Emergency Department Emergency Medicine Go to  If symptoms worsen [address] [facility] [address]  [phone] [facility] Emergency Department, [address], [place] [address]            Discharge Medication List as of 8/7/2024  4:23 PM        START taking these medications    Details   omeprazole (PriLOSEC) 20 mg delayed release capsule Take 1 capsule (20 mg total) by mouth daily for 10 days, Starting Wed 8/7/2024, Until Sat 8/17/2024, Normal           CONTINUE these medications which have NOT CHANGED    Details   acetaminophen (TYLENOL) 500 mg tablet Take 500 mg by mouth every 6 (six) hours as  needed for mild pain, Historical Med      amLODIPine (NORVASC) 5 mg tablet Take 5 mg by mouth daily , Starting Mon 3/16/2020, Historical Med      atenolol-chlorthalidone (TENORETIC) 50-25 mg per tablet Take 1 tablet by mouth daily, Starting Thu 9/8/2022, Historical Med      gabapentin (NEURONTIN) 300 mg capsule 2 (two) times a day, Historical Med      losartan (COZAAR) 100 MG tablet Take 100 mg by mouth daily, Historical Med      potassium chloride (K-DUR,KLOR-CON) 20 mEq tablet Take 20 mEq by mouth daily Take with food, Starting Wed 8/24/2022, Historical Med      Probiotic Product (PROBIOTIC DAILY PO) Take by mouth daily 5/4/21 Is currently taking with  Cephalexin ATB therapy - will finish on 5/6/21, Historical Med      simvastatin (ZOCOR) 20 mg tablet Take 20 mg by mouth daily at bedtime , Starting Mon 3/16/2020, Historical Med      zolpidem (AMBIEN) 10 mg tablet as needed , Starting Mon 3/2/2020, Historical Med             No discharge procedures on file.    PDMP Review         Value Time User    PDMP Reviewed  Yes 5/14/2021  9:10 AM Jessie Mario PA-C            ED Provider  Electronically Signed by             JOCELYNE Hensley  08/07/24 7810

## 2024-08-28 ENCOUNTER — OFFICE VISIT (OUTPATIENT)
Dept: GASTROENTEROLOGY | Facility: CLINIC | Age: 79
End: 2024-08-28
Payer: COMMERCIAL

## 2024-08-28 ENCOUNTER — TELEPHONE (OUTPATIENT)
Dept: GASTROENTEROLOGY | Facility: CLINIC | Age: 79
End: 2024-08-28

## 2024-08-28 VITALS
WEIGHT: 144 LBS | HEIGHT: 60 IN | SYSTOLIC BLOOD PRESSURE: 130 MMHG | DIASTOLIC BLOOD PRESSURE: 80 MMHG | BODY MASS INDEX: 28.27 KG/M2

## 2024-08-28 DIAGNOSIS — R13.12 OROPHARYNGEAL DYSPHAGIA: Primary | ICD-10-CM

## 2024-08-28 PROCEDURE — 99203 OFFICE O/P NEW LOW 30 MIN: CPT | Performed by: NURSE PRACTITIONER

## 2024-08-28 NOTE — PROGRESS NOTES
[facility] - Outpatient Consultation  Sakshi Prieto 78 y.o. female MRN: [id number]  Encounter: [id number]    ASSESSMENT AND PLAN:    1.  Dysphagia  Recent ED visit 8/8 due to dysphagia after eating slice of cheese.  Described globus sensation and was unable to tolerate liquids with subsequent regurgitation  Received IV glucagon and Reglan in ED and was able to eat crackers which cleared sensation of food stuck in throat/upper chest  Reports previous episode of dysphagia after eating rice 2 to 3 months ago which eventually cleared after regurgitating  No further dysphagia since then but following dysphagia precautions  Denies GERD symptoms.  No history of esophageal stricture  Recommend proceeding with EGD to rule out stricture, erosive disease or inflammation  -Schedule for EGD at Endo center  -Continue dysphagia precautions including softer diet, small bites, chewing thoroughly, liquid wash  -No indication for PPI at this time    2.  History of perforated diverticulitis s/p resection  3.  Screening for colon cancer  S/p sigmoid resection with colostomy 2/2021  Prior colonoscopy 2021 prior to reversal of colostomy, no polyps excised.  10-year recall recommended in May 2031 if clinically indicated    Followup Appointment: 3 months  ______________________________________________________________________    Chief Complaint   Patient presents with   • Follow-up     Pt was in ER two weeks ago, piece of cheese caught in her throat, pt got it up but could not swallow, this has happened before as well with rice       HPI:   Sakshi Prieto is a 78 y.o. year old female who presents with dysphagia.  She reports episode of difficulty swallowing after eating rice 2 to 3 months ago-developed sensation of choking and food stuck in upper throat.  Unable to tolerate liquids and eventually regurgitated food bolus with resolution of symptoms  Most recently she was seen in the ED at [facility]  "[employer] [place] 8/8 with globus sensation after eating a piece of cheese in the supermarket.  Again was unable to tolerate liquids and regurgitated  She received glucagon and IV Reglan in the ED.  Was able to eventually eat crackers which cleared globus sensation    No further dysphagia since then but she has been careful with her diet-chewing food thoroughly, taking smaller bites  Denies GERD symptoms.  Her appetite has been good and her weight has been stable    Denies acute change in bowel habits.  Occasional diarrhea-uses Imodium as needed  No melena or rectal bleeding  Prior colonoscopy 2021 was normal-10-year recall recommended  History of perforated diverticulitis    Historical Information   Past Medical History:   Diagnosis Date   • Allergic rhinitis    • Arthritis    • Basal cell carcinoma (BCC)     Pt states with BCC - had it removed by derm.   • Colostomy in place (HCC)    • DDD (degenerative disc disease), lumbar    • Diverticulitis large intestine w/o perforation or abscess w/o bleeding    • GERD (gastroesophageal reflux disease)    • Hemorrhoids    • Hernia of abdominal cavity 03/18/2020   • History of gallstones    • Hyperlipidemia    • Hypertension    • Lyme disease 1988   • Papanicolaou smear 2018   • Pneumonia     Hx of pneumonia x1   • Skin cancer     face, squamous     Past Surgical History:   Procedure Laterality Date   • BAND HEMORRHOIDECTOMY  07/08/2020   • CHOLECYSTECTOMY     • COLONOSCOPY     • COLOSTOMY Left 02/18/2021    Procedure: COLOSTOMY END creation;  Surgeon: Jacquelin Rose MD;  Location:  MAIN OR;  Service: General   • DXA PROCEDURE (HISTORICAL)     • FINGER SURGERY      RIGHT SECOND FINGER-\"TRIGGER FINGER\"   • HERNIA REPAIR     • LAPAROTOMY N/A 02/18/2021    Procedure: LAPAROTOMY EXPLORATORY;  Surgeon: Jacquelin Rose MD;  Location:  MAIN OR;  Service: General   • MAMMO (HISTORICAL)     • PILONIDAL CYST EXCISION     • MN CLOSURE ENTEROSTOMY LG/SMALL INTESTINE N/A " 2021    Procedure: COLOSTOMY TAKEDOWN / REVERSE GOLDEN, laparocopic anastomosis of colon, Extensive lysis of adhesions, intraop colonoscopy, Bilateral Tap block, Segmental rectum rection;  Surgeon: Al Ochoa MD;  Location: [facility];  Service: General   • CO COLECTOMY PARTIAL W/ANASTOMOSIS N/A 2021    Procedure: RESECTION COLON SIGMOID;  Surgeon: Jacquelin Rose MD;  Location: [facility];  Service: General   • CO LAPAROSCOPY SURG RPR INITIAL INGUINAL HERNIA Right 2020    Procedure: REPAIR HERNIA INGUINAL and femoral hernia, LAPAROSCOPIC;  Surgeon: Al Ochoa MD;  Location: [facility];  Service: General   • CO LAPS ABD PRTM&OMENTUM DX W/WO SPEC BR/WA SPX N/A 2021    Procedure: LAPAROSCOPY DIAGNOSTIC;  Surgeon: Jacquelin Rose MD;  Location: [facility];  Service: General   • SHOULDER SURGERY Right    • VULVA BIOPSY       Social History     Substance and Sexual Activity   Alcohol Use Not Currently    Comment: Denies any use currently      Social History     Substance and Sexual Activity   Drug Use Not Currently    Comment: Denies     Social History     Tobacco Use   Smoking Status Former   • Current packs/day: 0.00   • Average packs/day: 0.5 packs/day for 5.0 years (2.5 ttl pk-yrs)   • Types: Cigarettes   • Start date: 1965   • Quit date: 1970   • Years since quittin.6   Smokeless Tobacco Never     Family History   Problem Relation Age of Onset   • Coronary artery disease Mother    • Hypertension Mother    • Osteoarthritis Mother    • Hypotension Father    • Emphysema Father    • Prostate cancer Father        Meds/Allergies     Current Outpatient Medications:   •  acetaminophen (TYLENOL) 500 mg tablet  •  amLODIPine (NORVASC) 5 mg tablet  •  atenolol-chlorthalidone (TENORETIC) 50-25 mg per tablet  •  losartan (COZAAR) 100 MG tablet  •  potassium chloride (K-DUR,KLOR-CON) 20 mEq tablet  •  simvastatin (ZOCOR) 20 mg tablet  •  zolpidem (AMBIEN) 10 mg tablet  •   gabapentin (NEURONTIN) 300 mg capsule  •  omeprazole (PriLOSEC) 20 mg delayed release capsule  •  Probiotic Product (PROBIOTIC DAILY PO)    Allergies   Allergen Reactions   • Pollen Extract Itching     Sneezing    • Sulfa Antibiotics Rash     Cheeks and ears turned red  Other reaction(s): Shakiness       PHYSICAL EXAM:    Blood pressure 130/80, height 5' (1.524 m), weight 65.3 kg (144 lb), not currently breastfeeding. Body mass index is 28.12 kg/m².  General Appearance: NAD, cooperative, alert  Eyes: Anicteric  ENT:  Normocephalic, atraumatic, normal mucosa.    Neck:  Supple, symmetrical, trachea midline,   Resp:  Clear to auscultation bilaterally; no rales, rhonchi or wheezing; respirations unlabored   CV:  S1 S2, Regular rate and rhythm; no murmur, rub, or gallop.  GI:  Soft, non-tender, non-distended; normal bowel sounds; no masses, no organomegaly   Rectal: Deferred  Musculoskeletal: No cyanosis, clubbing or edema. Normal ROM.  Skin:  No jaundice, rashes, or lesions   Psych: Normal affect, good eye contact  Neuro: No gross deficits, AAOx3    Lab Results:   Lab Results   Component Value Date    WBC 7.63 08/07/2024    HGB 12.8 08/07/2024    HCT 38.9 08/07/2024    MCV 90 08/07/2024     08/07/2024     Lab Results   Component Value Date    K 4.2 08/07/2024     08/07/2024    CO2 26 08/07/2024    BUN 24 08/07/2024    CREATININE 1.32 (H) 08/07/2024    GLUF 97 06/16/2020    CALCIUM 9.9 08/07/2024    CORRECTEDCA 8.6 05/13/2021    AST 17 08/07/2024    ALT 10 08/07/2024    ALKPHOS 69 08/07/2024    EGFR 39 08/07/2024       Lab Results   Component Value Date    LIPASE 125 07/09/2021       REVIEW OF SYSTEMS:    CONSTITUTIONAL: Denies any fever, chills, rigors, and weight loss.  HEENT: No earache or tinnitus. Denies hearing loss or visual disturbances.  CARDIOVASCULAR: No chest pain or palpitations.   RESPIRATORY: Denies any cough, hemoptysis, shortness of breath or dyspnea on exertion.  GASTROINTESTINAL: As noted  in the History of Present Illness.   GENITOURINARY: No problems with urination. Denies any hematuria or dysuria.  NEUROLOGIC: No dizziness or vertigo, denies headaches.   MUSCULOSKELETAL: Denies any muscle or joint pain.   SKIN: Denies skin rashes or itching.   ENDOCRINE: Denies excessive thirst. Denies intolerance to heat or cold.  PSYCHOSOCIAL: Denies depression or anxiety. Denies any recent memory loss.

## 2024-08-28 NOTE — TELEPHONE ENCOUNTER
Scheduled date of EGD(as of today): 9/17/24  Physician performing EGD: GIOVANNI  Location of EGD: [facility]  Instructions reviewed with patient by: LAURA  Clearances: N

## 2024-08-28 NOTE — H&P (VIEW-ONLY)
Former[facility] - Outpatient Consultation  Sakshi Prieto 78 y.o. female MRN: [id number]  Encounter: [id number]    ASSESSMENT AND PLAN:    1.  Dysphagia  Recent ED visit 8/8 due to dysphagia after eating slice of cheese.  Described globus sensation and was unable to tolerate liquids with subsequent regurgitation  Received IV glucagon and Reglan in ED and was able to eat crackers which cleared sensation of food stuck in throat/upper chest  Reports previous episode of dysphagia after eating rice 2 to 3 months ago which eventually cleared after regurgitating  No further dysphagia since then but following dysphagia precautions  Denies GERD symptoms.  No history of esophageal stricture  Recommend proceeding with EGD to rule out stricture, erosive disease or inflammation  -Schedule for EGD at Endo center  -Continue dysphagia precautions including softer diet, small bites, chewing thoroughly, liquid wash  -No indication for PPI at this time    2.  History of perforated diverticulitis s/p resection  3.  Screening for colon cancer  S/p sigmoid resection with colostomy 2/2021  Prior colonoscopy 2021 prior to reversal of colostomy, no polyps excised.  10-year recall recommended in May 2031 if clinically indicated    Followup Appointment: 3 months  ______________________________________________________________________    Chief Complaint   Patient presents with   • Follow-up     Pt was in ER two weeks ago, piece of cheese caught in her throat, pt got it up but could not swallow, this has happened before as well with rice       HPI:   Sakshi Prieto is a 78 y.o. year old female who presents with dysphagia.  She reports episode of difficulty swallowing after eating rice 2 to 3 months ago-developed sensation of choking and food stuck in upper throat.  Unable to tolerate liquids and eventually regurgitated food bolus with resolution of symptoms  Most recently she was seen in the ED at [facility]  "[employer] [place] 8/8 with globus sensation after eating a piece of cheese in the supermarket.  Again was unable to tolerate liquids and regurgitated  She received glucagon and IV Reglan in the ED.  Was able to eventually eat crackers which cleared globus sensation    No further dysphagia since then but she has been careful with her diet-chewing food thoroughly, taking smaller bites  Denies GERD symptoms.  Her appetite has been good and her weight has been stable    Denies acute change in bowel habits.  Occasional diarrhea-uses Imodium as needed  No melena or rectal bleeding  Prior colonoscopy 2021 was normal-10-year recall recommended  History of perforated diverticulitis    Historical Information   Past Medical History:   Diagnosis Date   • Allergic rhinitis    • Arthritis    • Basal cell carcinoma (BCC)     Pt states with BCC - had it removed by derm.   • Colostomy in place (HCC)    • DDD (degenerative disc disease), lumbar    • Diverticulitis large intestine w/o perforation or abscess w/o bleeding    • GERD (gastroesophageal reflux disease)    • Hemorrhoids    • Hernia of abdominal cavity 03/18/2020   • History of gallstones    • Hyperlipidemia    • Hypertension    • Lyme disease 1988   • Papanicolaou smear 2018   • Pneumonia     Hx of pneumonia x1   • Skin cancer     face, squamous     Past Surgical History:   Procedure Laterality Date   • BAND HEMORRHOIDECTOMY  07/08/2020   • CHOLECYSTECTOMY     • COLONOSCOPY     • COLOSTOMY Left 02/18/2021    Procedure: COLOSTOMY END creation;  Surgeon: Jacquelin Rose MD;  Location:  MAIN OR;  Service: General   • DXA PROCEDURE (HISTORICAL)     • FINGER SURGERY      RIGHT SECOND FINGER-\"TRIGGER FINGER\"   • HERNIA REPAIR     • LAPAROTOMY N/A 02/18/2021    Procedure: LAPAROTOMY EXPLORATORY;  Surgeon: Jacquelin Rose MD;  Location:  MAIN OR;  Service: General   • MAMMO (HISTORICAL)     • PILONIDAL CYST EXCISION     • MI CLOSURE ENTEROSTOMY LG/SMALL INTESTINE N/A " 2021    Procedure: COLOSTOMY TAKEDOWN / REVERSE GOLDEN, laparocopic anastomosis of colon, Extensive lysis of adhesions, intraop colonoscopy, Bilateral Tap block, Segmental rectum rection;  Surgeon: Al Ochoa MD;  Location: [facility];  Service: General   • HI COLECTOMY PARTIAL W/ANASTOMOSIS N/A 2021    Procedure: RESECTION COLON SIGMOID;  Surgeon: Jacquelin Rose MD;  Location: [facility];  Service: General   • HI LAPAROSCOPY SURG RPR INITIAL INGUINAL HERNIA Right 2020    Procedure: REPAIR HERNIA INGUINAL and femoral hernia, LAPAROSCOPIC;  Surgeon: Al Ochoa MD;  Location: [facility];  Service: General   • HI LAPS ABD PRTM&OMENTUM DX W/WO SPEC BR/WA SPX N/A 2021    Procedure: LAPAROSCOPY DIAGNOSTIC;  Surgeon: Jacquelin Rose MD;  Location: [facility];  Service: General   • SHOULDER SURGERY Right    • VULVA BIOPSY       Social History     Substance and Sexual Activity   Alcohol Use Not Currently    Comment: Denies any use currently      Social History     Substance and Sexual Activity   Drug Use Not Currently    Comment: Denies     Social History     Tobacco Use   Smoking Status Former   • Current packs/day: 0.00   • Average packs/day: 0.5 packs/day for 5.0 years (2.5 ttl pk-yrs)   • Types: Cigarettes   • Start date: 1965   • Quit date: 1970   • Years since quittin.6   Smokeless Tobacco Never     Family History   Problem Relation Age of Onset   • Coronary artery disease Mother    • Hypertension Mother    • Osteoarthritis Mother    • Hypotension Father    • Emphysema Father    • Prostate cancer Father        Meds/Allergies     Current Outpatient Medications:   •  acetaminophen (TYLENOL) 500 mg tablet  •  amLODIPine (NORVASC) 5 mg tablet  •  atenolol-chlorthalidone (TENORETIC) 50-25 mg per tablet  •  losartan (COZAAR) 100 MG tablet  •  potassium chloride (K-DUR,KLOR-CON) 20 mEq tablet  •  simvastatin (ZOCOR) 20 mg tablet  •  zolpidem (AMBIEN) 10 mg tablet  •   gabapentin (NEURONTIN) 300 mg capsule  •  omeprazole (PriLOSEC) 20 mg delayed release capsule  •  Probiotic Product (PROBIOTIC DAILY PO)    Allergies   Allergen Reactions   • Pollen Extract Itching     Sneezing    • Sulfa Antibiotics Rash     Cheeks and ears turned red  Other reaction(s): Shakiness       PHYSICAL EXAM:    Blood pressure 130/80, height 5' (1.524 m), weight 65.3 kg (144 lb), not currently breastfeeding. Body mass index is 28.12 kg/m².  General Appearance: NAD, cooperative, alert  Eyes: Anicteric  ENT:  Normocephalic, atraumatic, normal mucosa.    Neck:  Supple, symmetrical, trachea midline,   Resp:  Clear to auscultation bilaterally; no rales, rhonchi or wheezing; respirations unlabored   CV:  S1 S2, Regular rate and rhythm; no murmur, rub, or gallop.  GI:  Soft, non-tender, non-distended; normal bowel sounds; no masses, no organomegaly   Rectal: Deferred  Musculoskeletal: No cyanosis, clubbing or edema. Normal ROM.  Skin:  No jaundice, rashes, or lesions   Psych: Normal affect, good eye contact  Neuro: No gross deficits, AAOx3    Lab Results:   Lab Results   Component Value Date    WBC 7.63 08/07/2024    HGB 12.8 08/07/2024    HCT 38.9 08/07/2024    MCV 90 08/07/2024     08/07/2024     Lab Results   Component Value Date    K 4.2 08/07/2024     08/07/2024    CO2 26 08/07/2024    BUN 24 08/07/2024    CREATININE 1.32 (H) 08/07/2024    GLUF 97 06/16/2020    CALCIUM 9.9 08/07/2024    CORRECTEDCA 8.6 05/13/2021    AST 17 08/07/2024    ALT 10 08/07/2024    ALKPHOS 69 08/07/2024    EGFR 39 08/07/2024       Lab Results   Component Value Date    LIPASE 125 07/09/2021       REVIEW OF SYSTEMS:    CONSTITUTIONAL: Denies any fever, chills, rigors, and weight loss.  HEENT: No earache or tinnitus. Denies hearing loss or visual disturbances.  CARDIOVASCULAR: No chest pain or palpitations.   RESPIRATORY: Denies any cough, hemoptysis, shortness of breath or dyspnea on exertion.  GASTROINTESTINAL: As noted  in the History of Present Illness.   GENITOURINARY: No problems with urination. Denies any hematuria or dysuria.  NEUROLOGIC: No dizziness or vertigo, denies headaches.   MUSCULOSKELETAL: Denies any muscle or joint pain.   SKIN: Denies skin rashes or itching.   ENDOCRINE: Denies excessive thirst. Denies intolerance to heat or cold.  PSYCHOSOCIAL: Denies depression or anxiety. Denies any recent memory loss.

## 2024-09-03 ENCOUNTER — ANESTHESIA (OUTPATIENT)
Dept: ANESTHESIOLOGY | Facility: AMBULATORY SURGERY CENTER | Age: 79
End: 2024-09-03

## 2024-09-03 ENCOUNTER — ANESTHESIA EVENT (OUTPATIENT)
Dept: ANESTHESIOLOGY | Facility: AMBULATORY SURGERY CENTER | Age: 79
End: 2024-09-03

## 2024-09-05 ENCOUNTER — TELEPHONE (OUTPATIENT)
Dept: GASTROENTEROLOGY | Facility: AMBULATORY SURGERY CENTER | Age: 79
End: 2024-09-05

## 2024-09-09 ENCOUNTER — ANESTHESIA EVENT (OUTPATIENT)
Dept: GASTROENTEROLOGY | Facility: AMBULATORY SURGERY CENTER | Age: 79
End: 2024-09-09

## 2024-09-09 ENCOUNTER — ANESTHESIA (OUTPATIENT)
Dept: GASTROENTEROLOGY | Facility: AMBULATORY SURGERY CENTER | Age: 79
End: 2024-09-09

## 2024-09-09 ENCOUNTER — HOSPITAL ENCOUNTER (OUTPATIENT)
Dept: GASTROENTEROLOGY | Facility: AMBULATORY SURGERY CENTER | Age: 79
Discharge: HOME | End: 2024-09-09
Payer: COMMERCIAL

## 2024-09-09 VITALS
DIASTOLIC BLOOD PRESSURE: 58 MMHG | WEIGHT: 144 LBS | HEART RATE: 49 BPM | SYSTOLIC BLOOD PRESSURE: 122 MMHG | BODY MASS INDEX: 28.27 KG/M2 | OXYGEN SATURATION: 100 % | RESPIRATION RATE: 17 BRPM | TEMPERATURE: 97.1 F | HEIGHT: 60 IN

## 2024-09-09 DIAGNOSIS — R13.12 OROPHARYNGEAL DYSPHAGIA: ICD-10-CM

## 2024-09-09 DIAGNOSIS — K21.9 GASTROESOPHAGEAL REFLUX DISEASE WITHOUT ESOPHAGITIS: Primary | ICD-10-CM

## 2024-09-09 PROCEDURE — 43239 EGD BIOPSY SINGLE/MULTIPLE: CPT | Performed by: INTERNAL MEDICINE

## 2024-09-09 PROCEDURE — 43450 DILATE ESOPHAGUS 1/MULT PASS: CPT | Performed by: INTERNAL MEDICINE

## 2024-09-09 PROCEDURE — 88305 TISSUE EXAM BY PATHOLOGIST: CPT | Performed by: PATHOLOGY

## 2024-09-09 PROCEDURE — 88342 IMHCHEM/IMCYTCHM 1ST ANTB: CPT | Performed by: PATHOLOGY

## 2024-09-09 RX ORDER — PROPOFOL 10 MG/ML
INJECTION, EMULSION INTRAVENOUS AS NEEDED
Status: DISCONTINUED | OUTPATIENT
Start: 2024-09-09 | End: 2024-09-09

## 2024-09-09 RX ORDER — LIDOCAINE HYDROCHLORIDE 10 MG/ML
INJECTION, SOLUTION EPIDURAL; INFILTRATION; INTRACAUDAL; PERINEURAL AS NEEDED
Status: DISCONTINUED | OUTPATIENT
Start: 2024-09-09 | End: 2024-09-09

## 2024-09-09 RX ORDER — PROPOFOL 10 MG/ML
INJECTION, EMULSION INTRAVENOUS CONTINUOUS PRN
Status: DISCONTINUED | OUTPATIENT
Start: 2024-09-09 | End: 2024-09-09

## 2024-09-09 RX ORDER — SODIUM CHLORIDE, SODIUM LACTATE, POTASSIUM CHLORIDE, CALCIUM CHLORIDE 600; 310; 30; 20 MG/100ML; MG/100ML; MG/100ML; MG/100ML
50 INJECTION, SOLUTION INTRAVENOUS CONTINUOUS
Status: DISCONTINUED | OUTPATIENT
Start: 2024-09-09 | End: 2024-09-09

## 2024-09-09 RX ADMIN — PROPOFOL 100 MG: 10 INJECTION, EMULSION INTRAVENOUS at 10:13

## 2024-09-09 RX ADMIN — SODIUM CHLORIDE, SODIUM LACTATE, POTASSIUM CHLORIDE, CALCIUM CHLORIDE 50 ML/HR: 600; 310; 30; 20 INJECTION, SOLUTION INTRAVENOUS at 09:51

## 2024-09-09 RX ADMIN — PROPOFOL 120 MCG/KG/MIN: 10 INJECTION, EMULSION INTRAVENOUS at 10:13

## 2024-09-09 RX ADMIN — LIDOCAINE HYDROCHLORIDE 100 MG: 10 INJECTION, SOLUTION EPIDURAL; INFILTRATION; INTRACAUDAL; PERINEURAL at 10:13

## 2024-09-09 NOTE — ANESTHESIA POSTPROCEDURE EVALUATION
Post-Op Assessment Note    CV Status:  Stable  Pain Score: 0    Pain management: adequate       Mental Status:  Alert and awake   Hydration Status:  Euvolemic   PONV Controlled:  Controlled   Airway Patency:  Patent     Post Op Vitals Reviewed: Yes    No anethesia notable event occurred.    Staff: CRNA               BP   110/55   Temp      Pulse   52   Resp   16   SpO2   98%

## 2024-09-09 NOTE — ANESTHESIA PREPROCEDURE EVALUATION
Procedure:  EGD    Relevant Problems   CARDIO   (+) Hyperlipidemia   (+) Hypertension      GI/HEPATIC   (+) Gastroesophageal reflux disease      MUSCULOSKELETAL   (+) DDD (degenerative disc disease), lumbar      Colostomy reversal  Physical Exam    Airway    Mallampati score: II  TM Distance: >3 FB  Neck ROM: full     Dental   Comment: None loose, No notable dental hx     Cardiovascular      Pulmonary      Other Findings  post-pubertal.      Anesthesia Plan  ASA Score- 2     Anesthesia Type- IV sedation with anesthesia with ASA Monitors.         Additional Monitors:     Airway Plan:     Comment: 04:30 - sip of water with meds. Bowel prep finished before then    Patient educated on the possibility for awareness under sedation and of the possibility of airway intervention in the event of an airway or procedural emergency  .       Plan Factors-Exercise tolerance (METS): >4 METS.    Chart reviewed.    Patient summary reviewed.    Patient is not a current smoker.              Induction- intravenous.    Postoperative Plan-         Informed Consent- Anesthetic plan and risks discussed with patient.  I personally reviewed this patient with the CRNA. Discussed and agreed on the Anesthesia Plan with the CRNA..

## 2024-09-09 NOTE — INTERVAL H&P NOTE
H&P reviewed. After examining the patient I find no changes in the patients condition since the H&P had been written.    Vitals:    09/09/24 0948   BP: 157/61   Pulse: (!) 45   Resp: 19   Temp: (!) 97.1 °F (36.2 °C)   SpO2: 100%

## 2024-09-16 ENCOUNTER — NURSE TRIAGE (OUTPATIENT)
Age: 79
End: 2024-09-16

## 2024-09-16 ENCOUNTER — TELEPHONE (OUTPATIENT)
Age: 79
End: 2024-09-16

## 2024-09-16 PROCEDURE — 88305 TISSUE EXAM BY PATHOLOGIST: CPT | Performed by: PATHOLOGY

## 2024-09-16 PROCEDURE — 88342 IMHCHEM/IMCYTCHM 1ST ANTB: CPT | Performed by: PATHOLOGY

## 2024-09-16 NOTE — TELEPHONE ENCOUNTER
Pt called to go over results of biopsy. Called triage nurse and per Cleopatra she will send a message to the provider to put a note in for better direction/interpretation and relayed the info to the patient. No further assistance required.

## 2024-09-16 NOTE — RESULT ENCOUNTER NOTE
I called the patient and left a voice message  -Esophageal biopsies and stomach biopsies no evidence of any malignancy or significant pathology.  Does have some reactive gastritis and distal esophagitis.  No recall EGD.  Patient does have focal intestinal metaplasia.  Discussed at time of visit.  Probably low risk for gastric cancer

## 2024-09-16 NOTE — TELEPHONE ENCOUNTER
"PT CALLING FOR EGD BIOPSY RESULTS.       Reason for Disposition   Information only question and nurse able to answer    Answer Assessment - Initial Assessment Questions  1. REASON FOR CALL or QUESTION: \"What is your reason for calling today?\" or \"How can I best help you?\" or \"What question do you have that I can help answer?\"      PT CALLING FOR EGD BIOPSY RESULTS.    Protocols used: Information Only Call - No Triage-ADULT-[place]    "

## 2024-10-24 ENCOUNTER — ANNUAL EXAM (OUTPATIENT)
Dept: OBGYN CLINIC | Facility: CLINIC | Age: 79
End: 2024-10-24
Payer: COMMERCIAL

## 2024-10-24 VITALS
HEIGHT: 60 IN | DIASTOLIC BLOOD PRESSURE: 52 MMHG | BODY MASS INDEX: 28.86 KG/M2 | WEIGHT: 147 LBS | SYSTOLIC BLOOD PRESSURE: 138 MMHG

## 2024-10-24 DIAGNOSIS — Z01.419 GYNECOLOGIC EXAM NORMAL: Primary | ICD-10-CM

## 2024-10-24 DIAGNOSIS — Z13.820 SCREENING FOR OSTEOPOROSIS: ICD-10-CM

## 2024-10-24 DIAGNOSIS — Z78.0 MENOPAUSE: ICD-10-CM

## 2024-10-24 DIAGNOSIS — Z12.31 ENCOUNTER FOR SCREENING MAMMOGRAM FOR BREAST CANCER: ICD-10-CM

## 2024-10-24 PROCEDURE — G0101 CA SCREEN;PELVIC/BREAST EXAM: HCPCS | Performed by: PHYSICIAN ASSISTANT

## 2024-10-24 NOTE — PROGRESS NOTES
[facility] - [address], [address], [place] [address]    ASSESSMENT/PLAN: Sakshi Prieto is a 79 y.o.  who presents for annual gynecologic exam.    Encounter for routine gynecologic examination  - Routine well woman exam completed today.  - Cervical Cancer Screening complete none on file. History of abnormal: no  - STI screening offered including HIV testing: offered, pt declined  - Breast Cancer Screening: Last Mammogram 2024 BIRADS 2: Benign.   - Colorectal cancer screening was not ordered. Last colonoscopy .   - Osteoporosis screening last DEXA 2019 Normal.       Additional problems addressed during this visit:  1. Gynecologic exam normal  Assessment & Plan:  Pap guidelines reviewed. Pap no longer indicated in this low risk patient over the age of 65 with no history of abnormal pap smears.   Continue yearly mammograms.   Colon cancer screening up to date.   Recommend Calcium 1200mg in two divided doses.   Vitamin D3 2,000-4,000 IU daily   Weight bearing exercises 3-4x's a week for 30-40min.   Script for DEXA scan given.   Return to office in 2 years for annual exam.   2. Encounter for screening mammogram for breast cancer  -     Mammo screening bilateral w 3d and cad; Future  3. Screening for osteoporosis  -     DXA bone density spine hip and pelvis; Future; Expected date: 10/24/2024  4. Menopause  -     DXA bone density spine hip and pelvis; Future; Expected date: 10/24/2024      CC:  Annual Gynecologic Examination    HPI: Sakshi Prieto is a 79 y.o.  who presents for annual gynecologic examination. Menopausal. No  Bleeding.   Denies bowel or bladder issues.     ROS: Negative except as noted in HPI    No LMP recorded. Patient is postmenopausal.       She  reports that she is not currently sexually active. She reports using the following method of birth control/protection: Post-menopausal.       The following portions of the patient's history were  "reviewed and updated as appropriate:  Past Medical History:   Diagnosis Date    Allergic rhinitis     Arthritis     Basal cell carcinoma (BCC)     Pt states with BCC - had it removed by derm.    Colostomy in place (HCC)     DDD (degenerative disc disease), lumbar     Diverticulitis large intestine w/o perforation or abscess w/o bleeding     GERD (gastroesophageal reflux disease)     Hemorrhoids     Hernia of abdominal cavity 03/18/2020    History of gallstones     Hyperlipidemia     Hypertension     Lyme disease 1988    Papanicolaou smear 2018    Pneumonia     Hx of pneumonia x1    Skin cancer     face, squamous     Past Surgical History:   Procedure Laterality Date    BAND HEMORRHOIDECTOMY  07/08/2020    CHOLECYSTECTOMY      COLON SURGERY      COLONOSCOPY      COLOSTOMY Left 02/18/2021    Procedure: COLOSTOMY END creation;  Surgeon: Jacquelin Rose MD;  Location: [facility];  Service: General    DXA PROCEDURE (HISTORICAL)      EGD      FINGER SURGERY      RIGHT SECOND FINGER-\"TRIGGER FINGER\"    HERNIA REPAIR      LAPAROTOMY N/A 02/18/2021    Procedure: LAPAROTOMY EXPLORATORY;  Surgeon: Jacquelin Rose MD;  Location: [facility];  Service: General    MAMMO (HISTORICAL)      PILONIDAL CYST EXCISION      SC CLOSURE ENTEROSTOMY LG/SMALL INTESTINE N/A 05/12/2021    Procedure: COLOSTOMY TAKEDOWN / REVERSE GOLDEN, laparocopic anastomosis of colon, Extensive lysis of adhesions, intraop colonoscopy, Bilateral Tap block, Segmental rectum rection;  Surgeon: Al Ochoa MD;  Location: [facility];  Service: General    SC COLECTOMY PARTIAL W/ANASTOMOSIS N/A 02/18/2021    Procedure: RESECTION COLON SIGMOID;  Surgeon: Jacquelin Rose MD;  Location: [facility];  Service: General    SC LAPAROSCOPY SURG RPR INITIAL INGUINAL HERNIA Right 07/14/2020    Procedure: REPAIR HERNIA INGUINAL and femoral hernia, LAPAROSCOPIC;  Surgeon: Al Ochoa MD;  Location: [facility];  Service: General    SC LAPS ABD PRTM&OMENTUM DX " W/WO SPEC BR/WA SPX N/A 2021    Procedure: LAPAROSCOPY DIAGNOSTIC;  Surgeon: Jacquelin Rose MD;  Location:  MAIN OR;  Service: General    SHOULDER SURGERY Right     VULVA BIOPSY       Family History   Problem Relation Age of Onset    Coronary artery disease Mother     Hypertension Mother     Osteoarthritis Mother     Hypotension Father     Emphysema Father     Prostate cancer Father      Social History     Socioeconomic History    Marital status:      Spouse name: None    Number of children: None    Years of education: None    Highest education level: None   Occupational History    None   Tobacco Use    Smoking status: Former     Current packs/day: 0.00     Average packs/day: 0.5 packs/day for 5.0 years (2.5 ttl pk-yrs)     Types: Cigarettes     Start date: 1965     Quit date: 1970     Years since quittin.8     Passive exposure: Never    Smokeless tobacco: Never   Vaping Use    Vaping status: Never Used   Substance and Sexual Activity    Alcohol use: Not Currently     Comment: rare    Drug use: Not Currently     Comment: Denies    Sexual activity: Not Currently     Birth control/protection: Post-menopausal   Other Topics Concern    None   Social History Narrative    None     Social Determinants of Health     Financial Resource Strain: Not on file   Food Insecurity: Not on file   Transportation Needs: Not on file   Physical Activity: Not on file   Stress: Not on file   Social Connections: Not on file   Intimate Partner Violence: Not on file   Housing Stability: Not on file     Outpatient Medications Marked as Taking for the 10/24/24 encounter (Annual Exam) with Mary Ann Burk PA-C   Medication    acetaminophen (TYLENOL) 500 mg tablet    amLODIPine (NORVASC) 5 mg tablet    atenolol-chlorthalidone (TENORETIC) 50-25 mg per tablet    losartan (COZAAR) 100 MG tablet    omeprazole (PriLOSEC) 20 mg delayed release capsule    potassium chloride (K-DUR,KLOR-CON) 20 mEq tablet     simvastatin (ZOCOR) 20 mg tablet    zolpidem (AMBIEN) 10 mg tablet     Allergies   Allergen Reactions    Pollen Extract Itching     Sneezing     Sulfa Antibiotics Rash     Cheeks and ears turned red  Other reaction(s): Shakiness           Objective:  /52 (BP Location: Left arm, Patient Position: Sitting, Cuff Size: Standard)   Ht 5' (1.524 m)   Wt 66.7 kg (147 lb)   BMI 28.71 kg/m²        Chaperone present? Offered, declines.     Physical Exam  Constitutional:       Appearance: Normal appearance. She is well-developed.   Genitourinary:      Vulva and bladder normal.      No lesions in the vagina.      Right Labia: No rash, tenderness, lesions or skin changes.     Left Labia: No tenderness, lesions, skin changes or rash.     No labial fusion noted.      No inguinal adenopathy present in the right or left side.     No vaginal discharge, erythema, tenderness or bleeding.        Right Adnexa: not tender, not full and no mass present.     Left Adnexa: not tender, not full and no mass present.     No cervical motion tenderness, discharge or lesion.      Uterus is not enlarged, tender or irregular.      No uterine mass detected.     No urethral prolapse, tenderness or mass present.      Bladder is not tender.    Breasts:     Breasts are symmetrical.      Right: No swelling, bleeding, inverted nipple, mass, nipple discharge, skin change or tenderness.      Left: No swelling, bleeding, inverted nipple, mass, nipple discharge, skin change or tenderness.   HENT:      Head: Normocephalic and atraumatic.   Neck:      Thyroid: No thyromegaly.   Cardiovascular:      Rate and Rhythm: Normal rate and regular rhythm.      Heart sounds: Normal heart sounds. No murmur heard.     No friction rub. No gallop.   Pulmonary:      Effort: Pulmonary effort is normal. No respiratory distress.      Breath sounds: Normal breath sounds. No wheezing.   Abdominal:      General: There is no distension.      Palpations: Abdomen is soft. There  is no mass.      Tenderness: There is no abdominal tenderness. There is no guarding or rebound.      Hernia: No hernia is present.   Lymphadenopathy:      Cervical: No cervical adenopathy.      Upper Body:      Right upper body: No supraclavicular, axillary or pectoral adenopathy.      Left upper body: No supraclavicular, axillary or pectoral adenopathy.      Lower Body: No right inguinal adenopathy. No left inguinal adenopathy.   Neurological:      Mental Status: She is alert and oriented to person, place, and time.   Skin:     General: Skin is warm and dry.   Psychiatric:         Behavior: Behavior normal.         Mary Ann Burk PA-C  10/29/2024 10:40 AM

## 2024-10-29 PROBLEM — Z01.419 GYNECOLOGIC EXAM NORMAL: Status: ACTIVE | Noted: 2024-10-29

## 2024-10-29 NOTE — ASSESSMENT & PLAN NOTE
Pap guidelines reviewed. Pap no longer indicated in this low risk patient over the age of 65 with no history of abnormal pap smears.   Continue yearly mammograms.   Colon cancer screening up to date.   Recommend Calcium 1200mg in two divided doses.   Vitamin D3 2,000-4,000 IU daily   Weight bearing exercises 3-4x's a week for 30-40min.   Script for DEXA scan given.   Return to office in 2 years for annual exam.

## 2024-11-13 ENCOUNTER — OFFICE VISIT (OUTPATIENT)
Dept: URGENT CARE | Facility: CLINIC | Age: 79
End: 2024-11-13
Payer: COMMERCIAL

## 2024-11-13 VITALS
DIASTOLIC BLOOD PRESSURE: 80 MMHG | SYSTOLIC BLOOD PRESSURE: 130 MMHG | HEART RATE: 50 BPM | RESPIRATION RATE: 16 BRPM | TEMPERATURE: 96.9 F | OXYGEN SATURATION: 98 %

## 2024-11-13 DIAGNOSIS — Z23 ENCOUNTER FOR IMMUNIZATION: ICD-10-CM

## 2024-11-13 DIAGNOSIS — S61.051A DOG BITE OF RIGHT THUMB, INITIAL ENCOUNTER: Primary | ICD-10-CM
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
## 2024-11-13 PROCEDURE — 99213 OFFICE O/P EST LOW 20 MIN: CPT

## 2024-11-13 PROCEDURE — 90471 IMMUNIZATION ADMIN: CPT

## 2024-11-13 PROCEDURE — 90715 TDAP VACCINE 7 YRS/> IM: CPT

## 2024-11-13 RX ORDER — GINSENG 100 MG
1 CAPSULE ORAL ONCE
Status: COMPLETED | OUTPATIENT
Start: 2024-11-13 | End: 2024-11-13

## 2024-11-13 RX ORDER — GINSENG 100 MG
1 CAPSULE ORAL 2 TIMES DAILY
Qty: 14 G | Refills: 0 | Status: SHIPPED | OUTPATIENT
Start: 2024-11-13 | End: 2024-11-20

## 2024-11-13 RX ADMIN — Medication 1 SMALL APPLICATION: at 12:07

## 2024-11-13 NOTE — PATIENT INSTRUCTIONS
You received a Tdap vaccine at time of today's visit. You may experience some pain or discomfort at the site of injection.     Take Augmentin as prescribed.    Apply bacitracin / antibiotic ointment daily.  Keep wound clean, dry, and covered.  Change the outer dressing daily and wash the area with soap and water.  You can change the outer dressing more frequently if it becomes dirty or wet.    Return to [facility] or go to ER for worsening redness, red line streaking, warmth, swelling, pain, pus drainage, or if you develop fevers/chills.    Follow up with your PCP in 3-5 days.

## 2024-11-13 NOTE — PROGRESS NOTES
[facility]        NAME: Sakshi Prieto is a 79 y.o. female  : 1945    MRN: [id number]  DATE: 2024  TIME: 12:48 PM    Assessment and Plan   Dog bite of right thumb, initial encounter [S61.051A, W54.0XXA]  1. Dog bite of right thumb, initial encounter  Tdap Vaccine greater than or equal to 8yo    bacitracin topical ointment 1 small application    amoxicillin-clavulanate (AUGMENTIN) 875-125 mg per tablet    bacitracin topical ointment 500 units/g topical ointment      2. Encounter for immunization  Tdap Vaccine greater than or equal to 8yo        Wound cleansed with chlorhexidine soap sponge under tap.  Bacitracin and dressing applied by RN.      Patient Instructions     You received a Tdap vaccine at time of today's visit. You may experience some pain or discomfort at the site of injection.     Take Augmentin as prescribed.    Apply bacitracin / antibiotic ointment daily.  Keep wound clean, dry, and covered.  Change the outer dressing daily and wash the area with soap and water.  You can change the outer dressing more frequently if it becomes dirty or wet.    Return to [facility] or go to ER for worsening redness, red line streaking, warmth, swelling, pain, pus drainage, or if you develop fevers/chills.    Follow up with your PCP in 3-5 days.     If tests are performed, our office will contact you with results only if changes need to made to the care plan discussed with you at the visit. You can review your full results on [facility].      Chief Complaint     Chief Complaint   Patient presents with    Dog Bite     Pt reports her dog bit her right thumb today at 10am. Reports she was trying to  an item that the dog was trying to get. Dog vaccines UTD. Pt reports last tetanus over 10 years ago.          History of Present Illness       79-year-old female presents with request for tetanus vaccine after her dog accidentally bit her right thumb today around 10 AM. The  laceration is small and bleeding was minimal. Dog is UTD with vaccines.        Review of Systems   Review of Systems   Constitutional:  Negative for fever.   Respiratory:  Negative for shortness of breath.    Cardiovascular:  Negative for chest pain.   Gastrointestinal:  Negative for abdominal pain.   Musculoskeletal:  Negative for arthralgias and myalgias.   Skin:  Positive for wound.   Neurological:  Negative for headaches.         Current Medications       Current Outpatient Medications:     acetaminophen (TYLENOL) 500 mg tablet, Take 500 mg by mouth every 6 (six) hours as needed for mild pain, Disp: , Rfl:     amLODIPine (NORVASC) 5 mg tablet, Take 5 mg by mouth daily , Disp: , Rfl:     amoxicillin-clavulanate (AUGMENTIN) 875-125 mg per tablet, Take 1 tablet by mouth every 12 (twelve) hours for 7 days, Disp: 14 tablet, Rfl: 0    atenolol-chlorthalidone (TENORETIC) 50-25 mg per tablet, Take 1 tablet by mouth daily, Disp: , Rfl:     bacitracin topical ointment 500 units/g topical ointment, Apply 1 large application topically 2 (two) times a day for 7 days, Disp: 14 g, Rfl: 0    losartan (COZAAR) 100 MG tablet, Take 100 mg by mouth daily, Disp: , Rfl:     omeprazole (PriLOSEC) 20 mg delayed release capsule, Take 1 capsule (20 mg total) by mouth daily, Disp: 30 capsule, Rfl: 2    potassium chloride (K-DUR,KLOR-CON) 20 mEq tablet, Take 20 mEq by mouth daily Take with food, Disp: , Rfl:     simvastatin (ZOCOR) 20 mg tablet, Take 20 mg by mouth daily at bedtime , Disp: , Rfl:     zolpidem (AMBIEN) 10 mg tablet, as needed , Disp: , Rfl:   No current facility-administered medications for this visit.    Current Allergies     Allergies as of 11/13/2024 - Reviewed 11/13/2024   Allergen Reaction Noted    Pollen extract Itching 03/05/2021    Sulfa antibiotics Rash 03/18/2020            The following portions of the patient's history were reviewed and updated as appropriate: allergies, current medications, past family history,  "past medical history, past social history, past surgical history and problem list.     Past Medical History:   Diagnosis Date    Allergic rhinitis     Arthritis     Basal cell carcinoma (BCC)     Pt states with BCC - had it removed by derm.    Colostomy in place (HCC)     DDD (degenerative disc disease), lumbar     Diverticulitis large intestine w/o perforation or abscess w/o bleeding     GERD (gastroesophageal reflux disease)     Hemorrhoids     Hernia of abdominal cavity 03/18/2020    History of gallstones     Hyperlipidemia     Hypertension     Lyme disease 1988    Papanicolaou smear 2018    Pneumonia     Hx of pneumonia x1    Skin cancer     face, squamous       Past Surgical History:   Procedure Laterality Date    BAND HEMORRHOIDECTOMY  07/08/2020    CHOLECYSTECTOMY      COLON SURGERY      COLONOSCOPY      COLOSTOMY Left 02/18/2021    Procedure: COLOSTOMY END creation;  Surgeon: Jacquelin Rose MD;  Location: [facility];  Service: General    DXA PROCEDURE (HISTORICAL)      EGD      FINGER SURGERY      RIGHT SECOND FINGER-\"TRIGGER FINGER\"    HERNIA REPAIR      LAPAROTOMY N/A 02/18/2021    Procedure: LAPAROTOMY EXPLORATORY;  Surgeon: Jacquelin Rose MD;  Location: [facility];  Service: General    MAMMO (HISTORICAL)      PILONIDAL CYST EXCISION      WY CLOSURE ENTEROSTOMY LG/SMALL INTESTINE N/A 05/12/2021    Procedure: COLOSTOMY TAKEDOWN / REVERSE GOLDEN, laparocopic anastomosis of colon, Extensive lysis of adhesions, intraop colonoscopy, Bilateral Tap block, Segmental rectum rection;  Surgeon: Al Ochoa MD;  Location: [facility];  Service: General    WY COLECTOMY PARTIAL W/ANASTOMOSIS N/A 02/18/2021    Procedure: RESECTION COLON SIGMOID;  Surgeon: Jacquelin Rose MD;  Location: [facility];  Service: General    WY LAPAROSCOPY SURG RPR INITIAL INGUINAL HERNIA Right 07/14/2020    Procedure: REPAIR HERNIA INGUINAL and femoral hernia, LAPAROSCOPIC;  Surgeon: Al Ochoa MD;  Location: [facility] " OR;  Service: General    DE LAPS ABD PRTM&OMENTUM DX W/WO SPEC BR/WA SPX N/A 02/18/2021    Procedure: LAPAROSCOPY DIAGNOSTIC;  Surgeon: Jacquelin Rose MD;  Location:  MAIN OR;  Service: General    SHOULDER SURGERY Right     VULVA BIOPSY         Family History   Problem Relation Age of Onset    Coronary artery disease Mother     Hypertension Mother     Osteoarthritis Mother     Hypotension Father     Emphysema Father     Prostate cancer Father          Medications have been verified.        Objective   /80   Pulse (!) 50   Temp (!) 96.9 °F (36.1 °C)   Resp 16   SpO2 98%        Physical Exam     Physical Exam  Vitals and nursing note reviewed.   Constitutional:       General: She is not in acute distress.  HENT:      Head: Normocephalic and atraumatic.      Mouth/Throat:      Mouth: Mucous membranes are moist.   Cardiovascular:      Rate and Rhythm: Normal rate.   Pulmonary:      Effort: Pulmonary effort is normal.   Musculoskeletal:         General: Normal range of motion.      Right hand: Tenderness present. No swelling. Normal range of motion. Normal strength. Normal sensation. Normal capillary refill. Normal pulse.        Hands:       Cervical back: Normal range of motion and neck supple.   Skin:     General: Skin is warm and dry.      Findings: Wound present.   Neurological:      Mental Status: She is alert and oriented to person, place, and time.

## 2024-11-19 ENCOUNTER — RESULTS FOLLOW-UP (OUTPATIENT)
Dept: OBGYN CLINIC | Facility: CLINIC | Age: 79
End: 2024-11-19

## 2024-11-26 ENCOUNTER — OFFICE VISIT (OUTPATIENT)
Dept: GASTROENTEROLOGY | Facility: CLINIC | Age: 79
End: 2024-11-26
Payer: COMMERCIAL

## 2024-11-26 VITALS
WEIGHT: 145 LBS | BODY MASS INDEX: 28.47 KG/M2 | SYSTOLIC BLOOD PRESSURE: 130 MMHG | DIASTOLIC BLOOD PRESSURE: 68 MMHG | HEIGHT: 60 IN

## 2024-11-26 DIAGNOSIS — K31.A0 GASTRIC INTESTINAL METAPLASIA: ICD-10-CM

## 2024-11-26 DIAGNOSIS — R13.14 PHARYNGOESOPHAGEAL DYSPHAGIA: ICD-10-CM

## 2024-11-26 DIAGNOSIS — Z90.49 H/O COLECTOMY: ICD-10-CM

## 2024-11-26 DIAGNOSIS — K22.2 SCHATZKI'S RING: Primary | ICD-10-CM

## 2024-11-26 PROBLEM — K57.32 DIVERTICULITIS LARGE INTESTINE: Status: RESOLVED | Noted: 2021-02-17 | Resolved: 2024-11-26

## 2024-11-26 PROCEDURE — G2211 COMPLEX E/M VISIT ADD ON: HCPCS | Performed by: INTERNAL MEDICINE

## 2024-11-26 PROCEDURE — 99214 OFFICE O/P EST MOD 30 MIN: CPT | Performed by: INTERNAL MEDICINE

## 2024-11-26 NOTE — PROGRESS NOTES
[facility] - Outpatient Follow-up Note  Sakshi Prieto 79 y.o. female MRN: [id number]  Encounter: [id number]    ASSESSMENT AND PLAN:      1. Schatzki's ring (Primary)  -- Patient with a history of Schatzki's ring.  Upper endoscopy performed September 2024.  Patient had esophageal dilation.  Good results since that time.  Able to eat without as much fear.  Still careful with chewing things up.  -Probably related to GERD.  Continue omeprazole 40 mg daily indefinitely    2. Gastric intestinal metaplasia  -Noted on isolated biopsy.  Patient probably should have follow-up EGD within a year.  Will schedule after next visit    3. Pharyngoesophageal dysphagia  -Will careful with hotdogs and rice.    4.  History of sigmoid colectomy.-Patient with a history of complicated diverticular disease requiring temporary colostomy then reanastomosis  -Some loose stool on occasion.  -Up-to-date with colonoscopies.  Last exam 2021.  Can take Imodium as needed      Followup Appointment: 5 mo   ______________________________________________________________________    Chief Complaint   Patient presents with    Follow-up     Patient is feeling better     HPI: Patient returns to the office for follow-up after her recent upper endoscopy performed for esophageal dysphagia.  She really has no significant issues with heartburn.  Upper endoscopy was performed in September.  Note was made of an irregular Z-line and a nonobstructing Schatzki's ring and a small hiatal hernia.  Esophageal dilation was performed with large bore dilators 48 and 52 French with good results.  He had gastritis as well H. pylori negative.  1 area of biopsies in the antrum did reveal focal area of intestinal metaplasia.  No eosinophilic esophagitis was noted on esophageal biopsies.  Patient has been on omeprazole.  She reports that her swallowing is not perfect but much improved.  She still has the be a little bit careful if she eats  "rice or something like a hot dog.  She has not really had any symptoms of esophageal obstruction since that time.  Patient is up-to-date with respect to colon cancer screening.  Last colonoscopy was done with [facility] 2021.  Patient has had a history of complicated diverticulitis requiring emergency surgery and colostomy.  She subsequently had preanastomosis of the area.  Since her surgery has had some problems with urgency and loose bowel movements.  This is sometimes controlled with Imodium.  He does take some supplemental fiber.  She does not take Imodium on a steady basis.    Historical Information   Past Medical History:   Diagnosis Date    Allergic rhinitis     Arthritis     Basal cell carcinoma (BCC)     Pt states with BCC - had it removed by derm.    Colostomy in place (HCC)     DDD (degenerative disc disease), lumbar     Diverticulitis large intestine w/o perforation or abscess w/o bleeding     GERD (gastroesophageal reflux disease)     Hemorrhoids     Hernia of abdominal cavity 03/18/2020    History of gallstones     Hyperlipidemia     Hypertension     Lyme disease 1988    Papanicolaou smear 2018    Pneumonia     Hx of pneumonia x1    Skin cancer     face, squamous     Past Surgical History:   Procedure Laterality Date    BAND HEMORRHOIDECTOMY  07/08/2020    CHOLECYSTECTOMY      COLON SURGERY      COLONOSCOPY      COLOSTOMY Left 02/18/2021    Procedure: COLOSTOMY END creation;  Surgeon: Jacquelin Rose MD;  Location: [facility];  Service: General    DXA PROCEDURE (HISTORICAL)      EGD      FINGER SURGERY      RIGHT SECOND FINGER-\"TRIGGER FINGER\"    HERNIA REPAIR      LAPAROTOMY N/A 02/18/2021    Procedure: LAPAROTOMY EXPLORATORY;  Surgeon: Jacquelin Rose MD;  Location: [facility];  Service: General    MAMMO (HISTORICAL)      PILONIDAL CYST EXCISION      [place] CLOSURE ENTEROSTOMY LG/SMALL INTESTINE N/A 05/12/2021    Procedure: COLOSTOMY TAKEDOWN / REVERSE GOLDEN, laparocopic anastomosis of " colon, Extensive lysis of adhesions, intraop colonoscopy, Bilateral Tap block, Segmental rectum rection;  Surgeon: Al Ochoa MD;  Location: [facility];  Service: General    MT COLECTOMY PARTIAL W/ANASTOMOSIS N/A 2021    Procedure: RESECTION COLON SIGMOID;  Surgeon: Jacquelin Rose MD;  Location: [facility];  Service: General    MT LAPAROSCOPY SURG RPR INITIAL INGUINAL HERNIA Right 2020    Procedure: REPAIR HERNIA INGUINAL and femoral hernia, LAPAROSCOPIC;  Surgeon: Al Ochoa MD;  Location: [facility];  Service: General    MT LAPS ABD PRTM&OMENTUM DX W/WO SPEC BR/WA SPX N/A 2021    Procedure: LAPAROSCOPY DIAGNOSTIC;  Surgeon: Jacquelin Rose MD;  Location: [facility];  Service: General    SHOULDER SURGERY Right     VULVA BIOPSY       Social History     Substance and Sexual Activity   Alcohol Use Not Currently    Comment: rare     Social History     Substance and Sexual Activity   Drug Use Not Currently    Comment: Denies     Social History     Tobacco Use   Smoking Status Former    Current packs/day: 0.00    Average packs/day: 0.5 packs/day for 5.0 years (2.5 ttl pk-yrs)    Types: Cigarettes    Start date: 1965    Quit date: 1970    Years since quittin.9    Passive exposure: Never   Smokeless Tobacco Never     Family History   Problem Relation Age of Onset    Coronary artery disease Mother     Hypertension Mother     Osteoarthritis Mother     Hypotension Father     Emphysema Father     Prostate cancer Father          Current Outpatient Medications:     acetaminophen (TYLENOL) 500 mg tablet    amLODIPine (NORVASC) 5 mg tablet    atenolol-chlorthalidone (TENORETIC) 50-25 mg per tablet    losartan (COZAAR) 100 MG tablet    omeprazole (PriLOSEC) 20 mg delayed release capsule    potassium chloride (K-DUR,KLOR-CON) 20 mEq tablet    simvastatin (ZOCOR) 20 mg tablet    zolpidem (AMBIEN) 10 mg tablet    bacitracin topical ointment 500 units/g topical ointment  Allergies    Allergen Reactions    Pollen Extract Itching     Sneezing     Sulfa Antibiotics Rash     Cheeks and ears turned red  Other reaction(s): Yuki     Reviewed medications and allergies and updated as indicated    PHYSICAL EXAM:    Blood pressure 130/68, height 5' (1.524 m), weight 65.8 kg (145 lb), not currently breastfeeding. Body mass index is 28.32 kg/m².  General Appearance: NAD, cooperative, alert  Eyes: Anicteric, conjunctiva pink  ENT:  Normocephalic, atraumatic, normal mucosa.    Neck:  Supple, symmetrical, trachea midline  Resp:  Clear to auscultation bilaterally; no rales, rhonchi or wheezing; respirations unlabored   CV:  S1 S2, Regular rate and rhythm; no murmur, rub, or gallop.  GI:  Soft, non-tender, non-distended; normal bowel sounds; no masses, no organomegaly   Rectal: Deferred  Musculoskeletal: No cyanosis, clubbing or edema. Normal ROM.  Skin:  No jaundice, rashes, or lesions   Heme/Lymph: No palpable cervical lymphadenopathy  Psych: Normal affect, good eye contact  Neuro: No gross deficits, AAOx3    Lab Results:   Lab Results   Component Value Date    WBC 7.63 08/07/2024    HGB 12.8 08/07/2024    HCT 38.9 08/07/2024    MCV 90 08/07/2024     08/07/2024     Lab Results   Component Value Date    K 4.2 08/07/2024     08/07/2024    CO2 26 08/07/2024    BUN 24 08/07/2024    CREATININE 1.32 (H) 08/07/2024    GLUF 97 06/16/2020    CALCIUM 9.9 08/07/2024    CORRECTEDCA 8.6 05/13/2021    AST 17 08/07/2024    ALT 10 08/07/2024    ALKPHOS 69 08/07/2024    EGFR 39 08/07/2024

## 2024-11-26 NOTE — PATIENT INSTRUCTIONS
[facility] - Outpatient Follow-up Note  Sakshi Prieto 79 y.o. female MRN: [id number]  Encounter: [id number]    ASSESSMENT AND PLAN:      1. Schatzki's ring (Primary)  -- Patient with a history of Schatzki's ring.  Upper endoscopy performed September 2024.  Patient had esophageal dilation.  Good results since that time.  Able to eat without as much fear.  Still careful with chewing things up.  -Probably related to GERD.  Continue omeprazole 40 mg daily indefinitely    2. Gastric intestinal metaplasia  -Noted on isolated biopsy.  Patient probably should have follow-up EGD within a year.  Will schedule after next visit    3. Pharyngoesophageal dysphagia  -Will careful with hotdogs and rice.    [facility] Outpatient Follow-up Note  Sakshi Prieto 79 y.o. female MRN: [id number]  Encounter: [id number]    ASSESSMENT AND PLAN:      1. Schatzki's ring (Primary)  -- Patient with a history of Schatzki's ring.  Upper endoscopy performed September 2024.  Patient had esophageal dilation.  Good results since that time.  Able to eat without as much fear.  Still careful with chewing things up.  -Probably related to GERD.  Continue omeprazole 40 mg daily indefinitely    2. Gastric intestinal metaplasia  -Noted on isolated biopsy.  Patient probably should have follow-up EGD within a year.  Will schedule after next visit    3. Pharyngoesophageal dysphagia  -Will careful with hotdogs and rice.    4.  History of sigmoid colectomy.-    Patient with a history of complicated diverticular disease requiring temporary colostomy then reanastomosis  -Some loose stool on occasion.  -No harm in taking Imodium 1 tablet daily before breakfast  -Up-to-date with colonoscopies.  Last exam 2021.  Can take Imodium as needed      Followup Appointment: 5 mo   __________________________      Followup Appointment: 5 mo

## 2024-11-28 PROBLEM — Z01.419 GYNECOLOGIC EXAM NORMAL: Status: RESOLVED | Noted: 2024-10-29 | Resolved: 2024-11-28

## 2024-12-02 NOTE — H&P
H&P Exam - General Surgery   Adriana Perez 70 y o  female MRN: [id number]  Unit/Bed#: ED 04 Encounter: [id number]    Assessment/Plan     Assessment:  Pneumoperitoneum of unknown etiology  Plan: Will proceed to the operating room for diagnostic laparoscopy, possible laparotomy  Explained that the origin of the pneumoperitoneum is unknown but likely colon  Given location of pain  There is no significant inflammation around the colon or fluid  Explained that if perforation is unable to be identified made to perform exploratory laparotomy with possible bowel resection, possible ostomy  Risks of procedure were explained including bleeding, infection, prolonged hospital course  Will continue IV antibiotics  IV fluids  Pain control    History of Present Illness     HPI:  Adriana Perez is a 70 y o  female who presents with 1 day of severe abdominal pain  She reports having a large bowel movement after dinner last night which caused her to feel a little sore in her left lower quadrant but then she went to sleep and woke up this morning with worsened abdominal pain, mainly across the lower abdomen  No nausea or vomiting  She has a history of diverticulosis, laparoscopic inguinal hernia repair in July of 2020  She underwent CT scan in the emergency department that shows numerous foci of free air without specific source  There is no significant inflammation or fluid in the abdomen  Review of Systems   Gastrointestinal: Positive for abdominal pain  Negative for constipation, diarrhea, nausea and vomiting  All other systems reviewed and are negative        Historical Information   Past Medical History:   Diagnosis Date    Allergic rhinitis     DDD (degenerative disc disease), lumbar     Diverticulitis large intestine w/o perforation or abscess w/o bleeding     Hemorrhoids     Hernia of abdominal cavity 03/18/2020    History of gallstones     Hyperlipidemia     Hypertension     Lyme disease 1988     Past High Dose Vitamin A Pregnancy And Lactation Text: High dose vitamin A therapy is contraindicated during pregnancy and breast feeding. Minocycline Pregnancy And Lactation Text: This medication is Pregnancy Category D and not consider safe during pregnancy. It is also excreted in breast milk. Surgical History:   Procedure Laterality Date    BAND HEMORRHOIDECTOMY  2020    CHOLECYSTECTOMY      COLONOSCOPY      FINGER SURGERY      RIGHT SECOND FINGER-"TRIGGER FINGER"    PILONIDAL CYST EXCISION      AK LAP,INGUINAL HERNIA REPR,INITIAL Right 2020    Procedure: REPAIR HERNIA INGUINAL and femoral hernia, LAPAROSCOPIC;  Surgeon: Maye Santa MD;  Location:  MAIN OR;  Service: General    SHOULDER SURGERY Right      Social History   Social History     Substance and Sexual Activity   Alcohol Use Not Currently     Social History     Substance and Sexual Activity   Drug Use Not Currently     Social History     Tobacco Use   Smoking Status Former Smoker    Packs/day: 0 50    Years: 5 00    Pack years: 2 50    Quit date: 5    Years since quittin 1   Smokeless Tobacco Never Used     E-Cigarette/Vaping    E-Cigarette Use Never User      E-Cigarette/Vaping Substances    Nicotine No     Flavoring No      Family History: non-contributory    Meds/Allergies   all medications and allergies reviewed  Allergies   Allergen Reactions    Sulfa Antibiotics Rash     Cheeks and ears turned red       Objective   First Vitals:   Blood Pressure: (!) 180/78 (21 1417)  Pulse: 83 (21 141)  Temperature: 98 6 °F (37 °C) (21 141)  Temp Source: Tympanic (21 141)  Respirations: 18 (21 141)  Weight - Scale: 68 kg (150 lb) (21 141)  SpO2: 98 % (21 141)    Current Vitals:   Blood Pressure: (!) 180/78 (21 1417)  Pulse: 83 (21 1417)  Temperature: 98 6 °F (37 °C) (21 141)  Temp Source: Tympanic (21 1417)  Respirations: 18 (21 1417)  Weight - Scale: 68 kg (150 lb) (21 1417)  SpO2: 98 % (21 1417)      Intake/Output Summary (Last 24 hours) at 2021 1823  Last data filed at 2021 1801  Gross per 24 hour   Intake 600 ml   Output --   Net 600 ml       Invasive Devices     Peripheral Intravenous Line Aklief counseling:  Patient advised to apply a pea-sized amount only at bedtime and wait 30 minutes after washing their face before applying.  If too drying, patient may add a non-comedogenic moisturizer.  The most commonly reported side effects including irritation, redness, scaling, dryness, stinging, burning, itching, and increased risk of sunburn.  The patient verbalized understanding of the proper use and possible adverse effects of retinoids.  All of the patient's questions and concerns were addressed. Peripheral IV 02/18/21 Antecubital less than 1 day    Peripheral IV 02/18/21 Right Antecubital less than 1 day                Physical Exam  Vitals signs reviewed  Constitutional:       General: She is not in acute distress  Appearance: Normal appearance  She is normal weight  She is not ill-appearing or toxic-appearing  HENT:      Head: Normocephalic and atraumatic  Eyes:      Extraocular Movements: Extraocular movements intact  Conjunctiva/sclera: Conjunctivae normal       Pupils: Pupils are equal, round, and reactive to light  Neck:      Musculoskeletal: Normal range of motion and neck supple  Cardiovascular:      Rate and Rhythm: Normal rate and regular rhythm  Pulmonary:      Effort: Pulmonary effort is normal  No respiratory distress  Breath sounds: Normal breath sounds  Abdominal:      General: Abdomen is flat  There is no distension  Palpations: Abdomen is soft  Tenderness: There is abdominal tenderness  There is guarding and rebound  Musculoskeletal: Normal range of motion  General: No swelling or tenderness  Skin:     General: Skin is warm and dry  Neurological:      General: No focal deficit present  Mental Status: She is alert and oriented to person, place, and time  Psychiatric:         Mood and Affect: Mood normal          Behavior: Behavior normal          Thought Content: Thought content normal          Judgment: Judgment normal          Lab Results:   I have personally reviewed pertinent lab results    , CBC:   Lab Results   Component Value Date    WBC 16 78 (H) 02/18/2021    HGB 13 5 02/18/2021    HCT 39 8 02/18/2021    MCV 90 02/18/2021     02/18/2021    MCH 30 4 02/18/2021    MCHC 33 9 02/18/2021    RDW 12 5 02/18/2021    MPV 9 9 02/18/2021    NRBC 0 02/18/2021   , CMP:   Lab Results   Component Value Date    SODIUM 136 02/18/2021    K 3 0 (L) 02/18/2021    CL 99 (L) 02/18/2021    CO2 29 02/18/2021    BUN 20 02/18/2021    CREATININE 0 90 02/18/2021    CALCIUM 9 1 02/18/2021    AST 21 02/18/2021    ALT 25 02/18/2021    ALKPHOS 77 02/18/2021    EGFR 64 02/18/2021     Imaging: I have personally reviewed pertinent reports  and I have personally reviewed pertinent films in PACS  EKG, Pathology, and Other Studies: I have personally reviewed pertinent reports  Doxycycline Counseling:  Patient counseled regarding possible photosensitivity and increased risk for sunburn.  Patient instructed to avoid sunlight, if possible.  When exposed to sunlight, patients should wear protective clothing, sunglasses, and sunscreen.  The patient was instructed to call the office immediately if the following severe adverse effects occur:  hearing changes, easy bruising/bleeding, severe headache, or vision changes.  The patient verbalized understanding of the proper use and possible adverse effects of doxycycline.  All of the patient's questions and concerns were addressed. Tazorac Pregnancy And Lactation Text: This medication is not safe during pregnancy. It is unknown if this medication is excreted in breast milk. Tetracycline Counseling: Patient counseled regarding possible photosensitivity and increased risk for sunburn.  Patient instructed to avoid sunlight, if possible.  When exposed to sunlight, patients should wear protective clothing, sunglasses, and sunscreen.  The patient was instructed to call the office immediately if the following severe adverse effects occur:  hearing changes, easy bruising/bleeding, severe headache, or vision changes.  The patient verbalized understanding of the proper use and possible adverse effects of tetracycline.  All of the patient's questions and concerns were addressed. Patient understands to avoid pregnancy while on therapy due to potential birth defects. Dapsone Counseling: I discussed with the patient the risks of dapsone including but not limited to hemolytic anemia, agranulocytosis, rashes, methemoglobinemia, kidney failure, peripheral neuropathy, headaches, GI upset, and liver toxicity.  Patients who start dapsone require monitoring including baseline LFTs and weekly CBCs for the first month, then every month thereafter.  The patient verbalized understanding of the proper use and possible adverse effects of dapsone.  All of the patient's questions and concerns were addressed. Spironolactone Counseling: Patient advised regarding risks of diarrhea, abdominal pain, hyperkalemia, birth defects (for female patients), liver toxicity and renal toxicity. The patient may need blood work to monitor liver and kidney function and potassium levels while on therapy. The patient verbalized understanding of the proper use and possible adverse effects of spironolactone.  All of the patient's questions and concerns were addressed. Topical Retinoid Pregnancy And Lactation Text: This medication is Pregnancy Category C. It is unknown if this medication is excreted in breast milk. Winlevi Counseling:  I discussed with the patient the risks of topical clascoterone including but not limited to erythema, scaling, itching, and stinging. Patient voiced their understanding. Isotretinoin Pregnancy And Lactation Text: This medication is Pregnancy Category X and is considered extremely dangerous during pregnancy. It is unknown if it is excreted in breast milk. Birth Control Pills Counseling: Birth Control Pill Counseling: I discussed with the patient the potential side effects of OCPs including but not limited to increased risk of stroke, heart attack, thrombophlebitis, deep venous thrombosis, hepatic adenomas, breast changes, GI upset, headaches, and depression.  The patient verbalized understanding of the proper use and possible adverse effects of OCPs. All of the patient's questions and concerns were addressed. Erythromycin Pregnancy And Lactation Text: This medication is Pregnancy Category B and is considered safe during pregnancy. It is also excreted in breast milk. Benzoyl Peroxide Pregnancy And Lactation Text: This medication is Pregnancy Category C. It is unknown if benzoyl peroxide is excreted in breast milk. Topical Sulfur Applications Counseling: Topical Sulfur Counseling: Patient counseled that this medication may cause skin irritation or allergic reactions.  In the event of skin irritation, the patient was advised to reduce the amount of the drug applied or use it less frequently.   The patient verbalized understanding of the proper use and possible adverse effects of topical sulfur application.  All of the patient's questions and concerns were addressed. Bactrim Counseling:  I discussed with the patient the risks of sulfa antibiotics including but not limited to GI upset, allergic reaction, drug rash, diarrhea, dizziness, photosensitivity, and yeast infections.  Rarely, more serious reactions can occur including but not limited to aplastic anemia, agranulocytosis, methemoglobinemia, blood dyscrasias, liver or kidney failure, lung infiltrates or desquamative/blistering drug rashes. Topical Clindamycin Counseling: Patient counseled that this medication may cause skin irritation or allergic reactions.  In the event of skin irritation, the patient was advised to reduce the amount of the drug applied or use it less frequently.   The patient verbalized understanding of the proper use and possible adverse effects of clindamycin.  All of the patient's questions and concerns were addressed. Minocycline Counseling: Patient advised regarding possible photosensitivity and discoloration of the teeth, skin, lips, tongue and gums.  Patient instructed to avoid sunlight, if possible.  When exposed to sunlight, patients should wear protective clothing, sunglasses, and sunscreen.  The patient was instructed to call the office immediately if the following severe adverse effects occur:  hearing changes, easy bruising/bleeding, severe headache, or vision changes.  The patient verbalized understanding of the proper use and possible adverse effects of minocycline.  All of the patient's questions and concerns were addressed. Doxycycline Pregnancy And Lactation Text: This medication is Pregnancy Category D and not consider safe during pregnancy. It is also excreted in breast milk but is considered safe for shorter treatment courses. Azelaic Acid Pregnancy And Lactation Text: This medication is considered safe during pregnancy and breast feeding. Sarecycline Counseling: Patient advised regarding possible photosensitivity and discoloration of the teeth, skin, lips, tongue and gums.  Patient instructed to avoid sunlight, if possible.  When exposed to sunlight, patients should wear protective clothing, sunglasses, and sunscreen.  The patient was instructed to call the office immediately if the following severe adverse effects occur:  hearing changes, easy bruising/bleeding, severe headache, or vision changes.  The patient verbalized understanding of the proper use and possible adverse effects of sarecycline.  All of the patient's questions and concerns were addressed. Use Enhanced Medication Counseling?: No Winlevi Pregnancy And Lactation Text: This medication is considered safe during pregnancy and breastfeeding. Tazorac Counseling:  Patient advised that medication is irritating and drying.  Patient may need to apply sparingly and wash off after an hour before eventually leaving it on overnight.  The patient verbalized understanding of the proper use and possible adverse effects of tazorac.  All of the patient's questions and concerns were addressed. Azithromycin Counseling:  I discussed with the patient the risks of azithromycin including but not limited to GI upset, allergic reaction, drug rash, diarrhea, and yeast infections. Dapsone Pregnancy And Lactation Text: This medication is Pregnancy Category C and is not considered safe during pregnancy or breast feeding. Aklief Pregnancy And Lactation Text: It is unknown if this medication is safe to use during pregnancy.  It is unknown if this medication is excreted in breast milk.  Breastfeeding women should use the topical cream on the smallest area of the skin for the shortest time needed while breastfeeding.  Do not apply to nipple and areola. Topical Sulfur Applications Pregnancy And Lactation Text: This medication is Pregnancy Category C and has an unknown safety profile during pregnancy. It is unknown if this topical medication is excreted in breast milk. Topical Retinoid counseling:  Patient advised to apply a pea-sized amount only at bedtime and wait 30 minutes after washing their face before applying.  If too drying, patient may add a non-comedogenic moisturizer. The patient verbalized understanding of the proper use and possible adverse effects of retinoids.  All of the patient's questions and concerns were addressed. Birth Control Pills Pregnancy And Lactation Text: This medication should be avoided if pregnant and for the first 30 days post-partum. Isotretinoin Counseling: Patient should get monthly blood tests, not donate blood, not drive at night if vision affected, not share medication, and not undergo elective surgery for 6 months after tx completed. Side effects reviewed, pt to contact office should one occur. High Dose Vitamin A Counseling: Side effects reviewed, pt to contact office should one occur. Spironolactone Pregnancy And Lactation Text: This medication can cause feminization of the male fetus and should be avoided during pregnancy. The active metabolite is also found in breast milk. Detail Level: Detailed Benzoyl Peroxide Counseling: Patient counseled that medicine may cause skin irritation and bleach clothing.  In the event of skin irritation, the patient was advised to reduce the amount of the drug applied or use it less frequently.   The patient verbalized understanding of the proper use and possible adverse effects of benzoyl peroxide.  All of the patient's questions and concerns were addressed. Bactrim Pregnancy And Lactation Text: This medication is Pregnancy Category D and is known to cause fetal risk.  It is also excreted in breast milk. Erythromycin Counseling:  I discussed with the patient the risks of erythromycin including but not limited to GI upset, allergic reaction, drug rash, diarrhea, increase in liver enzymes, and yeast infections. Azelaic Acid Counseling: Patient counseled that medicine may cause skin irritation and to avoid applying near the eyes.  In the event of skin irritation, the patient was advised to reduce the amount of the drug applied or use it less frequently.   The patient verbalized understanding of the proper use and possible adverse effects of azelaic acid.  All of the patient's questions and concerns were addressed. Topical Clindamycin Pregnancy And Lactation Text: This medication is Pregnancy Category B and is considered safe during pregnancy. It is unknown if it is excreted in breast milk. Azithromycin Pregnancy And Lactation Text: This medication is considered safe during pregnancy and is also secreted in breast milk.

## 2024-12-15 DIAGNOSIS — K21.9 GASTROESOPHAGEAL REFLUX DISEASE WITHOUT ESOPHAGITIS: ICD-10-CM

## 2025-04-28 ENCOUNTER — OFFICE VISIT (OUTPATIENT)
Dept: GASTROENTEROLOGY | Facility: CLINIC | Age: 80
End: 2025-04-28
Payer: COMMERCIAL

## 2025-04-28 ENCOUNTER — TELEPHONE (OUTPATIENT)
Dept: GASTROENTEROLOGY | Facility: CLINIC | Age: 80
End: 2025-04-28

## 2025-04-28 VITALS
BODY MASS INDEX: 28.07 KG/M2 | DIASTOLIC BLOOD PRESSURE: 68 MMHG | HEIGHT: 60 IN | SYSTOLIC BLOOD PRESSURE: 116 MMHG | WEIGHT: 143 LBS

## 2025-04-28 DIAGNOSIS — R10.11 ABDOMINAL DISCOMFORT IN RIGHT UPPER QUADRANT: ICD-10-CM

## 2025-04-28 DIAGNOSIS — Z12.11 SCREENING FOR COLON CANCER: ICD-10-CM

## 2025-04-28 DIAGNOSIS — K21.9 GASTROESOPHAGEAL REFLUX DISEASE WITHOUT ESOPHAGITIS: Primary | ICD-10-CM

## 2025-04-28 DIAGNOSIS — K31.A0 GASTRIC INTESTINAL METAPLASIA: ICD-10-CM

## 2025-04-28 DIAGNOSIS — K22.2 SCHATZKI'S RING: ICD-10-CM

## 2025-04-28 PROCEDURE — 99214 OFFICE O/P EST MOD 30 MIN: CPT | Performed by: INTERNAL MEDICINE

## 2025-04-28 RX ORDER — LOPERAMIDE HYDROCHLORIDE 2 MG/1
2 TABLET ORAL 4 TIMES DAILY PRN
COMMUNITY

## 2025-04-28 RX ORDER — SODIUM CHLORIDE, SODIUM LACTATE, POTASSIUM CHLORIDE, CALCIUM CHLORIDE 600; 310; 30; 20 MG/100ML; MG/100ML; MG/100ML; MG/100ML
125 INJECTION, SOLUTION INTRAVENOUS CONTINUOUS
OUTPATIENT
Start: 2025-04-28

## 2025-04-28 NOTE — ASSESSMENT & PLAN NOTE
Patient with EGD performed for her dysphagia back in September.  Did have esophageal dilation with 48 and 82 Faroese Morin dilator with good results.  Still has some mild residual dysphagia but much improved.  No symptoms of esophageal obstruction.  -Might consider redilation at next EGD

## 2025-04-28 NOTE — ASSESSMENT & PLAN NOTE
Patient with a history of GERD.  She has been on omeprazole 20 mg daily with good results.  Should continue the same.  Probably as a cause for the Schatzki's ring

## 2025-04-28 NOTE — PROGRESS NOTES
Name: Sakshi Prieto      : 1945      MRN: [id number]  Encounter Provider: Jer Chapman MD  Encounter Date: 2025   Encounter department: [facility]  :  Assessment & Plan  Gastroesophageal reflux disease without esophagitis  Patient with a history of GERD.  She has been on omeprazole 20 mg daily with good results.  Should continue the same.  Probably as a cause for the Schatzki's ring       Schatzki's ring  Patient with EGD performed for her dysphagia back in September.  Did have esophageal dilation with 48 and 82 [place]ian Morin dilator with good results.  Still has some mild residual dysphagia but much improved.  No symptoms of esophageal obstruction.  -Might consider redilation at next EGD       Gastric intestinal metaplasia  Patient with mild gastritis.  She had focal intestinal metaplasia on biopsies.  Did not have mapping.  Will schedule EGD and multiple biopsies.  Probably low low risk as patient is  and had just focal intestinal metaplasia  Orders:    EGD; Future    Abdominal discomfort in right upper quadrant  Patient reports several month history of abnormal sensation right upper quadrant.  Seems to be more noticeable after activity.  Not related to eating.  Does have a fairly large surgical scar in this area with no clear hernia  Patient with borderline elevated creatinine at 1.3 on previous exam.  Need to repeat prior to exam to see if we could safely give contrast  Orders:    Comprehensive metabolic panel; Future    CT abdomen pelvis w contrast; Future    Screening for colon cancer  Last examination  by Dr. Mullins.  No history of polyps.       Pending EGD     History of Present Illness   HPI  Sakshi Prieto is a 79 y.o. female who returns to the office after her evaluation last fall for symptoms of solid food dysphagia.  Patient was having difficulty several times a week and actually had some symptoms of esophageal obstruction  "after eating cheese on 1 occasion.  She had particular problems with rice.  Upper endoscopy was performed which showed a Schatzki's ring.  She was dilated with 48 and 52 Serbian Morin dilators with good results.  She was also placed on a PPI.  She had frequent heartburn at that time but now on just 20 of omeprazole daily she is doing well without heartburn.  She will have occasional episodes of dysphagia but has had much improvement.  Upper endoscopy at that time also revealed some mild gastritis and biopsies were taken in the antrum.  Note was made of \"focal intestinal metaplasia.  Lastly patient does report some not pain but some vague discomfort in her right upper quadrant.  Sometimes it is under her ribs and sometimes slightly below.  Seems to be worse with increased activity.  Symptoms are not worse with eating.  The patient did have exploratory surgery and a large scar in that area.  Surgeries have included temporary colostomy for severe diverticular disease.  Patient did have subsequent Johanna anastomosis by Dr. Vela subsequently.  She required emergency surgery for her diverticular disease in 2020.  Patient's last colonoscopy was by Dr. Mullins in 2020 [facility] prior to her reanastomosis and no polyps were noted at that time.  Patient gives no previous history of colon polyp      Review of Systems       Objective   /68   Ht 5' (1.524 m)   Wt 64.9 kg (143 lb)   BMI 27.93 kg/m²      Physical Exam.General Appearance: NAD, cooperative, alert-appears younger than recorded age  Eyes: Anicteric, conjunctiva pink  ENT:  Normocephalic, atraumatic, normal mucosa.    Neck:    Supple, symmetrical, trachea midline  Resp:  Clear to auscultation bilaterally; no rales, rhonchi or wheezing; respirations unlabored   CV:  S1 S2, Regular rate and rhythm; no murmur, rub, or gallop.  GI:  Soft, non-tender, non-distended; normal bowel sounds; no masses, no organomegaly -no clear hernia.  Irregular scar right upper quadrant " small scar corresponding to previous ostomy left upper quadrant  Rectal: Deferred  Musculoskeletal: No cyanosis, clubbing or edema. Normal ROM.  Skin:     No jaundice, rashes, or lesions   Heme/Lymph: No palpable cervical lymphadenopathy  Psych: Normal affect, good eye contact  Neuro: No gross deficits, AAOx3

## 2025-04-28 NOTE — H&P (VIEW-ONLY)
Name: Sakshi Prieto      : 1945      MRN: [id number]  Encounter Provider: Jer Chapman MD  Encounter Date: 2025   Encounter department: [facility]  :  Assessment & Plan  Gastroesophageal reflux disease without esophagitis  Patient with a history of GERD.  She has been on omeprazole 20 mg daily with good results.  Should continue the same.  Probably as a cause for the Schatzki's ring       Schatzki's ring  Patient with EGD performed for her dysphagia back in September.  Did have esophageal dilation with 48 and 82 Swazi Morin dilator with good results.  Still has some mild residual dysphagia but much improved.  No symptoms of esophageal obstruction.  -Might consider redilation at next EGD       Gastric intestinal metaplasia  Patient with mild gastritis.  She had focal intestinal metaplasia on biopsies.  Did not have mapping.  Will schedule EGD and multiple biopsies.  Probably low low risk as patient is  and had just focal intestinal metaplasia  Orders:    EGD; Future    Abdominal discomfort in right upper quadrant  Patient reports several month history of abnormal sensation right upper quadrant.  Seems to be more noticeable after activity.  Not related to eating.  Does have a fairly large surgical scar in this area with no clear hernia  Patient with borderline elevated creatinine at 1.3 on previous exam.  Need to repeat prior to exam to see if we could safely give contrast  Orders:    Comprehensive metabolic panel; Future    CT abdomen pelvis w contrast; Future    Screening for colon cancer  Last examination  by Dr. Mullins.  No history of polyps.       Pending EGD     History of Present Illness   HPI  Sakshi Prieto is a 79 y.o. female who returns to the office after her evaluation last fall for symptoms of solid food dysphagia.  Patient was having difficulty several times a week and actually had some symptoms of esophageal obstruction  "after eating cheese on 1 occasion.  She had particular problems with rice.  Upper endoscopy was performed which showed a Schatzki's ring.  She was dilated with 48 and 52 Lao Morin dilators with good results.  She was also placed on a PPI.  She had frequent heartburn at that time but now on just 20 of omeprazole daily she is doing well without heartburn.  She will have occasional episodes of dysphagia but has had much improvement.  Upper endoscopy at that time also revealed some mild gastritis and biopsies were taken in the antrum.  Note was made of \"focal intestinal metaplasia.  Lastly patient does report some not pain but some vague discomfort in her right upper quadrant.  Sometimes it is under her ribs and sometimes slightly below.  Seems to be worse with increased activity.  Symptoms are not worse with eating.  The patient did have exploratory surgery and a large scar in that area.  Surgeries have included temporary colostomy for severe diverticular disease.  Patient did have subsequent Johanna anastomosis by Dr. Vela subsequently.  She required emergency surgery for her diverticular disease in 2020.  Patient's last colonoscopy was by Dr. Mullins in 2020 [facility] prior to her reanastomosis and no polyps were noted at that time.  Patient gives no previous history of colon polyp      Review of Systems       Objective   /68   Ht 5' (1.524 m)   Wt 64.9 kg (143 lb)   BMI 27.93 kg/m²      Physical Exam.General Appearance: NAD, cooperative, alert-appears younger than recorded age  Eyes: Anicteric, conjunctiva pink  ENT:  Normocephalic, atraumatic, normal mucosa.    Neck:    Supple, symmetrical, trachea midline  Resp:  Clear to auscultation bilaterally; no rales, rhonchi or wheezing; respirations unlabored   CV:  S1 S2, Regular rate and rhythm; no murmur, rub, or gallop.  GI:  Soft, non-tender, non-distended; normal bowel sounds; no masses, no organomegaly -no clear hernia.  Irregular scar right upper quadrant " small scar corresponding to previous ostomy left upper quadrant  Rectal: Deferred  Musculoskeletal: No cyanosis, clubbing or edema. Normal ROM.  Skin:     No jaundice, rashes, or lesions   Heme/Lymph: No palpable cervical lymphadenopathy  Psych: Normal affect, good eye contact  Neuro: No gross deficits, AAOx3

## 2025-04-28 NOTE — TELEPHONE ENCOUNTER
Scheduled date of EGD(as of today):5/20/25  Physician performing EGD:GIOVANNI  Location of EGD:[facility]  Instructions reviewed with patient by:JONATHAN  Clearances: N

## 2025-04-28 NOTE — ASSESSMENT & PLAN NOTE
Patient with mild gastritis.  She had focal intestinal metaplasia on biopsies.  Did not have mapping.  Will schedule EGD and multiple biopsies.  Probably low low risk as patient is  and had just focal intestinal metaplasia  Orders:  •  EGD; Future

## 2025-04-28 NOTE — PATIENT INSTRUCTIONS
me: Sakshi Prieto      : 1945      MRN: [id number]  Encounter Provider: Jer Chapman MD  Encounter Date: 2025   Encounter department: [facility]  :  Assessment & Plan  Gastroesophageal reflux disease without esophagitis  Patient with a history of GERD.  She has been on omeprazole 20 mg daily with good results.  Should continue the same.  Probably as a cause for the Schatzki's ring       Schatzki's ring  Patient with EGD performed for her dysphagia back in September.  Did have esophageal dilation with 48 and 82 Croatian Morin dilator with good results.  Still has some mild residual dysphagia but much improved.  No symptoms of esophageal obstruction.  -Might consider redilation at next EGD       Gastric intestinal metaplasia  Patient with mild gastritis.  She had focal intestinal metaplasia on biopsies.  Did not have mapping.  Will schedule EGD and multiple biopsies.  Probably low low risk as patient is  and had just focal intestinal metaplasia  Orders:    EGD; Future    Abdominal discomfort in right upper quadrant  Patient reports several month history of abnormal sensation right upper quadrant.  Seems to be more noticeable after activity.  Not related to eating.  Does have a fairly large surgical scar in this area with no clear hernia  Patient with borderline elevated creatinine at 1.3 on previous exam.  Need to repeat prior to exam to see if we could safely give contrast  Orders:    Comprehensive metabolic panel; Future    CT abdomen pelvis w contrast; Future    Screening for colon cancer  Last examination  by Dr. Mullins.  No history of polyps.       Pending EGD

## 2025-05-02 ENCOUNTER — TELEPHONE (OUTPATIENT)
Dept: GASTROENTEROLOGY | Facility: CLINIC | Age: 80
End: 2025-05-02

## 2025-05-06 ENCOUNTER — TELEPHONE (OUTPATIENT)
Dept: GASTROENTEROLOGY | Facility: CLINIC | Age: 80
End: 2025-05-06

## 2025-05-06 ENCOUNTER — ANESTHESIA (OUTPATIENT)
Dept: ANESTHESIOLOGY | Facility: AMBULATORY SURGERY CENTER | Age: 80
End: 2025-05-06

## 2025-05-06 ENCOUNTER — ANESTHESIA EVENT (OUTPATIENT)
Dept: ANESTHESIOLOGY | Facility: AMBULATORY SURGERY CENTER | Age: 80
End: 2025-05-06

## 2025-05-06 DIAGNOSIS — R10.11 RUQ PAIN: Primary | ICD-10-CM

## 2025-05-06 NOTE — TELEPHONE ENCOUNTER
"I had ordered CAT scan for the patient with right upper quadrant pain.  Did not have an ultrasound.  Not felt to be \"medically necessary\" by reviewer.  Will obtain abdominal ultrasound first  Staff to send paperwork  "

## 2025-05-07 LAB
ALBUMIN SERPL-MCNC: 4.8 G/DL (ref 3.8–4.8)
ALP SERPL-CCNC: 89 IU/L (ref 44–121)
ALT SERPL-CCNC: 9 IU/L (ref 0–32)
AST SERPL-CCNC: 20 IU/L (ref 0–40)
BILIRUB SERPL-MCNC: 1 MG/DL (ref 0–1.2)
BUN SERPL-MCNC: 22 MG/DL (ref 8–27)
BUN/CREAT SERPL: 22 (ref 12–28)
CALCIUM SERPL-MCNC: 9.9 MG/DL (ref 8.7–10.3)
CHLORIDE SERPL-SCNC: 96 MMOL/L (ref 96–106)
CO2 SERPL-SCNC: 20 MMOL/L (ref 20–29)
CREAT SERPL-MCNC: 0.99 MG/DL (ref 0.57–1)
EGFR: 59 ML/MIN/1.73
GLOBULIN SER-MCNC: 2.7 G/DL (ref 1.5–4.5)
GLUCOSE SERPL-MCNC: 108 MG/DL (ref 70–99)
POTASSIUM SERPL-SCNC: 4.1 MMOL/L (ref 3.5–5.2)
PROT SERPL-MCNC: 7.5 G/DL (ref 6–8.5)
SODIUM SERPL-SCNC: 134 MMOL/L (ref 134–144)

## 2025-05-08 ENCOUNTER — RESULTS FOLLOW-UP (OUTPATIENT)
Dept: GASTROENTEROLOGY | Facility: CLINIC | Age: 80
End: 2025-05-08

## 2025-05-11 ENCOUNTER — HOSPITAL ENCOUNTER (OUTPATIENT)
Dept: ULTRASOUND IMAGING | Facility: HOSPITAL | Age: 80
Discharge: HOME | End: 2025-05-11
Attending: INTERNAL MEDICINE
Payer: COMMERCIAL

## 2025-05-11 DIAGNOSIS — R10.11 RUQ PAIN: ICD-10-CM

## 2025-05-11 PROCEDURE — 76700 US EXAM ABDOM COMPLETE: CPT

## 2025-05-16 ENCOUNTER — RESULTS FOLLOW-UP (OUTPATIENT)
Dept: GASTROENTEROLOGY | Facility: CLINIC | Age: 80
End: 2025-05-16

## 2025-05-16 NOTE — RESULT ENCOUNTER NOTE
Message sent in [employer]-patient with negative abdominal ultrasonography  If symptoms persist considering CT scan-seeing patient next week will discuss at that time

## 2025-05-20 ENCOUNTER — ANESTHESIA EVENT (OUTPATIENT)
Dept: GASTROENTEROLOGY | Facility: AMBULATORY SURGERY CENTER | Age: 80
End: 2025-05-20

## 2025-05-20 ENCOUNTER — ANESTHESIA (OUTPATIENT)
Dept: GASTROENTEROLOGY | Facility: AMBULATORY SURGERY CENTER | Age: 80
End: 2025-05-20

## 2025-05-20 ENCOUNTER — HOSPITAL ENCOUNTER (OUTPATIENT)
Dept: GASTROENTEROLOGY | Facility: AMBULATORY SURGERY CENTER | Age: 80
Discharge: HOME | End: 2025-05-20
Attending: INTERNAL MEDICINE
Payer: COMMERCIAL

## 2025-05-20 VITALS
SYSTOLIC BLOOD PRESSURE: 163 MMHG | HEIGHT: 60 IN | OXYGEN SATURATION: 98 % | DIASTOLIC BLOOD PRESSURE: 67 MMHG | BODY MASS INDEX: 28.07 KG/M2 | HEART RATE: 49 BPM | RESPIRATION RATE: 20 BRPM | WEIGHT: 143 LBS | TEMPERATURE: 97.3 F

## 2025-05-20 DIAGNOSIS — K31.A0 GASTRIC INTESTINAL METAPLASIA: ICD-10-CM

## 2025-05-20 PROCEDURE — 43450 DILATE ESOPHAGUS 1/MULT PASS: CPT | Performed by: INTERNAL MEDICINE

## 2025-05-20 PROCEDURE — 88313 SPECIAL STAINS GROUP 2: CPT | Performed by: STUDENT IN AN ORGANIZED HEALTH CARE EDUCATION/TRAINING PROGRAM

## 2025-05-20 PROCEDURE — 88305 TISSUE EXAM BY PATHOLOGIST: CPT | Performed by: STUDENT IN AN ORGANIZED HEALTH CARE EDUCATION/TRAINING PROGRAM

## 2025-05-20 PROCEDURE — 43239 EGD BIOPSY SINGLE/MULTIPLE: CPT | Performed by: INTERNAL MEDICINE

## 2025-05-20 RX ORDER — PROPOFOL 10 MG/ML
INJECTION, EMULSION INTRAVENOUS AS NEEDED
Status: DISCONTINUED | OUTPATIENT
Start: 2025-05-20 | End: 2025-05-21

## 2025-05-20 RX ORDER — SODIUM CHLORIDE, SODIUM LACTATE, POTASSIUM CHLORIDE, CALCIUM CHLORIDE 600; 310; 30; 20 MG/100ML; MG/100ML; MG/100ML; MG/100ML
125 INJECTION, SOLUTION INTRAVENOUS CONTINUOUS
Status: DISCONTINUED | OUTPATIENT
Start: 2025-05-20 | End: 2025-05-24 | Stop reason: HOSPADM

## 2025-05-20 RX ORDER — SODIUM CHLORIDE, SODIUM LACTATE, POTASSIUM CHLORIDE, CALCIUM CHLORIDE 600; 310; 30; 20 MG/100ML; MG/100ML; MG/100ML; MG/100ML
INJECTION, SOLUTION INTRAVENOUS CONTINUOUS PRN
Status: DISCONTINUED | OUTPATIENT
Start: 2025-05-20 | End: 2025-05-21

## 2025-05-20 RX ADMIN — PROPOFOL 30 MG: 10 INJECTION, EMULSION INTRAVENOUS at 15:05

## 2025-05-20 RX ADMIN — PROPOFOL 50 MG: 10 INJECTION, EMULSION INTRAVENOUS at 15:01

## 2025-05-20 RX ADMIN — PROPOFOL 50 MG: 10 INJECTION, EMULSION INTRAVENOUS at 15:09

## 2025-05-20 RX ADMIN — PROPOFOL 150 MG: 10 INJECTION, EMULSION INTRAVENOUS at 14:57

## 2025-05-20 RX ADMIN — SODIUM CHLORIDE, SODIUM LACTATE, POTASSIUM CHLORIDE, CALCIUM CHLORIDE: 600; 310; 30; 20 INJECTION, SOLUTION INTRAVENOUS at 14:53

## 2025-05-20 RX ADMIN — SODIUM CHLORIDE, SODIUM LACTATE, POTASSIUM CHLORIDE, CALCIUM CHLORIDE 125 ML/HR: 600; 310; 30; 20 INJECTION, SOLUTION INTRAVENOUS at 13:56

## 2025-05-20 NOTE — ANESTHESIA PREPROCEDURE EVALUATION
Procedure:  EGD    Relevant Problems   CARDIO   (+) Hyperlipidemia   (+) Hypertension      GI/HEPATIC   (+) Gastroesophageal reflux disease      Colostomy reversal  Physical Exam    Airway     Mallampati score: II  TM Distance: >3 FB  Neck ROM: full      Cardiovascular      Dental   Comment: None loose, No notable dental hx     Pulmonary      Neurological      Other Findings  post-pubertal.      Anesthesia Plan  ASA Score- 2     Anesthesia Type- IV sedation with anesthesia with ASA Monitors.         Additional Monitors:     Airway Plan:     Comment: 04:30 - sip of water with meds. Bowel prep finished before then    Patient educated on the possibility for awareness under sedation and of the possibility of airway intervention in the event of an airway or procedural emergency  .       Plan Factors-Exercise tolerance (METS): >4 METS.    Chart reviewed.    Patient summary reviewed.    Patient is not a current smoker.              Induction- intravenous.    Postoperative Plan-         Informed Consent- Anesthetic plan and risks discussed with patient.  I personally reviewed this patient with the CRNA. Discussed and agreed on the Anesthesia Plan with the CRNA..

## 2025-05-20 NOTE — ANESTHESIA POSTPROCEDURE EVALUATION
Post-Op Assessment Note    CV Status:  Stable  Pain Score: 0    Pain management: adequate    Multimodal analgesia used between 6 hours prior to anesthesia start to PACU discharge    Mental Status:  Alert and awake   Hydration Status:  Euvolemic and stable   PONV Controlled:  Controlled   Airway Patency:  Patent  Two or more mitigation strategies used for obstructive sleep apnea   Post Op Vitals Reviewed: Yes    No anethesia notable event occurred.    Staff: CRNA           Last Filed PACU Vitals:  Vitals Value Taken Time   Temp 97    Pulse 78    /61    Resp 12    SpO2 98

## 2025-05-20 NOTE — INTERVAL H&P NOTE
H&P reviewed. After examining the patient I find no changes in the patients condition since the H&P had been written.    Vitals:    05/20/25 1344   BP: (!) 192/77   Pulse: (!) 50   Resp: 20   Temp: (!) 97.3 °F (36.3 °C)   SpO2: 99%

## 2025-05-22 PROCEDURE — 88305 TISSUE EXAM BY PATHOLOGIST: CPT | Performed by: STUDENT IN AN ORGANIZED HEALTH CARE EDUCATION/TRAINING PROGRAM

## 2025-05-22 PROCEDURE — 88313 SPECIAL STAINS GROUP 2: CPT | Performed by: STUDENT IN AN ORGANIZED HEALTH CARE EDUCATION/TRAINING PROGRAM

## 2025-05-26 ENCOUNTER — RESULTS FOLLOW-UP (OUTPATIENT)
Dept: GASTROENTEROLOGY | Facility: CLINIC | Age: 80
End: 2025-05-26

## 2025-05-27 NOTE — RESULT ENCOUNTER NOTE
Message sent on [facility] no gastric intestinal metaplasia.  No H. pylori.  Fundic gland polyps.  No concerns.  No recall EGD.

## 2025-06-29 ENCOUNTER — TELEPHONE (OUTPATIENT)
Dept: GASTROENTEROLOGY | Facility: CLINIC | Age: 80
End: 2025-06-29

## 2025-06-30 NOTE — TELEPHONE ENCOUNTER
I called the patient and left a voice message.  I had left a message on [employer] but patient did not view.  No intestinal metaplasia or H. pylori.  No recall EGD

## 2025-07-16 ENCOUNTER — TELEPHONE (OUTPATIENT)
Dept: GASTROENTEROLOGY | Facility: CLINIC | Age: 80
End: 2025-07-16

## 2025-07-16 DIAGNOSIS — K21.9 GASTROESOPHAGEAL REFLUX DISEASE WITHOUT ESOPHAGITIS: ICD-10-CM

## 2025-07-17 RX ORDER — OMEPRAZOLE 20 MG/1
20 CAPSULE, DELAYED RELEASE ORAL DAILY
Qty: 30 CAPSULE | Refills: 5 | Status: SHIPPED | OUTPATIENT
Start: 2025-07-17

## (undated) DEVICE — TROCAR: Brand: KII® SLEEVE

## (undated) DEVICE — GAUZE SPONGES,USP TYPE VII GAUZE, 12 PLY: Brand: CURITY

## (undated) DEVICE — ELECTRODE BLADE MOD E-Z CLEAN 2.5IN 6.4CM -0012M

## (undated) DEVICE — 3000CC GUARDIAN II: Brand: GUARDIAN

## (undated) DEVICE — BLUE HEAT SCOPE WARMER

## (undated) DEVICE — INTENDED FOR TISSUE SEPARATION, AND OTHER PROCEDURES THAT REQUIRE A SHARP SURGICAL BLADE TO PUNCTURE OR CUT.: Brand: BARD-PARKER SAFETY BLADES SIZE 10, STERILE

## (undated) DEVICE — ELECTRODE BLADE MOD  E-Z CLEAN 6.5IN -0014M

## (undated) DEVICE — TOWEL SET X-RAY

## (undated) DEVICE — CIRCULAR MECH XL SEAL 25MM

## (undated) DEVICE — SUT CHROMIC 3-0 SH 27 IN G122H

## (undated) DEVICE — PAD GROUNDING ADULT

## (undated) DEVICE — TRAY FOLEY 16FR URIMETER SURESTEP

## (undated) DEVICE — DRAIN JACKSON PRATT 15FR: Brand: CARDINAL HEALTH

## (undated) DEVICE — SUT VICRYL 3-0 SH 27 IN J416H

## (undated) DEVICE — GLOVE INDICATOR PI UNDERGLOVE SZ 8 BLUE

## (undated) DEVICE — SUT PDS II 3-0 SH 27 IN Z316H

## (undated) DEVICE — TO REMOVE ADHESIVE TAPE FROM SKIN.: Brand: PDI® ADHESIVE TAPE REMOVER PAD

## (undated) DEVICE — SCD SEQUENTIAL COMPRESSION COMFORT SLEEVE MEDIUM KNEE LENGTH: Brand: KENDALL SCD

## (undated) DEVICE — GLOVE SRG BIOGEL ECLIPSE 8

## (undated) DEVICE — BETHLEHEM UNIVERSAL GYN LAP PK: Brand: CARDINAL HEALTH

## (undated) DEVICE — SPONGE LAP 18 X 18 IN STRL RFD

## (undated) DEVICE — SUT VICRYL 0 UR-6 27 IN J603H

## (undated) DEVICE — TROCAR: Brand: KII FIOS FIRST ENTRY

## (undated) DEVICE — ADHESIVE SKIN HIGH VISCOSITY EXOFIN 1ML

## (undated) DEVICE — SUT STRATAFIX SPIRAL 1-0 PDO 36 X 36 CM SXPD2B405

## (undated) DEVICE — CONTOUR CURVED CUTTER STAPLER RELOAD: Brand: CONTOUR

## (undated) DEVICE — PACK PBDS LAP CHOLE RF

## (undated) DEVICE — 3M™ TEGADERM™ TRANSPARENT FILM DRESSING FRAME STYLE, 1627, 4 IN X 10 IN (10 CM X 25 CM), 20/CT 4CT/CASE: Brand: 3M™ TEGADERM™

## (undated) DEVICE — CHLORAPREP HI-LITE 10.5ML ORANGE

## (undated) DEVICE — GLOVE SRG BIOGEL 6.5

## (undated) DEVICE — SUT VICRYL 0 REEL 54 IN J287G

## (undated) DEVICE — GLOVE INDICATOR PI UNDERGLOVE SZ 6.5 BLUE

## (undated) DEVICE — ENDOPATH 5MM ENDOSCOPIC BLUNT TIP DISSECTORS (12 POUCHES CONTAINING 3 DISSECTORS EACH): Brand: ENDOPATH

## (undated) DEVICE — SUT MONOCRYL 4-0 PS-2 27 IN Y426H

## (undated) DEVICE — SUT PDS II 1 CT-1 27 IN Z347H

## (undated) DEVICE — NEEDLE 25G X 1 1/2

## (undated) DEVICE — ABSORBABLE WOUND CLOSURE DEVICE: Brand: V-LOC 90

## (undated) DEVICE — INTENDED FOR TISSUE SEPARATION, AND OTHER PROCEDURES THAT REQUIRE A SHARP SURGICAL BLADE TO PUNCTURE OR CUT.: Brand: BARD-PARKER SAFETY BLADES SIZE 11, STERILE

## (undated) DEVICE — CHLORAPREP HI-LITE 26ML ORANGE

## (undated) DEVICE — GLOVE SRG BIOGEL ECLIPSE 6.5

## (undated) DEVICE — ACCESS PLATFORM FOR MINIMALLY INVASIVE SURGERY.: Brand: GELPORT® LAPAROSCOPIC  SYSTEM

## (undated) DEVICE — HARMONIC ACE 5MM DIAMETER SHEARS 36CM SHAFT LENGTH + ADAPTIVE TISSUE TECHNOLOGY FOR USE WITH GENERATOR G11: Brand: HARMONIC ACE

## (undated) DEVICE — THE SIMPULSE SOLO SYSTEM WITH ULTREX RETRACTABLE SPLASH SHIELD TIP: Brand: SIMPULSE SOLO

## (undated) DEVICE — SYRINGE 10ML LL

## (undated) DEVICE — IRRIG ENDO FLO TUBING

## (undated) DEVICE — ECHELON FLEX POWERED PLUS ARTICULATING ENDOSCOPIC LINEAR CUTTER , 60MM: Brand: ECHELON FLEX

## (undated) DEVICE — DRAIN SPONGES,6 PLY: Brand: EXCILON

## (undated) DEVICE — JACKSON-PRATT 100CC BULB RESERVOIR: Brand: CARDINAL HEALTH

## (undated) DEVICE — TROCARS: Brand: KII® BALLOON BLUNT TIP SYSTEM

## (undated) DEVICE — POOLE SUCTION HANDLE: Brand: CARDINAL HEALTH

## (undated) DEVICE — SUT PROLENE 2-0 CT-2 30 IN 8411H

## (undated) DEVICE — ENDOPATH PNEUMONEEDLE INSUFFLATION NEEDLES WITH LUER LOCK CONNECTORS 120MM: Brand: ENDOPATH

## (undated) DEVICE — DRAPE TOWEL: Brand: CONVERTORS

## (undated) DEVICE — PENCIL ELECTROSURG E-Z CLEAN -0035H

## (undated) DEVICE — SUT ETHILON 3-0 PS-1 18 IN 1663H

## (undated) DEVICE — TUBING SUCTION 5MM X 12 FT

## (undated) DEVICE — SUT SILK 2-0 30 IN A305H

## (undated) DEVICE — CONTOUR CURVED CUTTER STAPLER: Brand: CONTOUR

## (undated) DEVICE — MEDI-VAC YANKAUER SUCTION HANDLE W/BULBOUS AND CONTROL VENT: Brand: CARDINAL HEALTH

## (undated) DEVICE — ALLENTOWN LAP CHOLE APP PACK: Brand: CARDINAL HEALTH

## (undated) DEVICE — PLUMEPEN PRO 10FT

## (undated) DEVICE — GLOVE SRG BIOGEL 8

## (undated) DEVICE — OCCLUSIVE GAUZE STRIP,3% BISMUTH TRIBROMOPHENATE IN PETROLATUM BLEND: Brand: XEROFORM

## (undated) DEVICE — SYRINGE CATH TIP 50ML

## (undated) DEVICE — PROXIMATE SKIN STAPLERS (35 WIDE) CONTAINS 35 STAINLESS STEEL STAPLES (FIXED HEAD): Brand: PROXIMATE

## (undated) DEVICE — INTENDED FOR TISSUE SEPARATION, AND OTHER PROCEDURES THAT REQUIRE A SHARP SURGICAL BLADE TO PUNCTURE OR CUT.: Brand: BARD-PARKER SAFETY BLADES SIZE 15, STERILE

## (undated) DEVICE — ENDOPATH ECHELON ENDOSCOPIC LINEAR CUTTER RELOADS, BLUE, 60MM: Brand: ECHELON ENDOPATH

## (undated) DEVICE — DRAPE EQUIPMENT RF WAND

## (undated) DEVICE — ENDOPATH 5MM CURVED SCISSORS WITH MONOPOLAR CAUTERY: Brand: ENDOPATH

## (undated) DEVICE — DRESSING MEPILEX AG BORDER 4 X 12 IN

## (undated) DEVICE — OSTO KIT COLOS 2-3/4 FL

## (undated) DEVICE — WOUND RETRACTOR AND PROTECTOR: Brand: ALEXIS O WOUND PROTECTOR-RETRACTOR

## (undated) DEVICE — DRAPE LAPAROTOMY W/POUCHES